# Patient Record
Sex: FEMALE | Race: WHITE | NOT HISPANIC OR LATINO | Employment: UNEMPLOYED | URBAN - NONMETROPOLITAN AREA
[De-identification: names, ages, dates, MRNs, and addresses within clinical notes are randomized per-mention and may not be internally consistent; named-entity substitution may affect disease eponyms.]

---

## 2017-02-13 ENCOUNTER — TRANSCRIBE ORDERS (OUTPATIENT)
Dept: ADMINISTRATIVE | Facility: HOSPITAL | Age: 62
End: 2017-02-13

## 2017-02-13 DIAGNOSIS — Z12.31 VISIT FOR SCREENING MAMMOGRAM: Primary | ICD-10-CM

## 2017-02-13 DIAGNOSIS — M81.0 OSTEOPOROSIS, UNSPECIFIED: ICD-10-CM

## 2020-07-24 ENCOUNTER — APPOINTMENT (OUTPATIENT)
Dept: CT IMAGING | Facility: HOSPITAL | Age: 65
End: 2020-07-24

## 2020-07-24 ENCOUNTER — HOSPITAL ENCOUNTER (EMERGENCY)
Facility: HOSPITAL | Age: 65
Discharge: HOME | End: 2020-07-24
Attending: EMERGENCY MEDICINE | Admitting: EMERGENCY MEDICINE

## 2020-07-24 VITALS
DIASTOLIC BLOOD PRESSURE: 71 MMHG | SYSTOLIC BLOOD PRESSURE: 127 MMHG | OXYGEN SATURATION: 99 % | RESPIRATION RATE: 16 BRPM | BODY MASS INDEX: 30.22 KG/M2 | WEIGHT: 177 LBS | HEIGHT: 64 IN | TEMPERATURE: 98.9 F | HEART RATE: 85 BPM

## 2020-07-24 DIAGNOSIS — R07.89 NON-CARDIAC CHEST PAIN: Primary | ICD-10-CM

## 2020-07-24 LAB
ALBUMIN SERPL-MCNC: 4.52 G/DL (ref 3.5–5.2)
ALBUMIN/GLOB SERPL: 1.3 G/DL
ALP SERPL-CCNC: 100 U/L (ref 39–117)
ALT SERPL W P-5'-P-CCNC: 22 U/L (ref 1–33)
ANION GAP SERPL CALCULATED.3IONS-SCNC: 12.3 MMOL/L (ref 5–15)
AST SERPL-CCNC: 14 U/L (ref 1–32)
BASOPHILS # BLD AUTO: 0.04 10*3/MM3 (ref 0–0.2)
BASOPHILS NFR BLD AUTO: 0.4 % (ref 0–1.5)
BILIRUB SERPL-MCNC: 0.5 MG/DL (ref 0–1.2)
BUN SERPL-MCNC: 8 MG/DL (ref 8–23)
BUN/CREAT SERPL: 10.3 (ref 7–25)
CALCIUM SPEC-SCNC: 9.9 MG/DL (ref 8.6–10.5)
CHLORIDE SERPL-SCNC: 101 MMOL/L (ref 98–107)
CO2 SERPL-SCNC: 27.7 MMOL/L (ref 22–29)
CREAT SERPL-MCNC: 0.78 MG/DL (ref 0.57–1)
DEPRECATED RDW RBC AUTO: 42.5 FL (ref 37–54)
EOSINOPHIL # BLD AUTO: 0.1 10*3/MM3 (ref 0–0.4)
EOSINOPHIL NFR BLD AUTO: 0.9 % (ref 0.3–6.2)
ERYTHROCYTE [DISTWIDTH] IN BLOOD BY AUTOMATED COUNT: 13.1 % (ref 12.3–15.4)
GFR SERPL CREATININE-BSD FRML MDRD: 75 ML/MIN/1.73
GLOBULIN UR ELPH-MCNC: 3.4 GM/DL
GLUCOSE SERPL-MCNC: 120 MG/DL (ref 65–99)
HCT VFR BLD AUTO: 46 % (ref 34–46.6)
HGB BLD-MCNC: 15.1 G/DL (ref 12–15.9)
HOLD SPECIMEN: NORMAL
HOLD SPECIMEN: NORMAL
IMM GRANULOCYTES # BLD AUTO: 0.03 10*3/MM3 (ref 0–0.05)
IMM GRANULOCYTES NFR BLD AUTO: 0.3 % (ref 0–0.5)
LYMPHOCYTES # BLD AUTO: 2.51 10*3/MM3 (ref 0.7–3.1)
LYMPHOCYTES NFR BLD AUTO: 22.3 % (ref 19.6–45.3)
MCH RBC QN AUTO: 29.8 PG (ref 26.6–33)
MCHC RBC AUTO-ENTMCNC: 32.8 G/DL (ref 31.5–35.7)
MCV RBC AUTO: 90.7 FL (ref 79–97)
MONOCYTES # BLD AUTO: 0.81 10*3/MM3 (ref 0.1–0.9)
MONOCYTES NFR BLD AUTO: 7.2 % (ref 5–12)
NEUTROPHILS NFR BLD AUTO: 68.9 % (ref 42.7–76)
NEUTROPHILS NFR BLD AUTO: 7.76 10*3/MM3 (ref 1.7–7)
NRBC BLD AUTO-RTO: 0 /100 WBC (ref 0–0.2)
PLATELET # BLD AUTO: 311 10*3/MM3 (ref 140–450)
PMV BLD AUTO: 10 FL (ref 6–12)
POTASSIUM SERPL-SCNC: 3.7 MMOL/L (ref 3.5–5.2)
PROT SERPL-MCNC: 7.9 G/DL (ref 6–8.5)
RBC # BLD AUTO: 5.07 10*6/MM3 (ref 3.77–5.28)
SODIUM SERPL-SCNC: 141 MMOL/L (ref 136–145)
TROPONIN T SERPL-MCNC: <0.01 NG/ML (ref 0–0.03)
WBC # BLD AUTO: 11.25 10*3/MM3 (ref 3.4–10.8)
WHOLE BLOOD HOLD SPECIMEN: NORMAL
WHOLE BLOOD HOLD SPECIMEN: NORMAL

## 2020-07-24 PROCEDURE — 25010000002 GLUCAGON (HUMAN RECOMBINANT) 1 MG RECONSTITUTED SOLUTION: Performed by: EMERGENCY MEDICINE

## 2020-07-24 PROCEDURE — 96374 THER/PROPH/DIAG INJ IV PUSH: CPT

## 2020-07-24 PROCEDURE — 84484 ASSAY OF TROPONIN QUANT: CPT | Performed by: PHYSICIAN ASSISTANT

## 2020-07-24 PROCEDURE — 71250 CT THORAX DX C-: CPT | Performed by: RADIOLOGY

## 2020-07-24 PROCEDURE — 93005 ELECTROCARDIOGRAM TRACING: CPT | Performed by: FAMILY MEDICINE

## 2020-07-24 PROCEDURE — 71250 CT THORAX DX C-: CPT

## 2020-07-24 PROCEDURE — 85025 COMPLETE CBC W/AUTO DIFF WBC: CPT | Performed by: PHYSICIAN ASSISTANT

## 2020-07-24 PROCEDURE — 93010 ELECTROCARDIOGRAM REPORT: CPT | Performed by: INTERNAL MEDICINE

## 2020-07-24 PROCEDURE — 93005 ELECTROCARDIOGRAM TRACING: CPT | Performed by: EMERGENCY MEDICINE

## 2020-07-24 PROCEDURE — 99284 EMERGENCY DEPT VISIT MOD MDM: CPT

## 2020-07-24 PROCEDURE — 80053 COMPREHEN METABOLIC PANEL: CPT | Performed by: PHYSICIAN ASSISTANT

## 2020-07-24 RX ORDER — SODIUM CHLORIDE 0.9 % (FLUSH) 0.9 %
10 SYRINGE (ML) INJECTION AS NEEDED
Status: DISCONTINUED | OUTPATIENT
Start: 2020-07-24 | End: 2020-07-24 | Stop reason: HOSPADM

## 2020-07-24 RX ORDER — ASPIRIN 81 MG/1
324 TABLET, CHEWABLE ORAL ONCE
Status: COMPLETED | OUTPATIENT
Start: 2020-07-24 | End: 2020-07-24

## 2020-07-24 RX ORDER — WATER 1000 ML/1000ML
INJECTION, SOLUTION INTRAVENOUS
Status: COMPLETED
Start: 2020-07-24 | End: 2020-07-24

## 2020-07-24 RX ORDER — MAGNESIUM HYDROXIDE/ALUMINUM HYDROXICE/SIMETHICONE 120; 1200; 1200 MG/30ML; MG/30ML; MG/30ML
10 SUSPENSION ORAL ONCE
Status: DISCONTINUED | OUTPATIENT
Start: 2020-07-24 | End: 2020-07-24 | Stop reason: HOSPADM

## 2020-07-24 RX ORDER — MORPHINE SULFATE 2 MG/ML
2 INJECTION, SOLUTION INTRAMUSCULAR; INTRAVENOUS ONCE
Status: DISCONTINUED | OUTPATIENT
Start: 2020-07-24 | End: 2020-07-24 | Stop reason: HOSPADM

## 2020-07-24 RX ADMIN — GLUCAGON HYDROCHLORIDE 1 MG: 1 INJECTION, POWDER, FOR SOLUTION INTRAMUSCULAR; INTRAVENOUS; SUBCUTANEOUS at 17:21

## 2020-07-24 RX ADMIN — ASPIRIN 324 MG: 81 TABLET, CHEWABLE ORAL at 16:27

## 2020-07-24 RX ADMIN — WATER 10 ML: 1 INJECTION INTRAMUSCULAR; INTRAVENOUS; SUBCUTANEOUS at 17:21

## 2020-08-01 NOTE — ED PROVIDER NOTES
Subjective   History of Present Illness    Review of Systems    Past Medical History:   Diagnosis Date   • Arm fracture    • Hypertension        No Known Allergies    Past Surgical History:   Procedure Laterality Date   • CARPAL TUNNEL RELEASE Bilateral    • CHOLECYSTECTOMY     • HYSTERECTOMY         Family History   Problem Relation Age of Onset   • Hypertension Mother    • Heart disease Father    • Hypertension Father    • Hypertension Sister    • Heart disease Brother    • Hypertension Brother    • Diabetes Brother        Social History     Socioeconomic History   • Marital status:      Spouse name: Not on file   • Number of children: Not on file   • Years of education: Not on file   • Highest education level: Not on file   Tobacco Use   • Smoking status: Former Smoker     Types: Electronic Cigarette   Substance and Sexual Activity   • Alcohol use: No   • Drug use: No           Objective   Physical Exam    Procedures           ED Course                                           MDM    Final diagnoses:   Non-cardiac chest pain

## 2020-08-05 NOTE — ED PROVIDER NOTES
Subjective   Patient presents to ER with chest pain      Chest Pain   Pain location:  Unable to specify  Pain quality: sharp    Pain radiates to:  Does not radiate  Pain severity:  Mild  Timing:  Constant  Chronicity:  New  Context: breathing    Relieved by:  Nothing  Worsened by:  Nothing      Review of Systems   Constitutional: Negative.    HENT: Negative.    Eyes: Negative.    Respiratory: Negative.    Cardiovascular: Positive for chest pain.   Gastrointestinal: Negative.    Endocrine: Negative.    Genitourinary: Negative.    Musculoskeletal: Negative.    Skin: Negative.    Allergic/Immunologic: Negative.    Neurological: Negative.    Hematological: Negative.    All other systems reviewed and are negative.      Past Medical History:   Diagnosis Date   • Arm fracture    • Hypertension        No Known Allergies    Past Surgical History:   Procedure Laterality Date   • CARPAL TUNNEL RELEASE Bilateral    • CHOLECYSTECTOMY     • HYSTERECTOMY         Family History   Problem Relation Age of Onset   • Hypertension Mother    • Heart disease Father    • Hypertension Father    • Hypertension Sister    • Heart disease Brother    • Hypertension Brother    • Diabetes Brother        Social History     Socioeconomic History   • Marital status:      Spouse name: Not on file   • Number of children: Not on file   • Years of education: Not on file   • Highest education level: Not on file   Tobacco Use   • Smoking status: Former Smoker     Types: Electronic Cigarette   Substance and Sexual Activity   • Alcohol use: No   • Drug use: No           Objective   Physical Exam   Constitutional: She appears well-developed.   HENT:   Head: Normocephalic and atraumatic.   Eyes: Pupils are equal, round, and reactive to light.   Neck: Normal range of motion.   Cardiovascular: Regular rhythm and normal pulses.   Pulmonary/Chest: Effort normal. She has no decreased breath sounds.   Abdominal: Soft.   Musculoskeletal: Normal range of  motion.   Neurological: She is alert.   Skin: Skin is warm.   Nursing note and vitals reviewed.      Procedures           ED Course                                           MDM    Final diagnoses:   Non-cardiac chest pain            Manfred Moreno MD  08/05/20 1457       Manfred Moreno MD  08/05/20 1458       Manfred Moreno MD  08/24/20 1544       Manfred Moreno MD  08/24/20 4713

## 2020-09-02 ENCOUNTER — TRANSCRIBE ORDERS (OUTPATIENT)
Dept: ADMINISTRATIVE | Facility: HOSPITAL | Age: 65
End: 2020-09-02

## 2020-09-02 DIAGNOSIS — Z01.818 OTHER SPECIFIED PRE-OPERATIVE EXAMINATION: Primary | ICD-10-CM

## 2020-09-07 ENCOUNTER — LAB (OUTPATIENT)
Dept: LAB | Facility: HOSPITAL | Age: 65
End: 2020-09-07

## 2020-09-07 DIAGNOSIS — Z01.818 OTHER SPECIFIED PRE-OPERATIVE EXAMINATION: ICD-10-CM

## 2020-09-07 LAB — SARS-COV-2 RNA NOSE QL NAA+PROBE: NOT DETECTED

## 2020-09-07 PROCEDURE — U0004 COV-19 TEST NON-CDC HGH THRU: HCPCS

## 2020-09-07 PROCEDURE — C9803 HOPD COVID-19 SPEC COLLECT: HCPCS

## 2020-09-10 ENCOUNTER — TRANSCRIBE ORDERS (OUTPATIENT)
Dept: LAB | Facility: HOSPITAL | Age: 65
End: 2020-09-10

## 2020-09-10 DIAGNOSIS — Z01.818 PRE-OPERATIVE CLEARANCE: Primary | ICD-10-CM

## 2020-09-11 ENCOUNTER — LAB (OUTPATIENT)
Dept: LAB | Facility: HOSPITAL | Age: 65
End: 2020-09-11

## 2020-09-11 DIAGNOSIS — Z01.818 PRE-OPERATIVE CLEARANCE: ICD-10-CM

## 2020-09-11 PROCEDURE — C9803 HOPD COVID-19 SPEC COLLECT: HCPCS

## 2020-09-11 PROCEDURE — U0004 COV-19 TEST NON-CDC HGH THRU: HCPCS

## 2020-09-12 LAB — SARS-COV-2 RNA NOSE QL NAA+PROBE: NOT DETECTED

## 2020-09-17 ENCOUNTER — TRANSCRIBE ORDERS (OUTPATIENT)
Dept: ADMINISTRATIVE | Facility: HOSPITAL | Age: 65
End: 2020-09-17

## 2020-09-17 DIAGNOSIS — Z01.818 OTHER SPECIFIED PRE-OPERATIVE EXAMINATION: Primary | ICD-10-CM

## 2020-09-21 ENCOUNTER — LAB (OUTPATIENT)
Dept: LAB | Facility: HOSPITAL | Age: 65
End: 2020-09-21

## 2020-09-21 LAB — SARS-COV-2 RNA NOSE QL NAA+PROBE: NOT DETECTED

## 2020-09-21 PROCEDURE — U0004 COV-19 TEST NON-CDC HGH THRU: HCPCS | Performed by: OPHTHALMOLOGY

## 2020-11-25 ENCOUNTER — HOSPITAL ENCOUNTER (OUTPATIENT)
Dept: BONE DENSITY | Facility: HOSPITAL | Age: 65
Discharge: HOME | End: 2020-11-25

## 2020-11-25 ENCOUNTER — HOSPITAL ENCOUNTER (OUTPATIENT)
Dept: MAMMOGRAPHY | Facility: HOSPITAL | Age: 65
Discharge: HOME | End: 2020-11-25

## 2020-11-25 DIAGNOSIS — Z12.31 VISIT FOR SCREENING MAMMOGRAM: ICD-10-CM

## 2020-11-25 DIAGNOSIS — M81.0 OSTEOPOROSIS, POST-MENOPAUSAL: ICD-10-CM

## 2020-11-25 PROCEDURE — 77063 BREAST TOMOSYNTHESIS BI: CPT | Performed by: RADIOLOGY

## 2020-11-25 PROCEDURE — 77080 DXA BONE DENSITY AXIAL: CPT | Performed by: RADIOLOGY

## 2020-11-25 PROCEDURE — 77067 SCR MAMMO BI INCL CAD: CPT | Performed by: RADIOLOGY

## 2020-11-25 PROCEDURE — 77063 BREAST TOMOSYNTHESIS BI: CPT

## 2020-11-25 PROCEDURE — 77067 SCR MAMMO BI INCL CAD: CPT

## 2020-11-25 PROCEDURE — 77080 DXA BONE DENSITY AXIAL: CPT

## 2020-12-02 ENCOUNTER — TRANSCRIBE ORDERS (OUTPATIENT)
Dept: ADMINISTRATIVE | Facility: HOSPITAL | Age: 65
End: 2020-12-02

## 2020-12-02 DIAGNOSIS — Z01.818 PRE-OPERATIVE CLEARANCE: Primary | ICD-10-CM

## 2020-12-04 ENCOUNTER — LAB (OUTPATIENT)
Dept: LAB | Facility: HOSPITAL | Age: 65
End: 2020-12-04

## 2020-12-04 DIAGNOSIS — Z01.818 PRE-OPERATIVE CLEARANCE: ICD-10-CM

## 2020-12-04 LAB — SARS-COV-2 RNA RESP QL NAA+PROBE: NOT DETECTED

## 2020-12-04 PROCEDURE — U0004 COV-19 TEST NON-CDC HGH THRU: HCPCS | Performed by: INTERNAL MEDICINE

## 2020-12-04 PROCEDURE — C9803 HOPD COVID-19 SPEC COLLECT: HCPCS

## 2021-02-05 ENCOUNTER — APPOINTMENT (OUTPATIENT)
Dept: BONE DENSITY | Facility: HOSPITAL | Age: 66
End: 2021-02-05

## 2021-02-05 ENCOUNTER — APPOINTMENT (OUTPATIENT)
Dept: MAMMOGRAPHY | Facility: HOSPITAL | Age: 66
End: 2021-02-05

## 2021-02-19 ENCOUNTER — IMMUNIZATION (OUTPATIENT)
Dept: VACCINE CLINIC | Facility: HOSPITAL | Age: 66
End: 2021-02-19

## 2021-02-19 PROCEDURE — 91300 HC SARSCOV02 VAC 30MCG/0.3ML IM: CPT | Performed by: INTERNAL MEDICINE

## 2021-02-19 PROCEDURE — 0001A: CPT | Performed by: INTERNAL MEDICINE

## 2021-03-10 ENCOUNTER — OFFICE VISIT (OUTPATIENT)
Dept: CARDIOLOGY | Facility: CLINIC | Age: 66
End: 2021-03-10

## 2021-03-10 VITALS
SYSTOLIC BLOOD PRESSURE: 144 MMHG | DIASTOLIC BLOOD PRESSURE: 85 MMHG | HEIGHT: 64 IN | TEMPERATURE: 98 F | RESPIRATION RATE: 16 BRPM | HEART RATE: 97 BPM | WEIGHT: 177.6 LBS | BODY MASS INDEX: 30.32 KG/M2

## 2021-03-10 DIAGNOSIS — R07.2 PRECORDIAL PAIN: Primary | ICD-10-CM

## 2021-03-10 DIAGNOSIS — I10 ESSENTIAL HYPERTENSION: ICD-10-CM

## 2021-03-10 DIAGNOSIS — Z82.49 FAMILY HISTORY OF PREMATURE CORONARY ARTERY DISEASE: ICD-10-CM

## 2021-03-10 PROCEDURE — 93000 ELECTROCARDIOGRAM COMPLETE: CPT | Performed by: INTERNAL MEDICINE

## 2021-03-10 PROCEDURE — 99204 OFFICE O/P NEW MOD 45 MIN: CPT | Performed by: INTERNAL MEDICINE

## 2021-03-10 RX ORDER — PANTOPRAZOLE SODIUM 40 MG/1
40 TABLET, DELAYED RELEASE ORAL DAILY
COMMUNITY

## 2021-03-10 RX ORDER — NITROGLYCERIN 0.4 MG/1
TABLET SUBLINGUAL
Qty: 25 TABLET | Refills: 2 | Status: SHIPPED | OUTPATIENT
Start: 2021-03-10

## 2021-03-10 RX ORDER — ATORVASTATIN CALCIUM 20 MG/1
20 TABLET, FILM COATED ORAL DAILY
Status: ON HOLD | COMMUNITY
End: 2022-10-19 | Stop reason: SDUPTHER

## 2021-03-10 RX ORDER — ALENDRONATE SODIUM 70 MG/1
70 TABLET ORAL
COMMUNITY

## 2021-03-10 NOTE — PROGRESS NOTES
Lawrence Faulkner, DO Ivis Vela  1955  03/10/2021    Patient Active Problem List   Diagnosis   • Hypertension   • Asthma   • Osteoporosis   • Left arm pain   • Closed nondisplaced fracture of head of radius with routine healing       Dear Lawrence:    Subjective     Ivis Vela is a 65 y.o. female with the problems as listed above, presents    Chief complaint: Recurrent chest pains.    History of Present Illness: Ivis is a pleasant 65-year-old [place] female with no history of known significant coronary artery disease, has history of hypertension, dyslipidemia, previous history of smoking up to 2 packs a day for about 30 years but quit 6 years ago, presents with complaints of having recurrent chest pains on and off for the last couple of months.  She describes these episodes as being felt as pressure and tightness in the substernal region with radiation into the left side of her chest and into the left side of the neck into the back and to the left arm associated with some shortness of breath and sweating.  These would occur at random and mostly at rest not as much with exertion.  These are rated as 8 of 10 on the pain scale.  The usually resolve spontaneously.  She has some dyspnea with moderate exertion at times with no PND, orthopnea. She has some palpitations when she lays on the on the left side.  She feels like it is running away with nausea but no dizziness or syncope.  She is nondiabetic.  She says she has a positive family history of premature coronary artery disease with her dad having had coronary heart disease in his 40s and  in his early 60s.  Her cardiac catheterization in 2014 revealed mild nonobstructive disease.    Ivis Vela  Cardiac Catheterization/Vascular Study  Order# [id number]  Reading physician: Interface, See Report Ordering physician: Interface, See Report Study date: 2014       Patient Information    Patient Name   Ivis Vela MRN   [id number] Legal Sex   Female  (Age)    1955 (65 y.o.)   Conclusion    [facility]  [address], [place] [address]  [phone]     NAME:                    ABDULLAHI MAHAN  ROOM NUMBER:             [id number]  MEDICAL RECORD NUMBER:   [id number]  VISIT NUMBER:            [id number]  PHYSICIAN:                    Cristofer Arceo MD, [facility]  DATE OF PROCEDURE:       03/04/2014     YOB: 1955     III.  RESULTS:  a) HEMODYNAMICS:    Preangiographic pressure:  Aortic pressure 112/65 (mean 86) mmHg.  Left  ventricular pressure 96/4 mmHg.   Postangiographic pressure:  Left ventricular pressure 96/4 mmHg.  Aortic  pressure 112/65 (mean 86) mmHg.     b) ANGIOGRAMS:  On fluoroscopy, there was no significant epicardial calcification noted.       LEFT MAIN CORONARY ARTERY:  It is short and bifurcates into a medium size left  anterior descending and left circumflex coronary artery.     LEFT ANTERIOR DESCENDING CORONARY ARTERY:  Gives rise to a small diagonal  branch over the proximal segment followed by a medium size diagonal branch from  the proximal segment.  There is overall nonobstructing plaquing noted, mild  nonobstructing plaquing noted proximal and mid-LAD.   At the distal segment of  the LAD, there is about 40-50% narrowing.       LEFT CIRCUMFLEX CORONARY ARTERY:  Gives rise to a medium size obtuse marginal  branch over the mid-segment and a medium small posterolateral branch distally.   There is about 50% diffuse narrowing noted in the distal segment of the  smaller posterolateral branch.  Otherwise, the rest of the circumflex has  minimal plaquing disease.      RIGHT CORONARY ARTERY:  Is a medium caliber vessel and is dominant for  posterior circulation.  It has also minimal plaquing disease including a small  size posterior descending artery and posterolateral branch.     LEFT VENTRICULAR ANGIOGRAM:  Was done in a 30 degree MYERS projection.  It  reveals normal left ventricular chamber size, wall motion and systolic  function  with an estimated left ventricular ejection fraction of around 60%.     IV.  CONCLUSION/COMMENTS:   The patient is a 58-year-old  female with  recent chest pains suspicious for unstable angina.  She has multiple risk  factors for coronary artery disease.  Her cardiac catheterization reveals:     1.  Normal left main coronary artery.  2.  Mild plaquing disease in the left anterior descending coronary artery.  3.  Left circumflex coronary artery has overall mild plaquing disease except in  the distal segment of a small posterolateral branch which has about 50% diffuse  narrowing.  4.  Right coronary artery is a relatively small size vessel, although it is  dominant for posterior circulation.  There is also minimal plaquing disease.  5.  Normal left ventricular chamber size, wall motion, and systolic function  with an estimated left ventricular ejection fraction of about 60%.     RECOMMENDATIONS:  In view of nonsignificant degree of atherosclerotic disease,  would continue to emphasize on risk factor modification as needed now and  perhaps look at a noncardiac cause for her symptoms should they persist.         D:   CELIA 03/05/2014 17:41:03  T:   paola 03/05/2014 19:13:45  JOB ID:[id number]  [id number]  Revision Count:     0  cc:  Lawrence Faulkner D.O.                              Signature:__________________________                                     Cristofer Arceo MD, [facility]       Cardiac risk factors:hypertension, smoking, Positive family Hx. of premature athersclerotivc disease., Obesity and Age and postmenopausal status.    No Known Allergies:      Current Outpatient Medications:   •  alendronate (Fosamax) 70 MG tablet, Take 70 mg by mouth Every 7 (Seven) Days., Disp: , Rfl:   •  amitriptyline (ELAVIL) 25 MG tablet, , Disp: , Rfl:   •  aspirin 325 MG tablet, Take 81 mg by mouth Daily., Disp: , Rfl:   •  atorvastatin (LIPITOR) 20 MG tablet, Take 20 mg by mouth Daily., Disp: , Rfl:   •   losartan-hydrochlorothiazide (HYZAAR) 50-12.5 MG per tablet, , Disp: , Rfl:   •  pantoprazole (PROTONIX) 40 MG EC tablet, Take 40 mg by mouth Daily., Disp: , Rfl:   •  etodolac (LODINE) 400 MG tablet, , Disp: , Rfl:   •  HYDROcodone-acetaminophen (NORCO) 5-325 MG per tablet, Take 1 tablet by mouth Every 6 (Six) Hours As Needed for moderate pain (4-6)., Disp: 10 tablet, Rfl: 0  •  metoprolol tartrate (LOPRESSOR) 25 MG tablet, Take 1 tablet by mouth 2 (Two) Times a Day., Disp: 60 tablet, Rfl: 3  •  nitroglycerin (NITROSTAT) 0.4 MG SL tablet, 1 under the tongue as needed for angina, may repeat q5mins for up three doses, Disp: 25 tablet, Rfl: 2    Past Medical History:   Diagnosis Date   • Arm fracture    • Hyperlipidemia    • Hypertension    • Osteoporosis      Past Surgical History:   Procedure Laterality Date   • CARPAL TUNNEL RELEASE Bilateral    • CHOLECYSTECTOMY     • ESOPHAGOSCOPY W/ DILATION     • HYSTERECTOMY       Family History   Problem Relation Age of Onset   • Hypertension Mother    • Heart disease Father    • Hypertension Father    • Hypertension Sister    • Heart disease Brother    • Hypertension Brother    • Diabetes Brother    • Breast cancer Neg Hx      Social History     Tobacco Use   • Smoking status: Former Smoker     Types: Electronic Cigarette, Cigarettes     Quit date:      Years since quittin.1   • Smokeless tobacco: Never Used   Substance Use Topics   • Alcohol use: No   • Drug use: No     Review of Systems   Constitutional: Negative for chills, diaphoresis and fever.   Eyes: Positive for visual disturbance.   Cardiovascular: Positive for chest pain. Negative for leg swelling, orthopnea, palpitations and paroxysmal nocturnal dyspnea.   Respiratory: Negative for cough, hemoptysis and shortness of breath.    Endocrine: Negative for cold intolerance and heat intolerance.   Hematologic/Lymphatic: Does not bruise/bleed easily.   Skin: Negative for rash.   Musculoskeletal: Positive for back  "pain. Negative for myalgias.   Gastrointestinal: Negative for abdominal pain, constipation, diarrhea, nausea and vomiting.   Genitourinary: Negative for dysuria and hematuria.   Neurological: Positive for dizziness, headaches and light-headedness. Negative for focal weakness and numbness.   All other systems reviewed and are negative.    Objective   Blood pressure 144/85, pulse 97, temperature 98 °F (36.7 °C), resp. rate 16, height 162.6 cm (64\"), weight 80.6 kg (177 lb 9.6 oz).  Body mass index is 30.48 kg/m².    Vitals reviewed.   Constitutional:       Appearance: Well-developed.   Eyes:      Pupils: Pupils are equal, round, and reactive to light.   Neck:      Thyroid: No thyromegaly.      Vascular: No JVD.      Trachea: No tracheal deviation.      Lymphadenopathy: No cervical adenopathy.   Pulmonary:      Effort: Pulmonary effort is normal.      Breath sounds: Normal breath sounds.   Cardiovascular:      PMI at left midclavicular line. Normal rate. Regular rhythm. Normal S1. Normal S2.      Murmurs: There is no murmur.      No gallop. No click. No rub.   Pulses:     Intact distal pulses.   Edema:     Peripheral edema absent.   Abdominal:      General: Bowel sounds are normal.      Palpations: Abdomen is soft. There is no abdominal mass.      Tenderness: There is no abdominal tenderness.   Musculoskeletal: Normal range of motion.      Cervical back: Neck supple. Skin:     General: Skin is warm and dry.      Findings: No rash.   Neurological:      Mental Status: Alert and oriented to person, place, and time.         Lab Results   Component Value Date     07/24/2020    K 3.7 07/24/2020     07/24/2020    CO2 27.7 07/24/2020    BUN 8 07/24/2020    CREATININE 0.78 07/24/2020    GLUCOSE 120 (H) 07/24/2020    CALCIUM 9.9 07/24/2020    AST 14 07/24/2020    ALT 22 07/24/2020    ALKPHOS 100 07/24/2020     No results found for: CKTOTAL  Lab Results   Component Value Date    WBC 11.25 (H) 07/24/2020    HGB 15.1 " 07/24/2020    HCT 46.0 07/24/2020     07/24/2020       ECG 12 Lead    Date/Time: 3/10/2021 2:47 PM  Performed by: Cristofer Arceo MD  Authorized by: Cristofer Arceo MD   Comparison: compared with previous ECG from 7/24/2020  Comparison to previous ECG: Previous EKG revealed ST-T wave changes of inferolateral myocardial ischemia which are not evident on this EKG.  Rhythm: sinus rhythm  BPM: 97  Conduction: conduction normal  Other findings: non-specific ST-T wave changes                Assessment/Plan :   Diagnosis Plan   1. Precordial pain with some typical and some atypical features for CCS class II angina. Stress Test With Myocardial Perfusion (1 Day)   2. Essential hypertension     3. Family history of premature coronary artery disease       Recommendations:  Orders Placed This Encounter   Procedures   • Stress Test With Myocardial Perfusion (1 Day)   • ECG 12 Lead        1. Continue with aspirin.  2. Add metoprolol 25 mg p.o. twice daily.  3. Use sublingual nitroglycerin as needed for chest pain.  4. Evaluate further with a Lexiscan sestamibi study (due to poor functional capacity)    Return in about 3 weeks (around 3/31/2021) for or sooner if needed.    As always, Lawrence  I appreciate very much the opportunity to participate in the cardiovascular care of your patients. Please do not hesitate to call me with any questions with regards to Ivis Vela's evaluation and management.       With Best Regards,        Cristofer Arceo MD, [facility]    Please note that portions of this note were completed with a voice recognition program.

## 2021-03-12 ENCOUNTER — IMMUNIZATION (OUTPATIENT)
Dept: VACCINE CLINIC | Facility: HOSPITAL | Age: 66
End: 2021-03-12

## 2021-03-12 PROCEDURE — 0002A: CPT | Performed by: INTERNAL MEDICINE

## 2021-03-12 PROCEDURE — 91300 HC SARSCOV02 VAC 30MCG/0.3ML IM: CPT | Performed by: INTERNAL MEDICINE

## 2021-03-30 ENCOUNTER — HOSPITAL ENCOUNTER (OUTPATIENT)
Dept: NUCLEAR MEDICINE | Facility: HOSPITAL | Age: 66
Discharge: HOME | End: 2021-03-30

## 2021-03-30 ENCOUNTER — HOSPITAL ENCOUNTER (OUTPATIENT)
Dept: CARDIOLOGY | Facility: HOSPITAL | Age: 66
Discharge: HOME | End: 2021-03-30

## 2021-03-30 DIAGNOSIS — R07.2 PRECORDIAL PAIN: ICD-10-CM

## 2021-03-30 LAB
BH CV NUCLEAR PRIOR STUDY: 3
BH CV REST NUCLEAR ISOTOPE DOSE: 9.5 MCI
BH CV STRESS BP STAGE 1: NORMAL
BH CV STRESS BP STAGE 2: NORMAL
BH CV STRESS COMMENTS STAGE 1: NORMAL
BH CV STRESS COMMENTS STAGE 2: NORMAL
BH CV STRESS DOSE REGADENOSON STAGE 1: 0.4
BH CV STRESS DURATION MIN STAGE 1: 0
BH CV STRESS DURATION MIN STAGE 2: 4
BH CV STRESS DURATION SEC STAGE 1: 10
BH CV STRESS DURATION SEC STAGE 2: 0
BH CV STRESS HR STAGE 1: 79
BH CV STRESS HR STAGE 2: 81
BH CV STRESS NUCLEAR ISOTOPE DOSE: 29.8 MCI
BH CV STRESS PROTOCOL 1: NORMAL
BH CV STRESS RECOVERY BP: NORMAL MMHG
BH CV STRESS RECOVERY HR: 56 BPM
BH CV STRESS STAGE 1: 1
BH CV STRESS STAGE 2: 2
MAXIMAL PREDICTED HEART RATE: 156 BPM
PERCENT MAX PREDICTED HR: 51.92 %
STRESS BASELINE BP: NORMAL MMHG
STRESS BASELINE HR: 53 BPM
STRESS PERCENT HR: 61 %
STRESS POST PEAK BP: NORMAL MMHG
STRESS POST PEAK HR: 81 BPM
STRESS TARGET HR: 133 BPM

## 2021-03-30 PROCEDURE — 78452 HT MUSCLE IMAGE SPECT MULT: CPT

## 2021-03-30 PROCEDURE — 93017 CV STRESS TEST TRACING ONLY: CPT

## 2021-03-30 PROCEDURE — 0 TECHNETIUM SESTAMIBI: Performed by: INTERNAL MEDICINE

## 2021-03-30 PROCEDURE — A9500 TC99M SESTAMIBI: HCPCS | Performed by: INTERNAL MEDICINE

## 2021-03-30 PROCEDURE — 93018 CV STRESS TEST I&R ONLY: CPT | Performed by: INTERNAL MEDICINE

## 2021-03-30 PROCEDURE — 25010000002 REGADENOSON 0.4 MG/5ML SOLUTION: Performed by: INTERNAL MEDICINE

## 2021-03-30 PROCEDURE — 78452 HT MUSCLE IMAGE SPECT MULT: CPT | Performed by: INTERNAL MEDICINE

## 2021-03-30 RX ADMIN — TECHNETIUM TC 99M SESTAMIBI 1 DOSE: 1 INJECTION INTRAVENOUS at 08:50

## 2021-03-30 RX ADMIN — TECHNETIUM TC 99M SESTAMIBI 1 DOSE: 1 INJECTION INTRAVENOUS at 10:17

## 2021-03-30 RX ADMIN — REGADENOSON 0.4 MG: 0.08 INJECTION, SOLUTION INTRAVENOUS at 10:17

## 2021-04-19 ENCOUNTER — OFFICE VISIT (OUTPATIENT)
Dept: CARDIOLOGY | Facility: CLINIC | Age: 66
End: 2021-04-19

## 2021-04-19 VITALS
BODY MASS INDEX: 31.51 KG/M2 | SYSTOLIC BLOOD PRESSURE: 152 MMHG | HEART RATE: 72 BPM | WEIGHT: 184.6 LBS | HEIGHT: 64 IN | DIASTOLIC BLOOD PRESSURE: 76 MMHG | OXYGEN SATURATION: 98 % | TEMPERATURE: 98.7 F

## 2021-04-19 DIAGNOSIS — Z82.49 FAMILY HISTORY OF PREMATURE CORONARY ARTERY DISEASE: ICD-10-CM

## 2021-04-19 DIAGNOSIS — R07.2 PRECORDIAL PAIN: Primary | ICD-10-CM

## 2021-04-19 DIAGNOSIS — I10 ESSENTIAL HYPERTENSION: ICD-10-CM

## 2021-04-19 PROCEDURE — 99214 OFFICE O/P EST MOD 30 MIN: CPT | Performed by: NURSE PRACTITIONER

## 2021-04-19 RX ORDER — ISOSORBIDE MONONITRATE 30 MG/1
30 TABLET, EXTENDED RELEASE ORAL DAILY
Qty: 30 TABLET | Refills: 5 | Status: SHIPPED | OUTPATIENT
Start: 2021-04-19 | End: 2022-07-20 | Stop reason: SDUPTHER

## 2021-04-19 NOTE — PROGRESS NOTES
Lawrence Faulkner DO  Ivis Vela  1955 04/19/2021    Patient Active Problem List   Diagnosis   • Hypertension   • Asthma   • Osteoporosis   • Left arm pain   • Closed nondisplaced fracture of head of radius with routine healing       Dear Lawrence Faulkner DO:    Subjective     Chief Complaint   Patient presents with   • Med Management     med list.    • Results     stress   • Chest Pain   • Shortness of Breath   • Edema   • Dizziness   • Palpitations           History of Present Illness:    Ivis Vela is a 65 y.o. female with a past medical history of hypertension, dyslipidemia, history of tobacco abuse having quit 6 years ago, and family history of CAD.  She initially presented with complaints of chest pain which like a pressure and tightness in the substernal region radiating into the left side of her chest, left neck, and left arm.  She did have some associated shortness of breath and diaphoresis.  Her last cardiac catheterization was in 2014 and revealed mild, nonobstructive disease.  She was evaluated further with a Lexiscan stress test which was negative for evidence of ischemia.  She continues to have the chest pains about twice per week.  She cannot identify any aggravating or alleviating factors.  She has not tried nitroglycerin.  She also has noted she has excessive belching but this does not seem to be related to the chest pains.  She reports recent EGD was reportedly unremarkable.  She does note she was having some chest pains in January 2021 and went to [facility] ER.  She was told at that time that the bottom part of her heart was not pumping and started on a heparin drip.  Ultimately, she was transferred to [facility] in [place], [place] where she apparently underwent some testing and was told everything was unremarkable.  These records are not available for review.          No Known Allergies:      Current Outpatient Medications:   •  alendronate (Fosamax) 70 MG tablet, Take 70  mg by mouth Every 7 (Seven) Days., Disp: , Rfl:   •  amitriptyline (ELAVIL) 25 MG tablet, , Disp: , Rfl:   •  aspirin 325 MG tablet, Take 81 mg by mouth Daily., Disp: , Rfl:   •  atorvastatin (LIPITOR) 20 MG tablet, Take 20 mg by mouth Daily., Disp: , Rfl:   •  etodolac (LODINE) 400 MG tablet, , Disp: , Rfl:   •  HYDROcodone-acetaminophen (NORCO) 5-325 MG per tablet, Take 1 tablet by mouth Every 6 (Six) Hours As Needed for moderate pain (4-6)., Disp: 10 tablet, Rfl: 0  •  losartan-hydrochlorothiazide (HYZAAR) 50-12.5 MG per tablet, , Disp: , Rfl:   •  metoprolol tartrate (LOPRESSOR) 25 MG tablet, Take 1 tablet by mouth 2 (Two) Times a Day., Disp: 60 tablet, Rfl: 3  •  nitroglycerin (NITROSTAT) 0.4 MG SL tablet, 1 under the tongue as needed for angina, may repeat q5mins for up three doses, Disp: 25 tablet, Rfl: 2  •  pantoprazole (PROTONIX) 40 MG EC tablet, Take 40 mg by mouth Daily., Disp: , Rfl:   •  isosorbide mononitrate (IMDUR) 30 MG 24 hr tablet, Take 1 tablet by mouth Daily., Disp: 30 tablet, Rfl: 5      The following portions of the patient's history were reviewed and updated as appropriate: allergies, current medications, past family history, past medical history, past social history, past surgical history and problem list.    Social History     Tobacco Use   • Smoking status: Former Smoker     Types: Electronic Cigarette, Cigarettes     Quit date:      Years since quittin.3   • Smokeless tobacco: Never Used   Substance Use Topics   • Alcohol use: No   • Drug use: No       Review of Systems   Constitutional: Negative for decreased appetite and malaise/fatigue.   Cardiovascular: Positive for chest pain. Negative for dyspnea on exertion, irregular heartbeat, leg swelling, near-syncope, orthopnea, palpitations, paroxysmal nocturnal dyspnea and syncope.   Respiratory: Negative for cough, shortness of breath and wheezing.    Neurological: Negative for dizziness, light-headedness and weakness.  "      Objective   Vitals:    04/19/21 1331   BP: 152/76   BP Location: Left arm   Patient Position: Sitting   Cuff Size: Adult   Pulse: 72   Temp: 98.7 °F (37.1 °C)   TempSrc: Infrared   SpO2: 98%   Weight: 83.7 kg (184 lb 9.6 oz)   Height: 162.6 cm (64\")     Body mass index is 31.69 kg/m².        Vitals reviewed.   Constitutional:       Appearance: Healthy appearance. Well-developed and not in distress.   HENT:      Head: Normocephalic and atraumatic.   Neck:      Vascular: No JVD.   Pulmonary:      Effort: Pulmonary effort is normal.      Breath sounds: Normal breath sounds. No wheezing. No rales.   Cardiovascular:      Normal rate. Regular rhythm.      Murmurs: There is no murmur.      . No S3 and S4 gallop.   Edema:     Peripheral edema absent.   Abdominal:      General: Bowel sounds are normal.      Palpations: Abdomen is soft.   Skin:     General: Skin is warm and dry.   Neurological:      Mental Status: Alert, oriented to person, place, and time and oriented to person, place and time.   Psychiatric:         Mood and Affect: Mood normal.         Behavior: Behavior normal.         Lab Results   Component Value Date     07/24/2020    K 3.7 07/24/2020     07/24/2020    CO2 27.7 07/24/2020    BUN 8 07/24/2020    CREATININE 0.78 07/24/2020    GLUCOSE 120 (H) 07/24/2020    CALCIUM 9.9 07/24/2020    AST 14 07/24/2020    ALT 22 07/24/2020    ALKPHOS 100 07/24/2020     No results found for: CKTOTAL  Lab Results   Component Value Date    WBC 11.25 (H) 07/24/2020    HGB 15.1 07/24/2020    HCT 46.0 07/24/2020     07/24/2020     No results found for: INR  No results found for: MG  No results found for: TSH, PSA, CHLPL, TRIG, HDL, LDL   No results found for: BNP        Procedures      Assessment/Plan    Diagnosis Plan   1. Precordial pain  isosorbide mononitrate (IMDUR) 30 MG 24 hr tablet   2. Essential hypertension  isosorbide mononitrate (IMDUR) 30 MG 24 hr tablet   3. Family history of premature " coronary artery disease  isosorbide mononitrate (IMDUR) 30 MG 24 hr tablet                Recommendations:    1. I have reviewed the results of the stress test with the patient today.  Since she continues to have chest pain, will initiate isosorbide 30 mg daily and monitor for improvement.  2. We will request records from [facility] in [facility] in [place], [place].  3. Follow-up in 2 months or sooner if needed.        Return in about 2 months (around 6/19/2021) for Recheck.    As always, I appreciate very much the opportunity to participate in the cardiovascular care of your patients.      With Best Regards,    Baylee Chaudhary, [facility]

## 2021-04-22 ENCOUNTER — TELEPHONE (OUTPATIENT)
Dept: CARDIOLOGY | Facility: CLINIC | Age: 66
End: 2021-04-22

## 2021-08-17 ENCOUNTER — TRANSCRIBE ORDERS (OUTPATIENT)
Dept: ADMINISTRATIVE | Facility: HOSPITAL | Age: 66
End: 2021-08-17

## 2021-08-17 DIAGNOSIS — M50.30 DEGENERATION OF CERVICAL INTERVERTEBRAL DISC: Primary | ICD-10-CM

## 2021-08-17 DIAGNOSIS — G95.20 SPINAL CORD COMPRESSION (HCC): ICD-10-CM

## 2021-08-19 ENCOUNTER — HOSPITAL ENCOUNTER (OUTPATIENT)
Dept: MRI IMAGING | Facility: HOSPITAL | Age: 66
Discharge: HOME | End: 2021-08-19
Admitting: PHYSICIAN ASSISTANT

## 2021-08-19 DIAGNOSIS — M50.30 DEGENERATION OF CERVICAL INTERVERTEBRAL DISC: ICD-10-CM

## 2021-08-19 DIAGNOSIS — G95.20 SPINAL CORD COMPRESSION (HCC): ICD-10-CM

## 2021-08-19 LAB — CREAT BLDA-MCNC: 0.9 MG/DL (ref 0.6–1.3)

## 2021-08-19 PROCEDURE — 0 GADOBENATE DIMEGLUMINE 529 MG/ML SOLUTION: Performed by: PHYSICIAN ASSISTANT

## 2021-08-19 PROCEDURE — A9577 INJ MULTIHANCE: HCPCS | Performed by: PHYSICIAN ASSISTANT

## 2021-08-19 PROCEDURE — 0 GADOBENATE DIMEGLUMINE 529 MG/ML SOLUTION

## 2021-08-19 PROCEDURE — 72156 MRI NECK SPINE W/O & W/DYE: CPT

## 2021-08-19 PROCEDURE — 72156 MRI NECK SPINE W/O & W/DYE: CPT | Performed by: RADIOLOGY

## 2021-08-19 PROCEDURE — 82565 ASSAY OF CREATININE: CPT

## 2021-08-19 PROCEDURE — A9577 INJ MULTIHANCE: HCPCS

## 2021-08-19 RX ADMIN — GADOBENATE DIMEGLUMINE 16 ML: 529 INJECTION, SOLUTION INTRAVENOUS at 12:53

## 2022-01-05 ENCOUNTER — HOSPITAL ENCOUNTER (EMERGENCY)
Facility: HOSPITAL | Age: 67
End: 2022-01-05

## 2022-01-05 ENCOUNTER — HOSPITAL ENCOUNTER (OUTPATIENT)
Dept: GENERAL RADIOLOGY | Facility: HOSPITAL | Age: 67
Discharge: HOME | End: 2022-01-05
Admitting: PHYSICIAN ASSISTANT

## 2022-01-05 ENCOUNTER — TRANSCRIBE ORDERS (OUTPATIENT)
Dept: ADMINISTRATIVE | Facility: HOSPITAL | Age: 67
End: 2022-01-05

## 2022-01-05 DIAGNOSIS — M25.511 RIGHT SHOULDER PAIN, UNSPECIFIED CHRONICITY: Primary | ICD-10-CM

## 2022-01-05 DIAGNOSIS — M25.512 LEFT SHOULDER PAIN, UNSPECIFIED CHRONICITY: ICD-10-CM

## 2022-01-05 DIAGNOSIS — M25.511 RIGHT SHOULDER PAIN, UNSPECIFIED CHRONICITY: ICD-10-CM

## 2022-01-05 PROCEDURE — 73030 X-RAY EXAM OF SHOULDER: CPT

## 2022-01-05 PROCEDURE — 73030 X-RAY EXAM OF SHOULDER: CPT | Performed by: RADIOLOGY

## 2022-03-07 ENCOUNTER — TRANSCRIBE ORDERS (OUTPATIENT)
Dept: ADMINISTRATIVE | Facility: HOSPITAL | Age: 67
End: 2022-03-07

## 2022-03-07 DIAGNOSIS — F17.290 CIGAR SMOKER: Primary | ICD-10-CM

## 2022-03-11 ENCOUNTER — HOSPITAL ENCOUNTER (OUTPATIENT)
Dept: CT IMAGING | Facility: HOSPITAL | Age: 67
Discharge: HOME | End: 2022-03-11
Admitting: NURSE PRACTITIONER

## 2022-03-11 DIAGNOSIS — F17.290 CIGAR SMOKER: ICD-10-CM

## 2022-03-11 PROCEDURE — 71271 CT THORAX LUNG CANCER SCR C-: CPT

## 2022-03-11 PROCEDURE — 71271 CT THORAX LUNG CANCER SCR C-: CPT | Performed by: RADIOLOGY

## 2022-03-28 ENCOUNTER — HOSPITAL ENCOUNTER (OUTPATIENT)
Dept: MAMMOGRAPHY | Facility: HOSPITAL | Age: 67
Discharge: HOME | End: 2022-03-28
Admitting: NURSE PRACTITIONER

## 2022-03-28 DIAGNOSIS — Z12.31 VISIT FOR SCREENING MAMMOGRAM: ICD-10-CM

## 2022-03-28 PROCEDURE — 77063 BREAST TOMOSYNTHESIS BI: CPT | Performed by: RADIOLOGY

## 2022-03-28 PROCEDURE — 77067 SCR MAMMO BI INCL CAD: CPT

## 2022-03-28 PROCEDURE — 77067 SCR MAMMO BI INCL CAD: CPT | Performed by: RADIOLOGY

## 2022-03-28 PROCEDURE — 77063 BREAST TOMOSYNTHESIS BI: CPT

## 2022-07-20 ENCOUNTER — OFFICE VISIT (OUTPATIENT)
Dept: CARDIOLOGY | Facility: CLINIC | Age: 67
End: 2022-07-20

## 2022-07-20 VITALS
SYSTOLIC BLOOD PRESSURE: 127 MMHG | DIASTOLIC BLOOD PRESSURE: 76 MMHG | HEART RATE: 71 BPM | BODY MASS INDEX: 30.56 KG/M2 | WEIGHT: 179 LBS | HEIGHT: 64 IN | OXYGEN SATURATION: 99 %

## 2022-07-20 DIAGNOSIS — Z82.49 FAMILY HISTORY OF PREMATURE CORONARY ARTERY DISEASE: ICD-10-CM

## 2022-07-20 DIAGNOSIS — I10 ESSENTIAL HYPERTENSION: ICD-10-CM

## 2022-07-20 DIAGNOSIS — I25.10 ASCVD (ARTERIOSCLEROTIC CARDIOVASCULAR DISEASE): Primary | ICD-10-CM

## 2022-07-20 DIAGNOSIS — R07.2 PRECORDIAL PAIN: ICD-10-CM

## 2022-07-20 PROCEDURE — 93000 ELECTROCARDIOGRAM COMPLETE: CPT | Performed by: NURSE PRACTITIONER

## 2022-07-20 PROCEDURE — 99214 OFFICE O/P EST MOD 30 MIN: CPT | Performed by: NURSE PRACTITIONER

## 2022-07-20 RX ORDER — ISOSORBIDE MONONITRATE 60 MG/1
60 TABLET, EXTENDED RELEASE ORAL DAILY
Qty: 30 TABLET | Refills: 5 | Status: SHIPPED | OUTPATIENT
Start: 2022-07-20 | End: 2023-01-23 | Stop reason: SDUPTHER

## 2022-07-20 NOTE — PROGRESS NOTES
Lawrence Faulkner DO  Ivis Vela  1955 07/20/2022    Patient Active Problem List   Diagnosis   • Hypertension   • Asthma   • Osteoporosis   • Left arm pain   • Closed nondisplaced fracture of head of radius with routine healing       Dear Lawrence Faulkner DO:    Subjective     Chief Complaint   Patient presents with   • Med Management     Verbal.    • Chest Pain     Discomfort and pressure    • Dizziness     Low BP.    • Palpitations           History of Present Illness:    Ivis Vela is a 66 y.o. female with a past medical history of hypertension and non-obstructive ASCVD. She presents today for cardiology follow up due to recent chest pains. She has been having a lot of pressure in the epigastric region that radiates up into the bilateral chest wall and neck. It also radiates into the back. Denies any associated symptoms. Only a lasts a few minutes then goes away spontaneously. She is not a diabetic. She smokes e-cigarettes but smoked cigarettes for 40 years. She occasionally feels palpitations when she lays down at night.      No Known Allergies:      Current Outpatient Medications:   •  alendronate (FOSAMAX) 70 MG tablet, Take 70 mg by mouth Every 7 (Seven) Days., Disp: , Rfl:   •  amitriptyline (ELAVIL) 25 MG tablet, , Disp: , Rfl:   •  aspirin 325 MG tablet, Take 81 mg by mouth Daily., Disp: , Rfl:   •  atorvastatin (LIPITOR) 20 MG tablet, Take 20 mg by mouth Daily., Disp: , Rfl:   •  etodolac (LODINE) 400 MG tablet, , Disp: , Rfl:   •  HYDROcodone-acetaminophen (NORCO) 5-325 MG per tablet, Take 1 tablet by mouth Every 6 (Six) Hours As Needed for moderate pain (4-6)., Disp: 10 tablet, Rfl: 0  •  isosorbide mononitrate (IMDUR) 60 MG 24 hr tablet, Take 1 tablet by mouth Daily., Disp: 30 tablet, Rfl: 5  •  losartan-hydrochlorothiazide (HYZAAR) 50-12.5 MG per tablet, , Disp: , Rfl:   •  metoprolol tartrate (LOPRESSOR) 25 MG tablet, TAKE ONE TABLET BY MOUTH TWO TIMES A DAY, Disp: 60 tablet, Rfl: 3  •   "nitroglycerin (NITROSTAT) 0.4 MG SL tablet, 1 under the tongue as needed for angina, may repeat q5mins for up three doses, Disp: 25 tablet, Rfl: 2  •  pantoprazole (PROTONIX) 40 MG EC tablet, Take 40 mg by mouth Daily., Disp: , Rfl:       The following portions of the patient's history were reviewed and updated as appropriate: allergies, current medications, past family history, past medical history, past social history, past surgical history and problem list.    Social History     Tobacco Use   • Smoking status: Former Smoker     Types: Electronic Cigarette, Cigarettes     Quit date:      Years since quittin.5   • Smokeless tobacco: Never Used   Substance Use Topics   • Alcohol use: No   • Drug use: No       ROS    Objective   Vitals:    22 1041   BP: 127/76   BP Location: Right arm   Patient Position: Sitting   Cuff Size: Adult   Pulse: 71   SpO2: 99%   Weight: 81.2 kg (179 lb)   Height: 162.6 cm (64\")     Body mass index is 30.73 kg/m².        Vitals reviewed.   Constitutional:       Appearance: Healthy appearance. Well-developed and not in distress.   HENT:      Head: Normocephalic and atraumatic.   Neck:      Vascular: No JVD.   Pulmonary:      Effort: Pulmonary effort is normal.      Breath sounds: Normal breath sounds. No wheezing. No rales.   Cardiovascular:      Normal rate. Regular rhythm.      Murmurs: There is no murmur.      . No S3 and S4 gallop.   Edema:     Peripheral edema absent.   Abdominal:      General: Bowel sounds are normal.      Palpations: Abdomen is soft.   Skin:     General: Skin is warm and dry.   Neurological:      Mental Status: Alert, oriented to person, place, and time and oriented to person, place and time.   Psychiatric:         Mood and Affect: Mood normal.         Behavior: Behavior normal.         Lab Results   Component Value Date     2020    K 3.7 2020     2020    CO2 27.7 2020    BUN 8 2020    CREATININE 0.90 2021 "    GLUCOSE 120 (H) 07/24/2020    CALCIUM 9.9 07/24/2020    AST 14 07/24/2020    ALT 22 07/24/2020    ALKPHOS 100 07/24/2020     No results found for: CKTOTAL  Lab Results   Component Value Date    WBC 11.25 (H) 07/24/2020    HGB 15.1 07/24/2020    HCT 46.0 07/24/2020     07/24/2020       ECG 12 Lead    Date/Time: 7/20/2022 10:33 AM  Performed by: Baylee Chaudhary APRN  Authorized by: Baylee Chaudhary APRN   Comparison: compared with previous ECG   Rhythm: sinus rhythm  Ectopy: infrequent PVCs  BPM: 72  Other findings: non-specific ST-T wave changes              Assessment & Plan    Diagnosis Plan   1. ASCVD (arteriosclerotic cardiovascular disease)     2. Essential hypertension  ECG 12 Lead    Stress Test With Myocardial Perfusion One Day    isosorbide mononitrate (IMDUR) 60 MG 24 hr tablet   3. Family history of premature coronary artery disease  ECG 12 Lead    Stress Test With Myocardial Perfusion One Day    isosorbide mononitrate (IMDUR) 60 MG 24 hr tablet   4. Precordial pain  ECG 12 Lead    Stress Test With Myocardial Perfusion One Day    isosorbide mononitrate (IMDUR) 60 MG 24 hr tablet                Recommendations:    1. ASCVD-recent chest pains with some typical features concerning for angina.  Will evaluate further with a Lexiscan stress test.  Continue low-dose aspirin, atorvastatin, losartan, and metoprolol.  We will request most recent lipid panel from PCP.  Will adjust statin as indicated.  2. Essential hypertension-well-controlled.  3. Precordial pain-we will increase isosorbide to 60 mg daily.  4. Follow-up in 4 weeks or sooner if needed.        Return in about 4 weeks (around 8/17/2022) for Recheck.    As always, I appreciate very much the opportunity to participate in the cardiovascular care of your patients.      With Best Regards,    GAYLE Haskins

## 2022-07-22 ENCOUNTER — TELEPHONE (OUTPATIENT)
Dept: CARDIOLOGY | Facility: CLINIC | Age: 67
End: 2022-07-22

## 2022-07-22 NOTE — TELEPHONE ENCOUNTER
----- Message from GAYLE Haskins sent at 7/20/2022 11:35 AM EDT -----  Please get most recent lipid panel from PCP.       Called PCP. No Anwer

## 2022-08-26 ENCOUNTER — HOSPITAL ENCOUNTER (OUTPATIENT)
Dept: NUCLEAR MEDICINE | Facility: HOSPITAL | Age: 67
Discharge: HOME | End: 2022-08-26

## 2022-08-26 ENCOUNTER — HOSPITAL ENCOUNTER (OUTPATIENT)
Dept: CARDIOLOGY | Facility: HOSPITAL | Age: 67
Discharge: HOME | End: 2022-08-26

## 2022-08-26 DIAGNOSIS — R07.2 PRECORDIAL PAIN: ICD-10-CM

## 2022-08-26 DIAGNOSIS — Z82.49 FAMILY HISTORY OF PREMATURE CORONARY ARTERY DISEASE: ICD-10-CM

## 2022-08-26 DIAGNOSIS — I10 ESSENTIAL HYPERTENSION: ICD-10-CM

## 2022-08-26 LAB
BH CV NUCLEAR PRIOR STUDY: 3
BH CV REST NUCLEAR ISOTOPE DOSE: 9.9 MCI
BH CV STRESS BP STAGE 1: NORMAL
BH CV STRESS COMMENTS STAGE 1: NORMAL
BH CV STRESS DOSE REGADENOSON STAGE 1: 0.4
BH CV STRESS DURATION MIN STAGE 1: 0
BH CV STRESS DURATION SEC STAGE 1: 10
BH CV STRESS HR STAGE 1: 81
BH CV STRESS NUCLEAR ISOTOPE DOSE: 30.2 MCI
BH CV STRESS PROTOCOL 1: NORMAL
BH CV STRESS RECOVERY BP: NORMAL MMHG
BH CV STRESS RECOVERY HR: 80 BPM
BH CV STRESS STAGE 1: 1
MAXIMAL PREDICTED HEART RATE: 154 BPM
PERCENT MAX PREDICTED HR: 52.6 %
STRESS BASELINE BP: NORMAL MMHG
STRESS BASELINE HR: 64 BPM
STRESS PERCENT HR: 62 %
STRESS POST PEAK BP: NORMAL MMHG
STRESS POST PEAK HR: 81 BPM
STRESS TARGET HR: 131 BPM

## 2022-08-26 PROCEDURE — 25010000002 REGADENOSON 0.4 MG/5ML SOLUTION: Performed by: NURSE PRACTITIONER

## 2022-08-26 PROCEDURE — 0 TECHNETIUM SESTAMIBI: Performed by: NURSE PRACTITIONER

## 2022-08-26 PROCEDURE — 78452 HT MUSCLE IMAGE SPECT MULT: CPT

## 2022-08-26 PROCEDURE — 78452 HT MUSCLE IMAGE SPECT MULT: CPT | Performed by: INTERNAL MEDICINE

## 2022-08-26 PROCEDURE — 93017 CV STRESS TEST TRACING ONLY: CPT

## 2022-08-26 PROCEDURE — A9500 TC99M SESTAMIBI: HCPCS | Performed by: NURSE PRACTITIONER

## 2022-08-26 PROCEDURE — 93018 CV STRESS TEST I&R ONLY: CPT | Performed by: INTERNAL MEDICINE

## 2022-08-26 RX ADMIN — TECHNETIUM TC 99M SESTAMIBI 1 DOSE: 1 INJECTION INTRAVENOUS at 09:18

## 2022-08-26 RX ADMIN — TECHNETIUM TC 99M SESTAMIBI 1 DOSE: 1 INJECTION INTRAVENOUS at 08:15

## 2022-08-26 RX ADMIN — REGADENOSON 0.4 MG: 0.08 INJECTION, SOLUTION INTRAVENOUS at 09:18

## 2022-09-06 ENCOUNTER — OFFICE VISIT (OUTPATIENT)
Dept: CARDIOLOGY | Facility: CLINIC | Age: 67
End: 2022-09-06

## 2022-09-06 VITALS
HEART RATE: 73 BPM | WEIGHT: 178 LBS | BODY MASS INDEX: 29.66 KG/M2 | HEIGHT: 65 IN | RESPIRATION RATE: 16 BRPM | SYSTOLIC BLOOD PRESSURE: 151 MMHG | DIASTOLIC BLOOD PRESSURE: 85 MMHG

## 2022-09-06 DIAGNOSIS — I10 ESSENTIAL HYPERTENSION: ICD-10-CM

## 2022-09-06 DIAGNOSIS — R42 DIZZINESS: ICD-10-CM

## 2022-09-06 DIAGNOSIS — R07.2 PRECORDIAL PAIN: ICD-10-CM

## 2022-09-06 DIAGNOSIS — R00.2 PALPITATIONS: ICD-10-CM

## 2022-09-06 DIAGNOSIS — I25.10 ASCVD (ARTERIOSCLEROTIC CARDIOVASCULAR DISEASE): Primary | ICD-10-CM

## 2022-09-06 PROCEDURE — 93270 REMOTE 30 DAY ECG REV/REPORT: CPT | Performed by: INTERNAL MEDICINE

## 2022-09-06 PROCEDURE — 99214 OFFICE O/P EST MOD 30 MIN: CPT | Performed by: NURSE PRACTITIONER

## 2022-09-06 RX ORDER — METOPROLOL TARTRATE 50 MG/1
50 TABLET, FILM COATED ORAL 2 TIMES DAILY
Qty: 180 TABLET | Refills: 1 | Status: SHIPPED | OUTPATIENT
Start: 2022-09-06 | End: 2022-09-13 | Stop reason: SDUPTHER

## 2022-09-06 NOTE — PROGRESS NOTES
Lawrence Faulkner DO  Ivis Vela  1955 09/06/2022    Patient Active Problem List   Diagnosis   • Hypertension   • Asthma   • Osteoporosis   • Left arm pain   • Closed nondisplaced fracture of head of radius with routine healing       Dear Lawrence Faulkner DO:    Subjective     Chief Complaint   Patient presents with   • Follow-up     Stress findings   • Chest Pain     Sx's unchanged   • Palpitations     Flutters,races   • Med Management     List provided           History of Present Illness:    Ivis Vela is a 67 y.o. female with a past medical history of hypertension and non-obstructive ASCVD with recent chest pains. She presents today for cardiology follow up. She recently underwent lexiscan stress test which was negative for evidence of ischemia. Recently isosorbide was also increased. She continues to have some intermittent chest discomfort which radiates into her left shoulder and back. She cannot identify any aggravating or alleviating factors. She does have a left shoulder injury for which she is following with orthopedics. She has also been having palpitations which feels like her heart pounding and racing. This occurs often while resting. She has associated dizziness and lightheadedness but denies syncope. Her blood pressure has also been elevated.          No Known Allergies:      Current Outpatient Medications:   •  alendronate (FOSAMAX) 70 MG tablet, Take 70 mg by mouth Every 7 (Seven) Days., Disp: , Rfl:   •  amitriptyline (ELAVIL) 25 MG tablet, , Disp: , Rfl:   •  aspirin 325 MG tablet, Take 81 mg by mouth Daily., Disp: , Rfl:   •  atorvastatin (LIPITOR) 20 MG tablet, Take 20 mg by mouth Daily., Disp: , Rfl:   •  isosorbide mononitrate (IMDUR) 60 MG 24 hr tablet, Take 1 tablet by mouth Daily., Disp: 30 tablet, Rfl: 5  •  losartan-hydrochlorothiazide (HYZAAR) 50-12.5 MG per tablet, , Disp: , Rfl:   •  nitroglycerin (NITROSTAT) 0.4 MG SL tablet, 1 under the tongue as needed for angina, may repeat  "q5mins for up three doses, Disp: 25 tablet, Rfl: 2  •  pantoprazole (PROTONIX) 40 MG EC tablet, Take 40 mg by mouth Daily., Disp: , Rfl:   •  etodolac (LODINE) 400 MG tablet, , Disp: , Rfl:   •  HYDROcodone-acetaminophen (NORCO) 5-325 MG per tablet, Take 1 tablet by mouth Every 6 (Six) Hours As Needed for moderate pain (4-6)., Disp: 10 tablet, Rfl: 0  •  metoprolol tartrate (LOPRESSOR) 50 MG tablet, Take 1 tablet by mouth 2 (Two) Times a Day., Disp: 180 tablet, Rfl: 1      The following portions of the patient's history were reviewed and updated as appropriate: allergies, current medications, past family history, past medical history, past social history, past surgical history and problem list.    Social History     Tobacco Use   • Smoking status: Former Smoker     Types: Electronic Cigarette, Cigarettes     Quit date:      Years since quittin.6   • Smokeless tobacco: Never Used   Substance Use Topics   • Alcohol use: No   • Drug use: No       Review of Systems   Constitutional: Negative for decreased appetite and malaise/fatigue.   Cardiovascular: Positive for chest pain and palpitations. Negative for dyspnea on exertion.   Respiratory: Negative for cough and shortness of breath.        Objective   Vitals:    22 0913   BP: 151/85   Pulse: 73   Resp: 16   Weight: 80.7 kg (178 lb)   Height: 165.1 cm (65\")     Body mass index is 29.62 kg/m².        Vitals reviewed.   Constitutional:       Appearance: Healthy appearance. Well-developed and not in distress.   HENT:      Head: Normocephalic and atraumatic.   Neck:      Vascular: No JVD.   Pulmonary:      Effort: Pulmonary effort is normal.      Breath sounds: Normal breath sounds. No wheezing. No rales.   Cardiovascular:      Normal rate. Regular rhythm.      Murmurs: There is no murmur.      . No S3 and S4 gallop.   Edema:     Peripheral edema absent.   Abdominal:      General: Bowel sounds are normal.      Palpations: Abdomen is soft.   Skin:     General: " Skin is warm and dry.   Neurological:      Mental Status: Alert, oriented to person, place, and time and oriented to person, place and time.   Psychiatric:         Mood and Affect: Mood normal.         Behavior: Behavior normal.         Lab Results   Component Value Date     07/24/2020    K 3.7 07/24/2020     07/24/2020    CO2 27.7 07/24/2020    BUN 8 07/24/2020    CREATININE 0.90 08/19/2021    GLUCOSE 120 (H) 07/24/2020    CALCIUM 9.9 07/24/2020    AST 14 07/24/2020    ALT 22 07/24/2020    ALKPHOS 100 07/24/2020     No results found for: CKTOTAL  Lab Results   Component Value Date    WBC 11.25 (H) 07/24/2020    HGB 15.1 07/24/2020    HCT 46.0 07/24/2020     07/24/2020     No results found for: INR  No results found for: MG  No results found for: TSH, PSA, CHLPL, TRIG, HDL, LDL   No results found for: BNP        Procedures      Assessment & Plan    Diagnosis Plan   1. ASCVD (arteriosclerotic cardiovascular disease)     2. Essential hypertension     3. Precordial pain  metoprolol tartrate (LOPRESSOR) 50 MG tablet   4. Palpitations  Cardiac Event Monitor    metoprolol tartrate (LOPRESSOR) 50 MG tablet   5. Dizziness  Cardiac Event Monitor    metoprolol tartrate (LOPRESSOR) 50 MG tablet                Recommendations:    1. ASCVD - recent stress test negative for evidence of ischemia. Will increase metoprolol tartrate to 50 mg BID. Continue low dose aspirin, atorvastatin, isosorbide, and losartan/HCTZ.  2. Essential hypertension - elevated today. Will monitor for improvement with increase in metoprolol dosage.  3. Palpitations/dizziness- we will evaluate further with an event monitor.  4. Follow-up in 6 weeks or sooner if needed.        Return in about 6 weeks (around 10/18/2022) for Recheck.    As always, I appreciate very much the opportunity to participate in the cardiovascular care of your patients.      With Best Regards,    GAYLE Haskins

## 2022-09-13 ENCOUNTER — TELEPHONE (OUTPATIENT)
Dept: CARDIOLOGY | Facility: CLINIC | Age: 67
End: 2022-09-13

## 2022-09-13 DIAGNOSIS — I48.0 PAROXYSMAL ATRIAL FIBRILLATION: Primary | ICD-10-CM

## 2022-09-13 DIAGNOSIS — R42 DIZZINESS: ICD-10-CM

## 2022-09-13 DIAGNOSIS — R00.2 PALPITATIONS: ICD-10-CM

## 2022-09-13 DIAGNOSIS — R07.2 PRECORDIAL PAIN: ICD-10-CM

## 2022-09-13 RX ORDER — METOPROLOL TARTRATE 50 MG/1
25 TABLET, FILM COATED ORAL 2 TIMES DAILY
Qty: 180 TABLET | Refills: 1 | Status: ON HOLD
Start: 2022-09-13 | End: 2022-10-19 | Stop reason: SDUPTHER

## 2022-09-13 NOTE — PROGRESS NOTES
I called the patient and informed her of the event monitor reading consistent with atrial fibrillation. Will start Eliquis 5 mg twice daily since her CHADSVASc score is at least 3 for Age, gender, and hypertension. I asked her to monitor for signs of bleeding. (she denies any history of bleeding in the past). CBC in one week. I also advised her to decrease metoprolol tartrate to 25 mg twice daily due to bradycardia. Will continue to monitor since she has several weeks left wearing the event monitor. She voiced understanding.

## 2022-09-13 NOTE — TELEPHONE ENCOUNTER
The patient has had an abnormal event monitor reading, I have tried to contact her x 2, but no answer. Please try to contact her again. I can discuss the findings with her via telephone. Thank you!

## 2022-09-13 NOTE — TELEPHONE ENCOUNTER
I returned her call. I ordered a CBC to be done in one week. She would like to have this done at her PCP office. Will you please fax lab order to them? Thanks.

## 2022-09-16 DIAGNOSIS — R00.2 PALPITATIONS: ICD-10-CM

## 2022-09-16 DIAGNOSIS — R42 DIZZINESS: ICD-10-CM

## 2022-09-21 ENCOUNTER — LAB (OUTPATIENT)
Dept: LAB | Facility: HOSPITAL | Age: 67
End: 2022-09-21

## 2022-09-21 DIAGNOSIS — I48.0 PAROXYSMAL ATRIAL FIBRILLATION: ICD-10-CM

## 2022-09-21 LAB
BASOPHILS # BLD AUTO: 0.04 10*3/MM3 (ref 0–0.2)
BASOPHILS NFR BLD AUTO: 0.4 % (ref 0–1.5)
DEPRECATED RDW RBC AUTO: 41.3 FL (ref 37–54)
EOSINOPHIL # BLD AUTO: 0.1 10*3/MM3 (ref 0–0.4)
EOSINOPHIL NFR BLD AUTO: 1.1 % (ref 0.3–6.2)
ERYTHROCYTE [DISTWIDTH] IN BLOOD BY AUTOMATED COUNT: 12.8 % (ref 12.3–15.4)
HCT VFR BLD AUTO: 42.4 % (ref 34–46.6)
HGB BLD-MCNC: 14.1 G/DL (ref 12–15.9)
IMM GRANULOCYTES # BLD AUTO: 0.05 10*3/MM3 (ref 0–0.05)
IMM GRANULOCYTES NFR BLD AUTO: 0.5 % (ref 0–0.5)
LYMPHOCYTES # BLD AUTO: 2.75 10*3/MM3 (ref 0.7–3.1)
LYMPHOCYTES NFR BLD AUTO: 29.2 % (ref 19.6–45.3)
MCH RBC QN AUTO: 29.7 PG (ref 26.6–33)
MCHC RBC AUTO-ENTMCNC: 33.3 G/DL (ref 31.5–35.7)
MCV RBC AUTO: 89.5 FL (ref 79–97)
MONOCYTES # BLD AUTO: 0.75 10*3/MM3 (ref 0.1–0.9)
MONOCYTES NFR BLD AUTO: 8 % (ref 5–12)
NEUTROPHILS NFR BLD AUTO: 5.72 10*3/MM3 (ref 1.7–7)
NEUTROPHILS NFR BLD AUTO: 60.8 % (ref 42.7–76)
NRBC BLD AUTO-RTO: 0 /100 WBC (ref 0–0.2)
PLATELET # BLD AUTO: 339 10*3/MM3 (ref 140–450)
PMV BLD AUTO: 11.3 FL (ref 6–12)
RBC # BLD AUTO: 4.74 10*6/MM3 (ref 3.77–5.28)
WBC NRBC COR # BLD: 9.41 10*3/MM3 (ref 3.4–10.8)

## 2022-09-21 PROCEDURE — 85025 COMPLETE CBC W/AUTO DIFF WBC: CPT

## 2022-09-21 PROCEDURE — 36415 COLL VENOUS BLD VENIPUNCTURE: CPT

## 2022-10-10 ENCOUNTER — TREATMENT (OUTPATIENT)
Dept: CARDIOLOGY | Facility: CLINIC | Age: 67
End: 2022-10-10

## 2022-10-10 DIAGNOSIS — R00.2 PALPITATIONS: Primary | ICD-10-CM

## 2022-10-10 DIAGNOSIS — R42 DIZZINESS AND GIDDINESS: ICD-10-CM

## 2022-10-13 PROCEDURE — 93272 ECG/REVIEW INTERPRET ONLY: CPT | Performed by: INTERNAL MEDICINE

## 2022-10-14 PROBLEM — R42 DIZZINESS AND GIDDINESS: Status: ACTIVE | Noted: 2022-10-14

## 2022-10-14 PROBLEM — R00.2 PALPITATIONS: Status: ACTIVE | Noted: 2022-10-14

## 2022-10-18 ENCOUNTER — APPOINTMENT (OUTPATIENT)
Dept: GENERAL RADIOLOGY | Facility: HOSPITAL | Age: 67
End: 2022-10-18

## 2022-10-18 ENCOUNTER — HOSPITAL ENCOUNTER (INPATIENT)
Facility: HOSPITAL | Age: 67
LOS: 1 days | Discharge: HOME | End: 2022-10-19
Attending: STUDENT IN AN ORGANIZED HEALTH CARE EDUCATION/TRAINING PROGRAM | Admitting: STUDENT IN AN ORGANIZED HEALTH CARE EDUCATION/TRAINING PROGRAM

## 2022-10-18 ENCOUNTER — OFFICE VISIT (OUTPATIENT)
Dept: CARDIOLOGY | Facility: CLINIC | Age: 67
End: 2022-10-18

## 2022-10-18 VITALS
BODY MASS INDEX: 30.12 KG/M2 | SYSTOLIC BLOOD PRESSURE: 149 MMHG | HEIGHT: 65 IN | HEART RATE: 70 BPM | WEIGHT: 180.8 LBS | DIASTOLIC BLOOD PRESSURE: 83 MMHG

## 2022-10-18 DIAGNOSIS — I25.10 ASCVD (ARTERIOSCLEROTIC CARDIOVASCULAR DISEASE): Primary | ICD-10-CM

## 2022-10-18 DIAGNOSIS — R42 DIZZINESS: ICD-10-CM

## 2022-10-18 DIAGNOSIS — I20.0 UNSTABLE ANGINA: Primary | ICD-10-CM

## 2022-10-18 DIAGNOSIS — R00.2 PALPITATIONS: ICD-10-CM

## 2022-10-18 DIAGNOSIS — I48.0 PAROXYSMAL ATRIAL FIBRILLATION: ICD-10-CM

## 2022-10-18 DIAGNOSIS — R07.2 PRECORDIAL PAIN: ICD-10-CM

## 2022-10-18 DIAGNOSIS — I47.20 VENTRICULAR TACHYCARDIA: ICD-10-CM

## 2022-10-18 DIAGNOSIS — R07.9 CHEST PAIN, UNSPECIFIED TYPE: ICD-10-CM

## 2022-10-18 LAB
ALBUMIN SERPL-MCNC: 4.19 G/DL (ref 3.5–5.2)
ALBUMIN/GLOB SERPL: 1.7 G/DL
ALP SERPL-CCNC: 92 U/L (ref 39–117)
ALT SERPL W P-5'-P-CCNC: 14 U/L (ref 1–33)
ANION GAP SERPL CALCULATED.3IONS-SCNC: 8.7 MMOL/L (ref 5–15)
APTT PPP: 35.9 SECONDS (ref 26.5–34.5)
AST SERPL-CCNC: 12 U/L (ref 1–32)
BASOPHILS # BLD AUTO: 0.06 10*3/MM3 (ref 0–0.2)
BASOPHILS NFR BLD AUTO: 0.8 % (ref 0–1.5)
BILIRUB SERPL-MCNC: 0.3 MG/DL (ref 0–1.2)
BUN SERPL-MCNC: 8 MG/DL (ref 8–23)
BUN/CREAT SERPL: 9.3 (ref 7–25)
CALCIUM SPEC-SCNC: 9.5 MG/DL (ref 8.6–10.5)
CHLORIDE SERPL-SCNC: 104 MMOL/L (ref 98–107)
CHOLEST SERPL-MCNC: 158 MG/DL (ref 0–200)
CO2 SERPL-SCNC: 29.3 MMOL/L (ref 22–29)
CREAT SERPL-MCNC: 0.86 MG/DL (ref 0.57–1)
D DIMER PPP FEU-MCNC: <0.27 MCGFEU/ML (ref 0–0.5)
DEPRECATED RDW RBC AUTO: 45.1 FL (ref 37–54)
EGFRCR SERPLBLD CKD-EPI 2021: 74.6 ML/MIN/1.73
EOSINOPHIL # BLD AUTO: 0.08 10*3/MM3 (ref 0–0.4)
EOSINOPHIL NFR BLD AUTO: 1.1 % (ref 0.3–6.2)
ERYTHROCYTE [DISTWIDTH] IN BLOOD BY AUTOMATED COUNT: 13.5 % (ref 12.3–15.4)
FLUAV RNA RESP QL NAA+PROBE: NOT DETECTED
FLUBV RNA RESP QL NAA+PROBE: NOT DETECTED
GLOBULIN UR ELPH-MCNC: 2.4 GM/DL
GLUCOSE SERPL-MCNC: 110 MG/DL (ref 65–99)
HBA1C MFR BLD: 5.9 % (ref 4.8–5.6)
HCT VFR BLD AUTO: 42.5 % (ref 34–46.6)
HDLC SERPL-MCNC: 55 MG/DL (ref 40–60)
HGB BLD-MCNC: 13.9 G/DL (ref 12–15.9)
IMM GRANULOCYTES # BLD AUTO: 0.02 10*3/MM3 (ref 0–0.05)
IMM GRANULOCYTES NFR BLD AUTO: 0.3 % (ref 0–0.5)
INR PPP: 1.03 (ref 0.9–1.1)
LDLC SERPL CALC-MCNC: 85 MG/DL (ref 0–100)
LDLC/HDLC SERPL: 1.52 {RATIO}
LYMPHOCYTES # BLD AUTO: 2.32 10*3/MM3 (ref 0.7–3.1)
LYMPHOCYTES NFR BLD AUTO: 32.3 % (ref 19.6–45.3)
MAGNESIUM SERPL-MCNC: 2.5 MG/DL (ref 1.6–2.4)
MCH RBC QN AUTO: 29.8 PG (ref 26.6–33)
MCHC RBC AUTO-ENTMCNC: 32.7 G/DL (ref 31.5–35.7)
MCV RBC AUTO: 91 FL (ref 79–97)
MONOCYTES # BLD AUTO: 0.55 10*3/MM3 (ref 0.1–0.9)
MONOCYTES NFR BLD AUTO: 7.6 % (ref 5–12)
NEUTROPHILS NFR BLD AUTO: 4.16 10*3/MM3 (ref 1.7–7)
NEUTROPHILS NFR BLD AUTO: 57.9 % (ref 42.7–76)
NRBC BLD AUTO-RTO: 0 /100 WBC (ref 0–0.2)
NT-PROBNP SERPL-MCNC: 109.1 PG/ML (ref 0–900)
PLATELET # BLD AUTO: 302 10*3/MM3 (ref 140–450)
PMV BLD AUTO: 10.7 FL (ref 6–12)
POTASSIUM SERPL-SCNC: 4.3 MMOL/L (ref 3.5–5.2)
PROT SERPL-MCNC: 6.6 G/DL (ref 6–8.5)
PROTHROMBIN TIME: 13.7 SECONDS (ref 12.1–14.7)
QT INTERVAL: 410 MS
QTC INTERVAL: 366 MS
RBC # BLD AUTO: 4.67 10*6/MM3 (ref 3.77–5.28)
SARS-COV-2 RNA RESP QL NAA+PROBE: NOT DETECTED
SODIUM SERPL-SCNC: 142 MMOL/L (ref 136–145)
TRIGL SERPL-MCNC: 96 MG/DL (ref 0–150)
TROPONIN T SERPL-MCNC: <0.01 NG/ML (ref 0–0.03)
TROPONIN T SERPL-MCNC: <0.01 NG/ML (ref 0–0.03)
TSH SERPL DL<=0.05 MIU/L-ACNC: 2.23 UIU/ML (ref 0.27–4.2)
VLDLC SERPL-MCNC: 18 MG/DL (ref 5–40)
WBC NRBC COR # BLD: 7.19 10*3/MM3 (ref 3.4–10.8)

## 2022-10-18 PROCEDURE — 25010000002 HEPARIN (PORCINE) PER 1000 UNITS: Performed by: STUDENT IN AN ORGANIZED HEALTH CARE EDUCATION/TRAINING PROGRAM

## 2022-10-18 PROCEDURE — 25010000002 HEPARIN (PORCINE) 25000-0.45 UT/250ML-% SOLUTION: Performed by: STUDENT IN AN ORGANIZED HEALTH CARE EDUCATION/TRAINING PROGRAM

## 2022-10-18 PROCEDURE — 93005 ELECTROCARDIOGRAM TRACING: CPT | Performed by: STUDENT IN AN ORGANIZED HEALTH CARE EDUCATION/TRAINING PROGRAM

## 2022-10-18 PROCEDURE — 99284 EMERGENCY DEPT VISIT MOD MDM: CPT

## 2022-10-18 PROCEDURE — 99223 1ST HOSP IP/OBS HIGH 75: CPT | Performed by: STUDENT IN AN ORGANIZED HEALTH CARE EDUCATION/TRAINING PROGRAM

## 2022-10-18 PROCEDURE — 71045 X-RAY EXAM CHEST 1 VIEW: CPT

## 2022-10-18 PROCEDURE — 93000 ELECTROCARDIOGRAM COMPLETE: CPT | Performed by: NURSE PRACTITIONER

## 2022-10-18 PROCEDURE — 85610 PROTHROMBIN TIME: CPT | Performed by: STUDENT IN AN ORGANIZED HEALTH CARE EDUCATION/TRAINING PROGRAM

## 2022-10-18 PROCEDURE — 36415 COLL VENOUS BLD VENIPUNCTURE: CPT

## 2022-10-18 PROCEDURE — 83880 ASSAY OF NATRIURETIC PEPTIDE: CPT | Performed by: PHYSICIAN ASSISTANT

## 2022-10-18 PROCEDURE — 99222 1ST HOSP IP/OBS MODERATE 55: CPT | Performed by: INTERNAL MEDICINE

## 2022-10-18 PROCEDURE — 80061 LIPID PANEL: CPT | Performed by: STUDENT IN AN ORGANIZED HEALTH CARE EDUCATION/TRAINING PROGRAM

## 2022-10-18 PROCEDURE — 93010 ELECTROCARDIOGRAM REPORT: CPT | Performed by: INTERNAL MEDICINE

## 2022-10-18 PROCEDURE — 85379 FIBRIN DEGRADATION QUANT: CPT | Performed by: PHYSICIAN ASSISTANT

## 2022-10-18 PROCEDURE — 71045 X-RAY EXAM CHEST 1 VIEW: CPT | Performed by: RADIOLOGY

## 2022-10-18 PROCEDURE — 80053 COMPREHEN METABOLIC PANEL: CPT | Performed by: PHYSICIAN ASSISTANT

## 2022-10-18 PROCEDURE — 84484 ASSAY OF TROPONIN QUANT: CPT | Performed by: PHYSICIAN ASSISTANT

## 2022-10-18 PROCEDURE — 85730 THROMBOPLASTIN TIME PARTIAL: CPT | Performed by: STUDENT IN AN ORGANIZED HEALTH CARE EDUCATION/TRAINING PROGRAM

## 2022-10-18 PROCEDURE — 83036 HEMOGLOBIN GLYCOSYLATED A1C: CPT | Performed by: STUDENT IN AN ORGANIZED HEALTH CARE EDUCATION/TRAINING PROGRAM

## 2022-10-18 PROCEDURE — 87636 SARSCOV2 & INF A&B AMP PRB: CPT | Performed by: PHYSICIAN ASSISTANT

## 2022-10-18 PROCEDURE — 99214 OFFICE O/P EST MOD 30 MIN: CPT | Performed by: NURSE PRACTITIONER

## 2022-10-18 PROCEDURE — 85025 COMPLETE CBC W/AUTO DIFF WBC: CPT | Performed by: PHYSICIAN ASSISTANT

## 2022-10-18 PROCEDURE — 84443 ASSAY THYROID STIM HORMONE: CPT | Performed by: STUDENT IN AN ORGANIZED HEALTH CARE EDUCATION/TRAINING PROGRAM

## 2022-10-18 PROCEDURE — 83735 ASSAY OF MAGNESIUM: CPT | Performed by: STUDENT IN AN ORGANIZED HEALTH CARE EDUCATION/TRAINING PROGRAM

## 2022-10-18 RX ORDER — LOSARTAN POTASSIUM AND HYDROCHLOROTHIAZIDE 12.5; 1 MG/1; MG/1
0.5 TABLET ORAL DAILY
COMMUNITY

## 2022-10-18 RX ORDER — LOSARTAN POTASSIUM 50 MG/1
50 TABLET ORAL
Status: DISCONTINUED | OUTPATIENT
Start: 2022-10-19 | End: 2022-10-19

## 2022-10-18 RX ORDER — POLYETHYLENE GLYCOL 3350 17 G/17G
17 POWDER, FOR SOLUTION ORAL DAILY PRN
Status: DISCONTINUED | OUTPATIENT
Start: 2022-10-18 | End: 2022-10-19 | Stop reason: HOSPADM

## 2022-10-18 RX ORDER — ASPIRIN 81 MG/1
81 TABLET ORAL DAILY
COMMUNITY

## 2022-10-18 RX ORDER — ONDANSETRON 2 MG/ML
4 INJECTION INTRAMUSCULAR; INTRAVENOUS EVERY 6 HOURS PRN
Status: DISCONTINUED | OUTPATIENT
Start: 2022-10-18 | End: 2022-10-19 | Stop reason: HOSPADM

## 2022-10-18 RX ORDER — NITROGLYCERIN 0.4 MG/1
0.4 TABLET SUBLINGUAL
Status: DISCONTINUED | OUTPATIENT
Start: 2022-10-18 | End: 2022-10-19 | Stop reason: HOSPADM

## 2022-10-18 RX ORDER — ASPIRIN 325 MG
325 TABLET, DELAYED RELEASE (ENTERIC COATED) ORAL ONCE
Status: COMPLETED | OUTPATIENT
Start: 2022-10-18 | End: 2022-10-18

## 2022-10-18 RX ORDER — ACETAMINOPHEN 325 MG/1
650 TABLET ORAL EVERY 4 HOURS PRN
Status: DISCONTINUED | OUTPATIENT
Start: 2022-10-18 | End: 2022-10-19 | Stop reason: HOSPADM

## 2022-10-18 RX ORDER — HEPARIN SODIUM 5000 [USP'U]/ML
30 INJECTION, SOLUTION INTRAVENOUS; SUBCUTANEOUS AS NEEDED
Status: DISCONTINUED | OUTPATIENT
Start: 2022-10-18 | End: 2022-10-19

## 2022-10-18 RX ORDER — SODIUM CHLORIDE 0.9 % (FLUSH) 0.9 %
10 SYRINGE (ML) INJECTION EVERY 12 HOURS SCHEDULED
Status: DISCONTINUED | OUTPATIENT
Start: 2022-10-18 | End: 2022-10-19 | Stop reason: HOSPADM

## 2022-10-18 RX ORDER — ISOSORBIDE MONONITRATE 60 MG/1
60 TABLET, EXTENDED RELEASE ORAL DAILY
Status: DISCONTINUED | OUTPATIENT
Start: 2022-10-18 | End: 2022-10-18

## 2022-10-18 RX ORDER — ISOSORBIDE MONONITRATE 60 MG/1
60 TABLET, EXTENDED RELEASE ORAL DAILY
Status: DISCONTINUED | OUTPATIENT
Start: 2022-10-19 | End: 2022-10-19 | Stop reason: HOSPADM

## 2022-10-18 RX ORDER — AMITRIPTYLINE HYDROCHLORIDE 25 MG/1
25 TABLET, FILM COATED ORAL NIGHTLY
Status: DISCONTINUED | OUTPATIENT
Start: 2022-10-18 | End: 2022-10-19 | Stop reason: HOSPADM

## 2022-10-18 RX ORDER — ASPIRIN 81 MG/1
81 TABLET ORAL DAILY
Status: CANCELLED | OUTPATIENT
Start: 2022-10-18

## 2022-10-18 RX ORDER — HEPARIN SODIUM 10000 [USP'U]/100ML
12 INJECTION, SOLUTION INTRAVENOUS
Status: DISCONTINUED | OUTPATIENT
Start: 2022-10-18 | End: 2022-10-19

## 2022-10-18 RX ORDER — ASPIRIN 81 MG/1
81 TABLET ORAL DAILY
Status: DISCONTINUED | OUTPATIENT
Start: 2022-10-19 | End: 2022-10-19 | Stop reason: HOSPADM

## 2022-10-18 RX ORDER — ATORVASTATIN CALCIUM 40 MG/1
40 TABLET, FILM COATED ORAL NIGHTLY
Status: DISCONTINUED | OUTPATIENT
Start: 2022-10-18 | End: 2022-10-19

## 2022-10-18 RX ORDER — BISACODYL 10 MG
10 SUPPOSITORY, RECTAL RECTAL DAILY PRN
Status: DISCONTINUED | OUTPATIENT
Start: 2022-10-18 | End: 2022-10-19 | Stop reason: HOSPADM

## 2022-10-18 RX ORDER — PANTOPRAZOLE SODIUM 40 MG/1
40 TABLET, DELAYED RELEASE ORAL DAILY
Status: DISCONTINUED | OUTPATIENT
Start: 2022-10-18 | End: 2022-10-19 | Stop reason: HOSPADM

## 2022-10-18 RX ORDER — HEPARIN SODIUM 5000 [USP'U]/ML
60 INJECTION, SOLUTION INTRAVENOUS; SUBCUTANEOUS ONCE
Status: COMPLETED | OUTPATIENT
Start: 2022-10-18 | End: 2022-10-18

## 2022-10-18 RX ORDER — SODIUM CHLORIDE 0.9 % (FLUSH) 0.9 %
10 SYRINGE (ML) INJECTION AS NEEDED
Status: DISCONTINUED | OUTPATIENT
Start: 2022-10-18 | End: 2022-10-19 | Stop reason: HOSPADM

## 2022-10-18 RX ORDER — HEPARIN SODIUM 5000 [USP'U]/ML
60 INJECTION, SOLUTION INTRAVENOUS; SUBCUTANEOUS AS NEEDED
Status: DISCONTINUED | OUTPATIENT
Start: 2022-10-18 | End: 2022-10-19

## 2022-10-18 RX ORDER — BISACODYL 5 MG/1
5 TABLET, DELAYED RELEASE ORAL DAILY PRN
Status: DISCONTINUED | OUTPATIENT
Start: 2022-10-18 | End: 2022-10-19 | Stop reason: HOSPADM

## 2022-10-18 RX ORDER — AMOXICILLIN 250 MG
2 CAPSULE ORAL 2 TIMES DAILY
Status: DISCONTINUED | OUTPATIENT
Start: 2022-10-18 | End: 2022-10-19 | Stop reason: HOSPADM

## 2022-10-18 RX ADMIN — DOCUSATE SODIUM 50 MG AND SENNOSIDES 8.6 MG 2 TABLET: 8.6; 5 TABLET, FILM COATED ORAL at 20:32

## 2022-10-18 RX ADMIN — AMITRIPTYLINE HYDROCHLORIDE 25 MG: 25 TABLET, FILM COATED ORAL at 20:32

## 2022-10-18 RX ADMIN — ASPIRIN 325 MG: 325 TABLET, COATED ORAL at 11:24

## 2022-10-18 RX ADMIN — PANTOPRAZOLE SODIUM 40 MG: 40 TABLET, DELAYED RELEASE ORAL at 20:32

## 2022-10-18 RX ADMIN — HEPARIN SODIUM 4900 UNITS: 5000 INJECTION INTRAVENOUS; SUBCUTANEOUS at 18:32

## 2022-10-18 RX ADMIN — Medication 10 ML: at 20:33

## 2022-10-18 RX ADMIN — ATORVASTATIN CALCIUM 40 MG: 40 TABLET, FILM COATED ORAL at 20:32

## 2022-10-18 RX ADMIN — HEPARIN SODIUM 12 UNITS/KG/HR: 10000 INJECTION, SOLUTION INTRAVENOUS at 18:32

## 2022-10-18 RX ADMIN — METOPROLOL TARTRATE 12.5 MG: 25 TABLET, FILM COATED ORAL at 20:32

## 2022-10-18 NOTE — H&P
"    [facility] HISTORY AND PHYSICAL    Patient Identification:  Name:  Ivis Vela  Age:  67 y.o.  Sex:  female  :  1955  MRN:  [id number]   Admit Date: 10/18/2022   Visit Number:  [id number]  Room number:  406/06  Primary Care Physician:  Lawrence Faulkner DO     Subjective     Chief complaint:    Chief Complaint   Patient presents with   • Chest Pain       History of presenting illness:   Patient is a 67-year-old female with history significant for hypertension, hyperlipidemia and tobacco abuse who presented to the ER from outpatient cardiology for chest pressure and presyncopal episodes.  Patient states this morning she started experiencing tightness/heaviness in her left chest with radiation to her shoulder and jaw.  She did not experience nausea or emesis and did not feel particularly short of breath.  She says she does not notice pain or pressure getting worse on exertion but it does occur while she is at rest.  She also notes that the symptoms have occurred more commonly.  More notably she reports a several month history of palpitations resulting in dizziness/lightheadedness.  She says when these events occur, she has to reach out to catch her self to keep from falling and feels like she \"almost passes out \".  She denies completely passing out.  At the time of exam, she denies any chest pain/pressure, palpitations or shortness of breath.    She has been following with outpatient cardiology.  She had a negative stress test in August and most recent catheterization was in .  She recently had a 30-day Holter monitor placed which noted an episode of A. fib in which she was started on Eliquis (last dose taken was 10/15), PVCs, nonsustained V. Tach-longest run was 25 beats at a rate of 200 bpm.    In the ER, troponins were negative x2 and EKG was without ischemic changes.  She received a full dose aspirin and medicine was called for " admission.    ---------------------------------------------------------------------------------------------------------------------   Review of Systems   Constitutional: Negative for chills, diaphoresis, fatigue and fever.   HENT: Negative for ear pain, rhinorrhea and sore throat.    Respiratory: Positive for chest tightness and shortness of breath. Negative for cough, choking, wheezing and stridor.    Cardiovascular: Positive for chest pain and palpitations. Negative for leg swelling.   Gastrointestinal: Negative for abdominal pain, diarrhea, nausea and vomiting.   Endocrine: Negative for polydipsia, polyphagia and polyuria.   Genitourinary: Negative for dysuria, hematuria and urgency.   Musculoskeletal: Negative for back pain and myalgias.   Neurological: Positive for dizziness, syncope and light-headedness.     ---------------------------------------------------------------------------------------------------------------------   Past Medical History:   Diagnosis Date   • Arm fracture    • Hyperlipidemia    • Hypertension    • Osteoporosis      Past Surgical History:   Procedure Laterality Date   • CARPAL TUNNEL RELEASE Bilateral    • CATARACT EXTRACTION     • CHOLECYSTECTOMY     • ESOPHAGOSCOPY W/ DILATION     • HYSTERECTOMY       Family History   Problem Relation Age of Onset   • Hypertension Mother    • Heart disease Father    • Hypertension Father    • Hypertension Sister    • Heart disease Brother    • Hypertension Brother    • Diabetes Brother    • Breast cancer Paternal Cousin    • Breast cancer Maternal Cousin      Social History     Socioeconomic History   • Marital status:    Tobacco Use   • Smoking status: Former     Types: Electronic Cigarette, Cigarettes     Quit date:      Years since quittin.8   • Smokeless tobacco: Never   Substance and Sexual Activity   • Alcohol use: No   • Drug use: No      ---------------------------------------------------------------------------------------------------------------------   Allergies:  Patient has no known allergies.  ---------------------------------------------------------------------------------------------------------------------   Medications below are reported home medications pulling from within the system; at this time, these medications have not been reconciled unless otherwise specified and are in the verification process for further verifcation as current home medications.    Prior to Admission Medications     Prescriptions Last Dose Informant Patient Reported? Taking?    alendronate (FOSAMAX) 70 MG tablet   Yes No    Take 70 mg by mouth Every 7 (Seven) Days.    amitriptyline (ELAVIL) 25 MG tablet   Yes No    aspirin 325 MG tablet   Yes No    Take 81 mg by mouth Daily.    atorvastatin (LIPITOR) 20 MG tablet   Yes No    Take 20 mg by mouth Daily.    isosorbide mononitrate (IMDUR) 60 MG 24 hr tablet   No No    Take 1 tablet by mouth Daily.    losartan-hydrochlorothiazide (HYZAAR) 50-12.5 MG per tablet   Yes No    metoprolol tartrate (LOPRESSOR) 50 MG tablet   No No    Take 0.5 tablets by mouth 2 (Two) Times a Day.    nitroglycerin (NITROSTAT) 0.4 MG SL tablet   No No    1 under the tongue as needed for angina, may repeat q5mins for up three doses    pantoprazole (PROTONIX) 40 MG EC tablet   Yes No    Take 40 mg by mouth Daily.        Objective     Vital Signs:  Temp:  [97.9 °F (36.6 °C)] 97.9 °F (36.6 °C)  Heart Rate:  [48-70] 56  Resp:  [16] 16  BP: (124-154)/(78-96) 152/87    Mean Arterial Pressure (Non-Invasive) for the past 24 hrs (Last 3 readings):   Noninvasive MAP (mmHg)   10/18/22 1331 105   10/18/22 1231 112   10/18/22 1201 110     SpO2:  [97 %-100 %] 100 %  on   ;   Device (Oxygen Therapy): room air  Body mass index is 29.95 kg/m².    Wt Readings from Last 3 Encounters:   10/18/22 81.6 kg (180 lb)   10/18/22 82 kg (180 lb 12.8 oz)   09/06/22  80.7 kg (178 lb)      ---------------------------------------------------------------------------------------------------------------------   Physical Exam:  Constitutional: Older female, nontoxic, speaks in full sentences, well-developed and well-nourished.  No respiratory distress.      HENT:  Head: Normocephalic and atraumatic.  Mouth:  Moist mucous membranes.    Eyes:  Conjunctivae and EOM are normal.  No scleral icterus.  Neck:  Neck supple.  No JVD present.    Cardiovascular:  Normal rate, regular rhythm and normal heart sounds with no murmur.  Pulmonary/Chest:  No respiratory distress, no wheezes, no crackles, with normal breath sounds and good air movement.  Abdominal:  Soft.  Bowel sounds are normal.  No distension and no tenderness.   Musculoskeletal:  No tenderness, and no deformity.  No red or swollen joints anywhere.    Neurological:  Alert and oriented to person, place, and time.  No cranial nerve deficit.  No tongue deviation.  No facial droop.  No slurred speech.   Skin:  Skin is warm and dry.  No rash noted.  No pallor.   Peripheral vascular:  No edema and pulses on all 4 extremities.    ---------------------------------------------------------------------------------------------------------------------  EKG: Sinus bradycardia, rate 48, QTc 366, no acute ST or T wave changes    ECG 12 Lead   Preliminary Result   Test Reason : chest pain   Blood Pressure :   */*   mmHG   Vent. Rate :  48 BPM     Atrial Rate :  48 BPM      P-R Int : 144 ms          QRS Dur :  96 ms       QT Int : 410 ms       P-R-T Axes :  66  71  61 degrees      QTc Int : 366 ms      Sinus bradycardia   Nonspecific ST abnormality   Abnormal ECG   When compared with ECG of 26-AUG-2022 09:21, (Unconfirmed)   Vent. rate has decreased BY  39 BPM   T wave inversion no longer evident in Inferior leads   T wave inversion no longer evident in Lateral leads   QT has shortened      Referred By: FORD           Confirmed By:           Telemetry:  Reviewed    I have personally looked at both the EKG and the telemetry strips.    Last echocardiogram:  Results for orders placed in visit on 11/11/13    SCANNED - ECHOCARDIOGRAM    --------------------------------------------------------------------------------------------------------------------  Labs:  Results from last 7 days   Lab Units 10/18/22  1105   WBC 10*3/mm3 7.19   HEMOGLOBIN g/dL 13.9   HEMATOCRIT % 42.5   MCV fL 91.0   MCHC g/dL 32.7   PLATELETS 10*3/mm3 302         Results from last 7 days   Lab Units 10/18/22  1105   SODIUM mmol/L 142   POTASSIUM mmol/L 4.3   CHLORIDE mmol/L 104   CO2 mmol/L 29.3*   BUN mg/dL 8   CREATININE mg/dL 0.86   CALCIUM mg/dL 9.5   GLUCOSE mg/dL 110*   ALBUMIN g/dL 4.19   BILIRUBIN mg/dL 0.3   ALK PHOS U/L 92   AST (SGOT) U/L 12   ALT (SGPT) U/L 14   Estimated Creatinine Clearance: 67.9 mL/min (by C-G formula based on SCr of 0.86 mg/dL).    No results found for: AMMONIA  Results from last 7 days   Lab Units 10/18/22  1300 10/18/22  1105   TROPONIN T ng/mL <0.010 <0.010   PROBNP pg/mL  --  109.1         No results found for: HGBA1C, POCGLU  No results found for: TSH, FREET4  No results found for: PREGTESTUR, PREGSERUM, HCG, HCGQUANT  Pain Management Panel    There is no flowsheet data to display.       Brief Urine Lab Results     None        No results found for: BLOODCX  No results found for: URINECX  No results found for: WOUNDCX  No results found for: STOOLCX    I have personally looked at the labs and they are summarized above.  ----------------------------------------------------------------------------------------------------------------------  Detailed radiology reports for the last 24 hours:    Imaging Results (Last 24 Hours)     Procedure Component Value Units Date/Time    XR Chest 1 View [[id number]] Collected: 10/18/22 1359     Updated: 10/18/22 1407    Narrative:      EXAM:    XR Chest, 1 View     EXAM DATE:    10/18/2022 12:42 PM     CLINICAL HISTORY:    chest  pain     TECHNIQUE:    Frontal view of the chest.     COMPARISON:    No relevant prior studies available.     FINDINGS:    Lungs:  Unremarkable.  No consolidation.    Pleural space:  Unremarkable.  No pneumothorax.    Heart:  Unremarkable.  No cardiomegaly.    Mediastinum:  Unremarkable.    Bones/joints:  Unremarkable.       Impression:        Unremarkable exam. No acute cardiopulmonary findings identified.     This report was finalized on 10/18/2022 1:59 PM by Dr. Troy Kay MD.           Final impressions for the last 30 days of radiology reports:    XR Chest 1 View    Result Date: 10/18/2022    Unremarkable exam. No acute cardiopulmonary findings identified.  This report was finalized on 10/18/2022 1:59 PM by Dr. Troy Kay MD.      I have personally looked at the radiology images and read the final radiology report.    Assessment & Plan      Patient is a 67-year-old female with history significant for hypertension, hyperlipidemia and tobacco abuse who presented to the ER from outpatient cardiology for chest pressure and presyncopal episodes.     #Unstable angina  #Presyncope, concern for arrhythmogenic etiology  #Nonsustained ventricular tachycardia  #Paroxysmal atrial fibrillation, new  #Essential hypertension  #Tobacco abuse  #Osteoporosis    --Patient sent over from outpatient cardiology office for further ischemic work-up for chest pain and palpitations with recent 25 beat run of V. tach at 200 bpm noted on Holter.  I did discuss with Dr. Arceo, although she did have a negative stress in August, she did have 40 to 50% stenosis on left heart cath in 2014 and her symptoms have been refractory to medical management to date.  Given cardiac arrhythmias with presyncopal episodes and now chest tightness, cards recommended evaluation by interventional cardiology for left heart cath.  --Recent A. fib noted on Holter monitor, placed on Eliquis-last dose 10/15 p.m.  --EKG sinus bradycardia,  nonischemic  --troponins neg x2  --A1c/TSH/lipid panel ordered  --most recent [facility] in 2014 noted 50% stenosis of Lcx and 40-50% LAD  --august 2022-ischemic stress test low risk   --echocardiogram ordered to evaluate wall motion, systolic and diastolic function  --Check magnesium  --continue daily aspirin, increase to high intensity statin, decrease metoprolol tartrate to 12.5 mg twice daily  --given high concern of coronary disease and ventricular tachycardia, will therapeutically anticoagulate with heparin gtt.  --Interventional cardiology consulted for potential left heart catheterization, appreciate recommendations  --Admit to telemetry, n.p.o. at midnight with tentative plans for left heart catheterization in a.m.    Checklist:  Antibiotics: none  Steroids: none  DVT ppx: Heparin GTT  GI ppx: ppi  Diet: consistent carb  Code: CPR, full  Risk/dispo: Patient is high risk d/t unstable angina requiring therapuetic heparin gtt and interventional consultation. Anticipate 48-72hr stay pending course. Dispo likely home when stable.    Denzel Bernstein DO  [facility]  10/18/22  16:16 EDT

## 2022-10-18 NOTE — PROGRESS NOTES
Lawrence Faulkner DO  Ivis Vela  1955  10/18/2022    Patient Active Problem List   Diagnosis   • Hypertension   • Asthma   • Osteoporosis   • Left arm pain   • Closed nondisplaced fracture of head of radius with routine healing   • Palpitations   • Dizziness and giddiness       Dear Lawrence Faulkner DO:    Subjective     Chief Complaint   Patient presents with   • Results     Discuss heart monitor results           History of Present Illness:    Ivis Vela is a 67 y.o. female with a past medical history of hypertension and non-obstructive ASCVD with recent chest pains. She presents today for cardiology follow up. She has been having palpitations. She was evaluated further with a 30 day event monitor. This revealed predominately sinus rhythm with a heart rate ranging from 41 to 110 bpm. There were frequent PAC's and PVC's with short runs of a-fib, ventricular bigeminy, and a 5 beat and a 25 beat run of ventricular tachycardia at a rate of 200 bpm. There were also episodes of sinus bradycardia in the 40's around 3:40 a.m. and 6 a.m. She denies any near syncope or syncope. She did have a lexiscan stress test revealing no evidence of ischemia in August of 2022. She was prescribed Eliquis and has been tolerating well with no bleeding issues. Although, she reports she did not take Eliquis yesterday or today. She continues to have frequent chest pressure in the substernal region. This usually occurs after exerting herself. She has a history of hypertension and tobacco abuse. Her last left heart catheterization was in 2014 and did reveal 50% stenosis in the left circumflex and 40-50% in the LAD.          No Known Allergies:      Current Outpatient Medications:   •  alendronate (FOSAMAX) 70 MG tablet, Take 70 mg by mouth Every 7 (Seven) Days., Disp: , Rfl:   •  amitriptyline (ELAVIL) 25 MG tablet, , Disp: , Rfl:   •  aspirin 325 MG tablet, Take 81 mg by mouth Daily., Disp: , Rfl:   •  atorvastatin (LIPITOR) 20 MG  "tablet, Take 20 mg by mouth Daily., Disp: , Rfl:   •  isosorbide mononitrate (IMDUR) 60 MG 24 hr tablet, Take 1 tablet by mouth Daily., Disp: 30 tablet, Rfl: 5  •  losartan-hydrochlorothiazide (HYZAAR) 50-12.5 MG per tablet, , Disp: , Rfl:   •  metoprolol tartrate (LOPRESSOR) 50 MG tablet, Take 0.5 tablets by mouth 2 (Two) Times a Day., Disp: 180 tablet, Rfl: 1  •  nitroglycerin (NITROSTAT) 0.4 MG SL tablet, 1 under the tongue as needed for angina, may repeat q5mins for up three doses, Disp: 25 tablet, Rfl: 2  •  pantoprazole (PROTONIX) 40 MG EC tablet, Take 40 mg by mouth Daily., Disp: , Rfl:       The following portions of the patient's history were reviewed and updated as appropriate: allergies, current medications, past family history, past medical history, past social history, past surgical history and problem list.    Social History     Tobacco Use   • Smoking status: Former     Types: Electronic Cigarette, Cigarettes     Quit date:      Years since quittin.8   • Smokeless tobacco: Never   Substance Use Topics   • Alcohol use: No   • Drug use: No       Review of Systems   Constitutional: Negative for decreased appetite and malaise/fatigue.   Cardiovascular: Positive for chest pain and palpitations. Negative for dyspnea on exertion.   Respiratory: Negative for cough and shortness of breath.        Objective   Vitals:    10/18/22 0824   BP: 149/83   Pulse: 70   Weight: 82 kg (180 lb 12.8 oz)   Height: 165.1 cm (65\")     Body mass index is 30.09 kg/m².        Vitals reviewed.   Constitutional:       Appearance: Healthy appearance. Well-developed and not in distress.   HENT:      Head: Normocephalic and atraumatic.   Neck:      Vascular: No JVD.   Pulmonary:      Effort: Pulmonary effort is normal.      Breath sounds: Normal breath sounds. No wheezing. No rales.   Cardiovascular:      Normal rate. Regular rhythm.      Murmurs: There is no murmur.      . No S3 and S4 gallop.   Edema:     Peripheral edema " absent.   Abdominal:      General: Bowel sounds are normal.      Palpations: Abdomen is soft.   Skin:     General: Skin is warm and dry.   Neurological:      Mental Status: Alert, oriented to person, place, and time and oriented to person, place and time.   Psychiatric:         Mood and Affect: Mood normal.         Behavior: Behavior normal.         Lab Results   Component Value Date     07/24/2020    K 3.7 07/24/2020     07/24/2020    CO2 27.7 07/24/2020    BUN 8 07/24/2020    CREATININE 0.90 08/19/2021    GLUCOSE 120 (H) 07/24/2020    CALCIUM 9.9 07/24/2020    AST 14 07/24/2020    ALT 22 07/24/2020    ALKPHOS 100 07/24/2020       Lab Results   Component Value Date    WBC 9.41 09/21/2022    HGB 14.1 09/21/2022    HCT 42.4 09/21/2022     09/21/2022             ECG 12 Lead    Date/Time: 10/18/2022 10:47 AM  Performed by: Baylee Chaudhary APRN  Authorized by: Baylee Chaudhary APRN   Comparison: compared with previous ECG   Rhythm: sinus rhythm and sinus arrhythmia  BPM: 62  ST Depression: V3, V4, V5, V6, II and aVF                Assessment & Plan    Diagnosis Plan   1. ASCVD (arteriosclerotic cardiovascular disease)  ECG 12 Lead    ECG 12 Lead      2. Precordial pain  ECG 12 Lead    ECG 12 Lead      3. Paroxysmal atrial fibrillation (HCC)  ECG 12 Lead    ECG 12 Lead      4. Dizziness  ECG 12 Lead    ECG 12 Lead      5. Ventricular tachycardia  ECG 12 Lead    ECG 12 Lead                   Recommendations:    1. The patient has had persistent chest pressure concerning for angina despite medical management and normal stress test. She also had recent 25 beat run of ventricular tachycardia. She reports she has had chest pain this morning. Will refer to [facility] ED for further evaluation, possible left heart catheterization in the a.m. since the patient has already been holding Eliquis. Report called to RN at [facility] ED.  2. Follow up pending.      Plan of care discussed with Dr. Arceo          No  follow-ups on file.    As always, I appreciate very much the opportunity to participate in the cardiovascular care of your patients.      With Best Regards,    Baylee Chaudhary APRN

## 2022-10-18 NOTE — CONSULTS
[facility]          Provider, No Known  [facility] [place] [address]     Thank you for asking me to see Ivis Vela chest pain    History of Present Illness  This is a 67 y.o. female with past medical history hypertension hyperlipidemia tobacco abuse with known CAD on previous catheterization in 2014 being admitted for invasive evaluation by her primary cardiologist patient reports progressive worsening episodes of discomfort radiating to the shoulder left side and jaw at times with activity and at times at rest both of them seem to be limiting activities of daily living and worsening over the last few months those are somewhat different than her paroxysmal fibrillation which is palpitations lightheadedness and dizziness she did undergo noninvasive testing:  Which was normal however is failing antianginal medications and continues to be plagued with concern for false negativity has been admitted by primary cardiologist for invasive evaluation      Review of Systems - ROS  Constitution: Negative for weakness, weight gain and weight loss.   HENT: Negative for congestion.    Eyes: Negative for blurred vision.   Cardiovascular: As mentioned above  Respiratory: Negative for cough and hemoptysis.    Endocrine: Negative for polydipsia and polyuria.   Hematologic/Lymphatic: Negative for bleeding problem. Does not bruise/bleed easily.   Skin: Negative for flushing.   Musculoskeletal: Negative for neck pain and stiffness.   Gastrointestinal: Negative for abdominal pain, diarrhea, jaundice, melena, nausea and vomiting.   Genitourinary: Negative for dysuria and hematuria.   Neurological: Negative for dizziness, focal weakness and numbness.   Psychiatric/Behavioral: Negative for altered mental status and depression.          All other systems were reviewed and were negative.    family history includes Breast cancer in her maternal cousin and paternal cousin; Diabetes in her brother; Heart  disease in her brother and father; Hypertension in her brother, father, mother, and sister.     reports that she quit smoking about 8 years ago. Her smoking use included electronic cigarette and cigarettes. She has never used smokeless tobacco. She reports that she does not drink alcohol and does not use drugs.    No Known Allergies      Current Facility-Administered Medications:   •  acetaminophen (TYLENOL) tablet 650 mg, 650 mg, Oral, Q4H PRN, Denzel Bernstein,   •  [START ON 10/19/2022] aspirin EC tablet 81 mg, 81 mg, Oral, Daily, Denzel Bernstein, DO  •  atorvastatin (LIPITOR) tablet 40 mg, 40 mg, Oral, Nightly, Denzel Bernstein, DO  •  sennosides-docusate (PERICOLACE) 8.6-50 MG per tablet 2 tablet, 2 tablet, Oral, BID **AND** polyethylene glycol (MIRALAX) packet 17 g, 17 g, Oral, Daily PRN **AND** bisacodyl (DULCOLAX) EC tablet 5 mg, 5 mg, Oral, Daily PRN **AND** bisacodyl (DULCOLAX) suppository 10 mg, 10 mg, Rectal, Daily PRN, Denzel Bernstein, DO  •  heparin (porcine) 5000 UNIT/ML injection 2,400 Units, 30 Units/kg, Intravenous, PRN, Denzel Bernstein, DO  •  heparin (porcine) 5000 UNIT/ML injection 4,900 Units, 60 Units/kg, Intravenous, Once, Denzel Bernstein, DO  •  heparin (porcine) 5000 UNIT/ML injection 4,900 Units, 60 Units/kg, Intravenous, PRN, Denzel Bernstein,   •  heparin 17848 units/250 mL (100 units/mL) in 0.45 % NaCl infusion, 12 Units/kg/hr, Intravenous, Titrated, Denzel Bernstein DO  •  [START ON 10/19/2022] isosorbide mononitrate (IMDUR) 24 hr tablet 60 mg, 60 mg, Oral, Daily, Denzel Bernstein,   •  [START ON 10/19/2022] losartan (COZAAR) tablet 50 mg, 50 mg, Oral, Q24H, Denzel Bernstein, DO  •  metoprolol tartrate (LOPRESSOR) tablet 12.5 mg, 12.5 mg, Oral, Q12H, Denzel Bernstein, DO  •  nitroglycerin (NITROSTAT) SL tablet 0.4 mg, 0.4 mg, Sublingual, Q5 Min PRN, Denzel Bernstein, DO  •   "ondansetron (ZOFRAN) injection 4 mg, 4 mg, Intravenous, Q6H PRN, Denzel Bernstein,   •  pantoprazole (PROTONIX) EC tablet 40 mg, 40 mg, Oral, Daily, Denzel Bernstein DO  •  sodium chloride 0.9 % flush 10 mL, 10 mL, Intravenous, Q12H, Denzel Bernstein DO  •  sodium chloride 0.9 % flush 10 mL, 10 mL, Intravenous, PRN, Denzel Bernstein DO    Physical Exam:  Vitals:    10/18/22 1601 10/18/22 1635 10/18/22 1640 10/18/22 1701   BP: 137/77   160/88   BP Location:       Patient Position:       Pulse: 59 65 54 65   Resp:       Temp:   98.6 °F (37 °C)    TempSrc:   Oral    SpO2: 99% 100% 100% 99%   Weight:  81.6 kg (179 lb 14.3 oz)     Height:  167.6 cm (66\")       Current Pain Level: None  Pulse Ox: Normal  on room air  General: Alert, appears stated age and cooperative     Body Habitus: Well-nourished    HEENT: Head: Normocephalic, no lesions, without obvious abnormality. No arcus senilis, xanthelasma or xanthomas.    Neuro: Alert, oriented x3  Pulses: 2+ and symmetric  JVP: Volume/Pulsation: Normal.  Normal waveforms.   Appropriate inspiratory decrease.  No Kussmaul's. No Perla's.   Carotid Exam: No bruit normal pulsation bilaterally   Carotid Volume: Normal.     Respirations: No increased work of breathing   Chest:  Normal    Pulmonary: Normal   Precordium: Normal impulses. P2 is not palpable.  RV Heave: Absent  LV Heave: Absent  [place]:  Normal size and placement  Palpable S4: Absent.  Heart rate: Normal    Heart Rhythm: Regular     Heart Sounds: S1: Normal  S2: Normal  S3: Absent   S4: Absent  Opening Snap: Absent    Pericardial Rub:  Absent: .    Abdomen:   Appearance: Normal .  Palpation: Soft, nontender to palpation, bowel sounds positive in all four quadrants; no guarding or rebound tenderness  Extremity: No edema.   LE Skin: No rashes  LE Hair:  Normal  LE Pulses: Well perfused with normal pulses in the distal extremities  Pallor on elevation: Absent. Rubor on dependency: " None      DATA REVIEWED:     EKG. I personally reviewed and interpreted the EKG.  No acute changes    --------------------------------------------------------------------------------------------------  LABS:     The CVD Risk score (Leida et al., 2008) failed to calculate for the following reasons:    The 2008 CVD risk score is only valid for ages 30 to 74    The patient has a prior MI, stroke, CHF, or peripheral vascular disease diagnosis         Lab Results   Component Value Date    GLUCOSE 110 (H) 10/18/2022    BUN 8 10/18/2022    CREATININE 0.86 10/18/2022    EGFRIFNONA 75 07/24/2020    BCR 9.3 10/18/2022    K 4.3 10/18/2022    CO2 29.3 (H) 10/18/2022    CALCIUM 9.5 10/18/2022    ALBUMIN 4.19 10/18/2022    AST 12 10/18/2022    ALT 14 10/18/2022     Lab Results   Component Value Date    WBC 7.19 10/18/2022    HGB 13.9 10/18/2022    HCT 42.5 10/18/2022    MCV 91.0 10/18/2022     10/18/2022     No results found for: CHOL, CHLPL, TRIG, HDL, LDL, LDLDIRECT  No results found for: TSH, G9WIVLN, W9VIDFQ, THYROIDAB  Lab Results   Component Value Date    TROPONINT <0.010 10/18/2022     No results found for: HGBA1C  No results found for: DDIMER  Lab Results   Component Value Date    ALT 14 10/18/2022     No results found for: HGBA1C  Lab Results   Component Value Date    CREATININE 0.86 10/18/2022     No results found for: IRON, TIBC, FERRITIN  No results found for: INR, PROTIME    Assessment & Plan   Progressive angina failing medical management we will proceed with invasive evaluation risk-benefit alternatives discussed with patient in length  Paroxysmal atrial fibrillation  Hypertension  Hyperlipidemia  Tobacco abuse    Thank you for allowing me to participate in this patient's care    This document has been electronically signed by Piotr Edwards DO on October 18, 2022 17:08 EDT

## 2022-10-18 NOTE — PLAN OF CARE
Problem: Heart Failure Comorbidity  Goal: Maintenance of Heart Failure Symptom Control  Outcome: Ongoing, Progressing  Intervention: Maintain Heart Failure-Management  Recent Flowsheet Documentation  Taken 10/18/2022 1700 by Tatiana Pinedo, RN  Medication Review/Management: medications reviewed   Goal Outcome Evaluation:  Plan of Care Reviewed With: patient        Progress: no change  Outcome Evaluation: RA. A/Ox4. Pt denies any pain or discomfort at this time. Sitting on the side of the bed visiting with family. Heparin gtt initiated. Heart cath planned for tomorrow. Call light within reach.

## 2022-10-19 ENCOUNTER — APPOINTMENT (OUTPATIENT)
Dept: CARDIOLOGY | Facility: HOSPITAL | Age: 67
End: 2022-10-19

## 2022-10-19 VITALS
SYSTOLIC BLOOD PRESSURE: 137 MMHG | BODY MASS INDEX: 28.88 KG/M2 | OXYGEN SATURATION: 97 % | RESPIRATION RATE: 18 BRPM | HEIGHT: 66 IN | TEMPERATURE: 97.8 F | WEIGHT: 179.68 LBS | DIASTOLIC BLOOD PRESSURE: 84 MMHG | HEART RATE: 68 BPM

## 2022-10-19 LAB
ANION GAP SERPL CALCULATED.3IONS-SCNC: 10.8 MMOL/L (ref 5–15)
ANION GAP SERPL CALCULATED.3IONS-SCNC: 10.9 MMOL/L (ref 5–15)
APTT PPP: 36 SECONDS (ref 26.5–34.5)
APTT PPP: 59.1 SECONDS (ref 26.5–34.5)
APTT PPP: 93.3 SECONDS (ref 26.5–34.5)
BASOPHILS # BLD AUTO: 0.04 10*3/MM3 (ref 0–0.2)
BASOPHILS NFR BLD AUTO: 0.6 % (ref 0–1.5)
BH CV ECHO MEAS - ACS: 2.2 CM
BH CV ECHO MEAS - AO MAX PG: 9.6 MMHG
BH CV ECHO MEAS - AO MEAN PG: 4 MMHG
BH CV ECHO MEAS - AO ROOT DIAM: 3.3 CM
BH CV ECHO MEAS - AO V2 MAX: 155 CM/SEC
BH CV ECHO MEAS - AO V2 VTI: 27.2 CM
BH CV ECHO MEAS - EDV(CUBED): 69.7 ML
BH CV ECHO MEAS - EDV(MOD-SP4): 62 ML
BH CV ECHO MEAS - EF(MOD-SP4): 65.6 %
BH CV ECHO MEAS - ESV(CUBED): 19.9 ML
BH CV ECHO MEAS - ESV(MOD-SP4): 21.3 ML
BH CV ECHO MEAS - FS: 34.1 %
BH CV ECHO MEAS - IVS/LVPW: 1.11 CM
BH CV ECHO MEAS - IVSD: 1.28 CM
BH CV ECHO MEAS - LA DIMENSION: 2.6 CM
BH CV ECHO MEAS - LAT PEAK E' VEL: 6.5 CM/SEC
BH CV ECHO MEAS - LV DIASTOLIC VOL/BSA (35-75): 32.5 CM2
BH CV ECHO MEAS - LV MASS(C)D: 175.4 GRAMS
BH CV ECHO MEAS - LV SYSTOLIC VOL/BSA (12-30): 11.2 CM2
BH CV ECHO MEAS - LVIDD: 4.1 CM
BH CV ECHO MEAS - LVIDS: 2.7 CM
BH CV ECHO MEAS - LVOT AREA: 3.8 CM2
BH CV ECHO MEAS - LVOT DIAM: 2.2 CM
BH CV ECHO MEAS - LVPWD: 1.15 CM
BH CV ECHO MEAS - MED PEAK E' VEL: 6 CM/SEC
BH CV ECHO MEAS - MV A MAX VEL: 82.3 CM/SEC
BH CV ECHO MEAS - MV E MAX VEL: 67.7 CM/SEC
BH CV ECHO MEAS - MV E/A: 0.82
BH CV ECHO MEAS - PA ACC TIME: 0.11 SEC
BH CV ECHO MEAS - PA PR(ACCEL): 30 MMHG
BH CV ECHO MEAS - SI(MOD-SP4): 21.3 ML/M2
BH CV ECHO MEAS - SV(MOD-SP4): 40.7 ML
BH CV ECHO MEASUREMENTS AVERAGE E/E' RATIO: 10.83
BUN SERPL-MCNC: 8 MG/DL (ref 8–23)
BUN SERPL-MCNC: 9 MG/DL (ref 8–23)
BUN/CREAT SERPL: 10 (ref 7–25)
BUN/CREAT SERPL: 10.3 (ref 7–25)
CALCIUM SPEC-SCNC: 8.9 MG/DL (ref 8.6–10.5)
CALCIUM SPEC-SCNC: 9.3 MG/DL (ref 8.6–10.5)
CHLORIDE SERPL-SCNC: 104 MMOL/L (ref 98–107)
CHLORIDE SERPL-SCNC: 105 MMOL/L (ref 98–107)
CO2 SERPL-SCNC: 23.2 MMOL/L (ref 22–29)
CO2 SERPL-SCNC: 24.1 MMOL/L (ref 22–29)
CREAT SERPL-MCNC: 0.8 MG/DL (ref 0.57–1)
CREAT SERPL-MCNC: 0.87 MG/DL (ref 0.57–1)
DEPRECATED RDW RBC AUTO: 45.2 FL (ref 37–54)
EGFRCR SERPLBLD CKD-EPI 2021: 73.6 ML/MIN/1.73
EGFRCR SERPLBLD CKD-EPI 2021: 81.4 ML/MIN/1.73
EOSINOPHIL # BLD AUTO: 0.06 10*3/MM3 (ref 0–0.4)
EOSINOPHIL NFR BLD AUTO: 1 % (ref 0.3–6.2)
ERYTHROCYTE [DISTWIDTH] IN BLOOD BY AUTOMATED COUNT: 13.2 % (ref 12.3–15.4)
GLUCOSE SERPL-MCNC: 102 MG/DL (ref 65–99)
GLUCOSE SERPL-MCNC: 106 MG/DL (ref 65–99)
HCT VFR BLD AUTO: 41 % (ref 34–46.6)
HGB BLD-MCNC: 13.2 G/DL (ref 12–15.9)
IMM GRANULOCYTES # BLD AUTO: 0.01 10*3/MM3 (ref 0–0.05)
IMM GRANULOCYTES NFR BLD AUTO: 0.2 % (ref 0–0.5)
INR PPP: 1.05 (ref 0.9–1.1)
LEFT ATRIUM VOLUME INDEX: 8.4 ML/M2
LYMPHOCYTES # BLD AUTO: 2.04 10*3/MM3 (ref 0.7–3.1)
LYMPHOCYTES NFR BLD AUTO: 32.3 % (ref 19.6–45.3)
MAGNESIUM SERPL-MCNC: 2.3 MG/DL (ref 1.6–2.4)
MAXIMAL PREDICTED HEART RATE: 154 BPM
MCH RBC QN AUTO: 29.9 PG (ref 26.6–33)
MCHC RBC AUTO-ENTMCNC: 32.2 G/DL (ref 31.5–35.7)
MCV RBC AUTO: 92.8 FL (ref 79–97)
MONOCYTES # BLD AUTO: 0.44 10*3/MM3 (ref 0.1–0.9)
MONOCYTES NFR BLD AUTO: 7 % (ref 5–12)
NEUTROPHILS NFR BLD AUTO: 3.72 10*3/MM3 (ref 1.7–7)
NEUTROPHILS NFR BLD AUTO: 58.9 % (ref 42.7–76)
NRBC BLD AUTO-RTO: 0 /100 WBC (ref 0–0.2)
PLATELET # BLD AUTO: 263 10*3/MM3 (ref 140–450)
PMV BLD AUTO: 10.3 FL (ref 6–12)
POTASSIUM SERPL-SCNC: 3.5 MMOL/L (ref 3.5–5.2)
POTASSIUM SERPL-SCNC: 3.6 MMOL/L (ref 3.5–5.2)
PROTHROMBIN TIME: 13.9 SECONDS (ref 12.1–14.7)
RBC # BLD AUTO: 4.42 10*6/MM3 (ref 3.77–5.28)
SODIUM SERPL-SCNC: 138 MMOL/L (ref 136–145)
SODIUM SERPL-SCNC: 140 MMOL/L (ref 136–145)
STRESS TARGET HR: 131 BPM
WBC NRBC COR # BLD: 6.31 10*3/MM3 (ref 3.4–10.8)

## 2022-10-19 PROCEDURE — 85610 PROTHROMBIN TIME: CPT | Performed by: INTERNAL MEDICINE

## 2022-10-19 PROCEDURE — 93458 L HRT ARTERY/VENTRICLE ANGIO: CPT | Performed by: INTERNAL MEDICINE

## 2022-10-19 PROCEDURE — B2111ZZ FLUOROSCOPY OF MULTIPLE CORONARY ARTERIES USING LOW OSMOLAR CONTRAST: ICD-10-PCS | Performed by: INTERNAL MEDICINE

## 2022-10-19 PROCEDURE — 80048 BASIC METABOLIC PNL TOTAL CA: CPT | Performed by: INTERNAL MEDICINE

## 2022-10-19 PROCEDURE — C1769 GUIDE WIRE: HCPCS | Performed by: INTERNAL MEDICINE

## 2022-10-19 PROCEDURE — 85730 THROMBOPLASTIN TIME PARTIAL: CPT | Performed by: STUDENT IN AN ORGANIZED HEALTH CARE EDUCATION/TRAINING PROGRAM

## 2022-10-19 PROCEDURE — 4A023N7 MEASUREMENT OF CARDIAC SAMPLING AND PRESSURE, LEFT HEART, PERCUTANEOUS APPROACH: ICD-10-PCS | Performed by: INTERNAL MEDICINE

## 2022-10-19 PROCEDURE — 93306 TTE W/DOPPLER COMPLETE: CPT | Performed by: INTERNAL MEDICINE

## 2022-10-19 PROCEDURE — C1894 INTRO/SHEATH, NON-LASER: HCPCS | Performed by: INTERNAL MEDICINE

## 2022-10-19 PROCEDURE — 25010000002 FENTANYL CITRATE (PF) 50 MCG/ML SOLUTION: Performed by: INTERNAL MEDICINE

## 2022-10-19 PROCEDURE — 83735 ASSAY OF MAGNESIUM: CPT | Performed by: STUDENT IN AN ORGANIZED HEALTH CARE EDUCATION/TRAINING PROGRAM

## 2022-10-19 PROCEDURE — 99232 SBSQ HOSP IP/OBS MODERATE 35: CPT | Performed by: INTERNAL MEDICINE

## 2022-10-19 PROCEDURE — 99239 HOSP IP/OBS DSCHRG MGMT >30: CPT | Performed by: STUDENT IN AN ORGANIZED HEALTH CARE EDUCATION/TRAINING PROGRAM

## 2022-10-19 PROCEDURE — 93306 TTE W/DOPPLER COMPLETE: CPT

## 2022-10-19 PROCEDURE — 0 IOPAMIDOL PER 1 ML: Performed by: INTERNAL MEDICINE

## 2022-10-19 PROCEDURE — 80048 BASIC METABOLIC PNL TOTAL CA: CPT | Performed by: STUDENT IN AN ORGANIZED HEALTH CARE EDUCATION/TRAINING PROGRAM

## 2022-10-19 PROCEDURE — 25010000002 MIDAZOLAM PER 1 MG: Performed by: INTERNAL MEDICINE

## 2022-10-19 PROCEDURE — 85730 THROMBOPLASTIN TIME PARTIAL: CPT | Performed by: INTERNAL MEDICINE

## 2022-10-19 PROCEDURE — 85025 COMPLETE CBC W/AUTO DIFF WBC: CPT | Performed by: INTERNAL MEDICINE

## 2022-10-19 RX ORDER — SODIUM CHLORIDE 9 MG/ML
75 INJECTION, SOLUTION INTRAVENOUS CONTINUOUS
Status: DISCONTINUED | OUTPATIENT
Start: 2022-10-19 | End: 2022-10-19 | Stop reason: HOSPADM

## 2022-10-19 RX ORDER — SODIUM CHLORIDE 0.9 % (FLUSH) 0.9 %
3 SYRINGE (ML) INJECTION EVERY 12 HOURS SCHEDULED
Status: DISCONTINUED | OUTPATIENT
Start: 2022-10-19 | End: 2022-10-19 | Stop reason: HOSPADM

## 2022-10-19 RX ORDER — ENOXAPARIN SODIUM 100 MG/ML
40 INJECTION SUBCUTANEOUS NIGHTLY
Status: DISCONTINUED | OUTPATIENT
Start: 2022-10-19 | End: 2022-10-19 | Stop reason: HOSPADM

## 2022-10-19 RX ORDER — RANOLAZINE 500 MG/1
500 TABLET, EXTENDED RELEASE ORAL EVERY 12 HOURS SCHEDULED
Status: DISCONTINUED | OUTPATIENT
Start: 2022-10-19 | End: 2022-10-19 | Stop reason: HOSPADM

## 2022-10-19 RX ORDER — ATORVASTATIN CALCIUM 80 MG/1
80 TABLET, FILM COATED ORAL NIGHTLY
Qty: 30 TABLET | Refills: 0 | Status: SHIPPED | OUTPATIENT
Start: 2022-10-19 | End: 2022-11-18

## 2022-10-19 RX ORDER — ATORVASTATIN CALCIUM 40 MG/1
80 TABLET, FILM COATED ORAL NIGHTLY
Status: DISCONTINUED | OUTPATIENT
Start: 2022-10-19 | End: 2022-10-19 | Stop reason: HOSPADM

## 2022-10-19 RX ORDER — LIDOCAINE HYDROCHLORIDE 20 MG/ML
INJECTION, SOLUTION INFILTRATION; PERINEURAL
Status: DISCONTINUED | OUTPATIENT
Start: 2022-10-19 | End: 2022-10-19 | Stop reason: HOSPADM

## 2022-10-19 RX ORDER — SODIUM CHLORIDE 9 MG/ML
INJECTION, SOLUTION INTRAVENOUS
Status: COMPLETED | OUTPATIENT
Start: 2022-10-19 | End: 2022-10-19

## 2022-10-19 RX ORDER — MIDAZOLAM HYDROCHLORIDE 1 MG/ML
INJECTION INTRAMUSCULAR; INTRAVENOUS
Status: DISCONTINUED | OUTPATIENT
Start: 2022-10-19 | End: 2022-10-19 | Stop reason: HOSPADM

## 2022-10-19 RX ORDER — SODIUM CHLORIDE 0.9 % (FLUSH) 0.9 %
10 SYRINGE (ML) INJECTION AS NEEDED
Status: DISCONTINUED | OUTPATIENT
Start: 2022-10-19 | End: 2022-10-19 | Stop reason: HOSPADM

## 2022-10-19 RX ORDER — FENTANYL CITRATE 50 UG/ML
INJECTION, SOLUTION INTRAMUSCULAR; INTRAVENOUS
Status: DISCONTINUED | OUTPATIENT
Start: 2022-10-19 | End: 2022-10-19 | Stop reason: HOSPADM

## 2022-10-19 RX ORDER — VERAPAMIL HYDROCHLORIDE 2.5 MG/ML
INJECTION, SOLUTION INTRAVENOUS
Status: DISCONTINUED | OUTPATIENT
Start: 2022-10-19 | End: 2022-10-19 | Stop reason: HOSPADM

## 2022-10-19 RX ORDER — SODIUM CHLORIDE 9 MG/ML
100 INJECTION, SOLUTION INTRAVENOUS CONTINUOUS
Status: DISCONTINUED | OUTPATIENT
Start: 2022-10-19 | End: 2022-10-19 | Stop reason: HOSPADM

## 2022-10-19 RX ORDER — ACETAMINOPHEN 325 MG/1
650 TABLET ORAL EVERY 4 HOURS PRN
Status: DISCONTINUED | OUTPATIENT
Start: 2022-10-19 | End: 2022-10-19 | Stop reason: HOSPADM

## 2022-10-19 RX ORDER — LOSARTAN POTASSIUM 50 MG/1
100 TABLET ORAL
Status: DISCONTINUED | OUTPATIENT
Start: 2022-10-19 | End: 2022-10-19 | Stop reason: HOSPADM

## 2022-10-19 RX ORDER — RANOLAZINE 500 MG/1
500 TABLET, EXTENDED RELEASE ORAL EVERY 12 HOURS SCHEDULED
Qty: 60 TABLET | Refills: 0 | Status: SHIPPED | OUTPATIENT
Start: 2022-10-19 | End: 2022-10-31

## 2022-10-19 RX ADMIN — SODIUM CHLORIDE 100 ML/HR: 9 INJECTION, SOLUTION INTRAVENOUS at 13:36

## 2022-10-19 RX ADMIN — ISOSORBIDE MONONITRATE 60 MG: 60 TABLET, EXTENDED RELEASE ORAL at 08:36

## 2022-10-19 RX ADMIN — PANTOPRAZOLE SODIUM 40 MG: 40 TABLET, DELAYED RELEASE ORAL at 08:37

## 2022-10-19 RX ADMIN — LOSARTAN POTASSIUM 100 MG: 50 TABLET, FILM COATED ORAL at 08:36

## 2022-10-19 RX ADMIN — METOPROLOL TARTRATE 12.5 MG: 25 TABLET, FILM COATED ORAL at 08:36

## 2022-10-19 RX ADMIN — DOCUSATE SODIUM 50 MG AND SENNOSIDES 8.6 MG 2 TABLET: 8.6; 5 TABLET, FILM COATED ORAL at 08:37

## 2022-10-19 RX ADMIN — Medication 10 ML: at 08:36

## 2022-10-19 RX ADMIN — ASPIRIN 81 MG: 81 TABLET, COATED ORAL at 08:36

## 2022-10-19 NOTE — PLAN OF CARE
Goal Outcome Evaluation:           Progress: improving  Outcome Evaluation: Patient vital signs currently stable and she remains on room air and tolerating it well. She is alert and oriented x4. Continuous heparin gtt currently infusing. She has denied any complaints during this shift. Otherwise, no significant changes noted.

## 2022-10-19 NOTE — DISCHARGE SUMMARY
[facility] HOSPITALISTS DISCHARGE SUMMARY    Patient Identification:  Name:  Ivis Vela  Age:  67 y.o.  Sex:  female  :  1955  MRN:  [id number]  Visit Number:  [id number]    Date of Admission: 10/18/2022  Date of Discharge:  10/19/2022     PCP: Lawrence Faulkner DO    DISCHARGE DIAGNOSIS  #Unstable angina status post left heart catheterization  #Nonobstructive coronary artery disease  #Presyncope, concern for arrhythmogenic etiology  #Nonsustained ventricular tachycardia  #Paroxysmal atrial fibrillation, new  #Essential hypertension  #Tobacco abuse  #Osteoporosis    CONSULTS   Interventional cardiology    PROCEDURES PERFORMED  [facility] w/ nonobs CAD    HOSPITAL COURSE  Patient is a 67 y.o. female presented to [facility] from outpatient cardiology for chest pressure and presyncopal episodes. Please see the admitting history and physical for further details.  Patient has been following with outpatient cardiology and had persistent chest pain and palpitations despite medical optimization.  EKG was nonischemic, troponins 10x2 and magnesium within normal limits.  She was evaluated by interventional cardiology who agreed and proceeded with left heart catheterization.  Left heart cath noted nonobstructive coronary artery disease and recommended continued medical management.  Atorvastatin was increased to 80 mg at bedtime and after discussing with outpatient cardiology, she was started on Ranexa twice daily.    Throughout the hospitalization she remained asymptomatic and felt at baseline.  Initially she was bradycardic with heart rates in the 40s to high 50s.  Metoprolol tartrate was decreased from 25 twice daily to 12.5 twice daily (heart rate remained in the 60s to 70s).  From a rhythm standpoint, she was between normal sinus and intermittent atrial fibrillation, rate controlled.  She was continued on Eliquis for stroke prophylaxis.  If palpitations/dizziness return, I discussed with her she  may need outpatient electrophysiology evaluation.    On the day of discharge, her post cath radial site was well-healed without hematoma.  Hospital course was discussed with cardiology.  She was made an appointment to see her PCP in 1 week and follow-up with cardiology within 2 weeks.    VITAL SIGNS:  Temp:  [97.8 °F (36.6 °C)-98.6 °F (37 °C)] 97.8 °F (36.6 °C)  Heart Rate:  [52-80] 70  Resp:  [18] 18  BP: (111-168)/(65-95) 119/78  SpO2:  [96 %-100 %] 98 %  on  Flow (L/min):  [2] 2;   Device (Oxygen Therapy): room air    Body mass index is 29 kg/m².  Wt Readings from Last 3 Encounters:   10/19/22 81.5 kg (179 lb 10.8 oz)   10/18/22 82 kg (180 lb 12.8 oz)   09/06/22 80.7 kg (178 lb)       PHYSICAL EXAM:  Constitutional: Older female, nontoxic, speaks in full sentences, well-developed and well-nourished.  No respiratory distress.      HENT:  Head: Normocephalic and atraumatic.  Mouth:  Moist mucous membranes.    Eyes:  Conjunctivae and EOM are normal.  No scleral icterus.  Neck:  Neck supple.  No JVD present.    Cardiovascular:  Normal rate, regular rhythm and normal heart sounds with no murmur.  Pulmonary/Chest:  No respiratory distress, no wheezes, no crackles, with normal breath sounds and good air movement.  Abdominal: Soft.  Bowel sounds are normal.  No distension and no tenderness.   Musculoskeletal: No tenderness, and no deformity.  No red or swollen joints anywhere.    Neurological: Alert and oriented to person, place, and time.  No cranial nerve deficit.  No tongue deviation.  No facial droop. No slurred speech.   Skin: Skin is warm and dry.  No rash noted.  No pallor.   Peripheral vascular: No edema and pulses on all 4 extremities. Post cath right radial site w/out hematoma    DISCHARGE DISPOSITION   Stable    DISCHARGE MEDICATIONS:     Discharge Medications      New Medications      Instructions Start Date   ranolazine 500 MG 12 hr tablet  Commonly known as: RANEXA   500 mg, Oral, Every 12 Hours Scheduled          Changes to Medications      Instructions Start Date   atorvastatin 80 MG tablet  Commonly known as: LIPITOR  What changed:   · medication strength  · how much to take  · when to take this   80 mg, Oral, Nightly      metoprolol tartrate 25 MG tablet  Commonly known as: LOPRESSOR  What changed:   · medication strength  · how much to take   12.5 mg, Oral, 2 Times Daily         Continue These Medications      Instructions Start Date   alendronate 70 MG tablet  Commonly known as: FOSAMAX   70 mg, Oral, Every 7 Days      amitriptyline 25 MG tablet  Commonly known as: ELAVIL   25 mg, Oral, Nightly      apixaban 5 MG tablet tablet  Commonly known as: ELIQUIS   5 mg, Oral, 2 Times Daily      aspirin 81 MG EC tablet   81 mg, Oral, Daily      isosorbide mononitrate 60 MG 24 hr tablet  Commonly known as: IMDUR   60 mg, Oral, Daily      losartan-hydrochlorothiazide 100-12.5 MG per tablet  Commonly known as: HYZAAR   0.5 tablets, Oral, Daily      nitroglycerin 0.4 MG SL tablet  Commonly known as: NITROSTAT   1 under the tongue as needed for angina, may repeat q5mins for up three doses      pantoprazole 40 MG EC tablet  Commonly known as: PROTONIX   40 mg, Oral, Daily               Activity Instructions    Regular; Cardiac         Additional Instructions for the Follow-ups that You Need to Schedule     Discharge Follow-up with PCP   As directed       Currently Documented PCP:    Lawrence Faulkner DO    PCP Phone Number:    [phone]     Follow Up Details: 1wk hospiutal fu         Discharge Follow-up with Specialty: Dr Arceo; 2 Weeks   As directed      Specialty: Dr Arceo    Follow Up: 2 Weeks    Follow Up Details: s/p LHC w/out PCI            Follow-up Information     Lawrenec Faulkner DO .    Specialty: Family Medicine  Why: 1wk hospiutal fu  Contact information:  [address] [place] [address]  [phone]                          TEST  RESULTS PENDING AT DISCHARGE  Pending Labs     Order Current Status     Basic Metabolic Panel In process           CODE STATUS  Code Status and Medical Interventions:   Ordered at: 10/18/22 1453     Level Of Support Discussed With:    Patient     Code Status (Patient has no pulse and is not breathing):    CPR (Attempt to Resuscitate)     Medical Interventions (Patient has pulse or is breathing):    Full Support       The 10-year ASCVD risk score (Ed DE LA VEGA, et al., 2019) is: 6.4%    Values used to calculate the score:      Age: 67 years      Sex: Female      Is Non- : No      Diabetic: No      Tobacco smoker: No      Systolic Blood Pressure: 119 mmHg      Is BP treated: Yes      HDL Cholesterol: 55 mg/dL      Total Cholesterol: 158 mg/dL     Denzel Bernstein DO  [facility]  10/19/22  14:00 EDT    Please note that this discharge summary required more than 30 minutes to complete.

## 2022-10-19 NOTE — PROGRESS NOTES
[facility] HOSPITALIST PROGRESS NOTE     Patient Identification:  Name:  Ivis Vela  Age:  67 y.o.  Sex:  female  :  1955  MRN:  [id number]  Visit Number:  [id number]  ROOM: [place] [address]     Primary Care Provider:  Lawrence Faulkner DO    Length of stay in inpatient status:  1    Subjective     Chief Compliant:    Chief Complaint   Patient presents with   • Chest Pain       History of Presenting Illness:    Patient is in follow-up for chest pain rule out and presyncope.  She is awake, alert and orient x3.  She is anxious for left heart cath planned for this morning.  Denies any current chest pain, shortness of breath or dizziness/palpitations.  No adverse events noted overnight.    Objective     Current Hospital Meds:amitriptyline, 25 mg, Oral, Nightly  aspirin, 81 mg, Oral, Daily  atorvastatin, 40 mg, Oral, Nightly  isosorbide mononitrate, 60 mg, Oral, Daily  losartan, 100 mg, Oral, Q24H  metoprolol tartrate, 12.5 mg, Oral, Q12H  pantoprazole, 40 mg, Oral, Daily  senna-docusate sodium, 2 tablet, Oral, BID  sodium chloride, 10 mL, Intravenous, Q12H    heparin, 12 Units/kg/hr, Last Rate: 10 Units/kg/hr (10/19/22 0112)        Current Antimicrobial Therapy:  Anti-Infectives (From admission, onward)    None        Current Diuretic Therapy:  Diuretics (From admission, onward)    None        ----------------------------------------------------------------------------------------------------------------------  Vital Signs:  Temp:  [97.8 °F (36.6 °C)-98.6 °F (37 °C)] 98 °F (36.7 °C)  Heart Rate:  [48-80] 75  Resp:  [16-18] 18  BP: (124-168)/(65-96) 132/88  SpO2:  [96 %-100 %] 98 %  on   ;   Device (Oxygen Therapy): room air  Body mass index is 29 kg/m².    Wt Readings from Last 3 Encounters:   10/19/22 81.5 kg (179 lb 10.8 oz)   10/18/22 82 kg (180 lb 12.8 oz)   22 80.7 kg (178 lb)     Intake & Output (last 3 days)       10/16 0701  10/17 0700 10/17 0701  10/18 0700 10/18 0701  10/19 0700 10/19  0701  10/20 0700    P.O.   240 0    I.V. (mL/kg)   97.9 (1.2)     Total Intake(mL/kg)   337.9 (4.1) 0 (0)    Net   +337.9 0            Urine Unmeasured Occurrence   3 x         NPO Diet NPO Type: Sips with Meds  ----------------------------------------------------------------------------------------------------------------------  Physical exam:  Constitutional: Older female, nontoxic, speaks in full sentences, well-developed and well-nourished.  No respiratory distress.      HENT:  Head: Normocephalic and atraumatic.  Mouth:  Moist mucous membranes.    Eyes:  Conjunctivae and EOM are normal.  No scleral icterus.  Neck:  Neck supple.  No JVD present.    Cardiovascular:  Normal rate, regular rhythm and normal heart sounds with no murmur.  Pulmonary/Chest:  No respiratory distress, no wheezes, no crackles, with normal breath sounds and good air movement.  Abdominal:  Soft.  Bowel sounds are normal.  No distension and no tenderness.   Musculoskeletal:  No tenderness, and no deformity.  No red or swollen joints anywhere.    Neurological:  Alert and oriented to person, place, and time.  No cranial nerve deficit.  No tongue deviation.  No facial droop.  No slurred speech.   Skin:  Skin is warm and dry.  No rash noted.  No pallor.   Peripheral vascular:  No edema and pulses on all 4 extremities.  .  ----------------------------------------------------------------------------------------------------------------------  Results from last 7 days   Lab Units 10/18/22  1701 10/18/22  1105   WBC 10*3/mm3  --  7.19   HEMOGLOBIN g/dL  --  13.9   HEMATOCRIT %  --  42.5   MCV fL  --  91.0   MCHC g/dL  --  32.7   PLATELETS 10*3/mm3  --  302   INR  1.03  --          Results from last 7 days   Lab Units 10/19/22  0008 10/18/22  1300 10/18/22  1105   SODIUM mmol/L 140  --  142   POTASSIUM mmol/L 3.5  --  4.3   MAGNESIUM mg/dL 2.3 2.5*  --    CHLORIDE mmol/L 105  --  104   CO2 mmol/L 24.1  --  29.3*   BUN mg/dL 8  --  8   CREATININE mg/dL  0.80  --  0.86   CALCIUM mg/dL 9.3  --  9.5   GLUCOSE mg/dL 106*  --  110*   ALBUMIN g/dL  --   --  4.19   BILIRUBIN mg/dL  --   --  0.3   ALK PHOS U/L  --   --  92   AST (SGOT) U/L  --   --  12   ALT (SGPT) U/L  --   --  14   Estimated Creatinine Clearance: 74.5 mL/min (by C-G formula based on SCr of 0.8 mg/dL).  No results found for: AMMONIA  Results from last 7 days   Lab Units 10/18/22  1300 10/18/22  1105   TROPONIN T ng/mL <0.010 <0.010     Results from last 7 days   Lab Units 10/18/22  1105   PROBNP pg/mL 109.1     Results from last 7 days   Lab Units 10/18/22  1300   CHOLESTEROL mg/dL 158   TRIGLYCERIDES mg/dL 96   HDL CHOL mg/dL 55   LDL CHOL mg/dL 85     Hemoglobin A1C   Date/Time Value Ref Range Status   10/18/2022 1105 5.90 (H) 4.80 - 5.60 % Final     Lab Results   Component Value Date    TSH 2.230 10/18/2022     No results found for: PREGTESTUR, PREGSERUM, HCG, HCGQUANT  Pain Management Panel    There is no flowsheet data to display.       Brief Urine Lab Results     None        No results found for: BLOODCX  No results found for: URINECX  No results found for: WOUNDCX  No results found for: STOOLCX  No results found for: RESPCX  No results found for: AFBCX        I have personally looked at the labs and they are summarized above.  ----------------------------------------------------------------------------------------------------------------------  Detailed radiology reports for the last 24 hours:  Imaging Results (Last 24 Hours)     Procedure Component Value Units Date/Time    XR Chest 1 View [[id number]] Collected: 10/18/22 1359     Updated: 10/18/22 1401    Narrative:      EXAM:    XR Chest, 1 View     EXAM DATE:    10/18/2022 12:42 PM     CLINICAL HISTORY:    chest pain     TECHNIQUE:    Frontal view of the chest.     COMPARISON:    No relevant prior studies available.     FINDINGS:    Lungs:  Unremarkable.  No consolidation.    Pleural space:  Unremarkable.  No pneumothorax.    Heart:   Unremarkable.  No cardiomegaly.    Mediastinum:  Unremarkable.    Bones/joints:  Unremarkable.       Impression:        Unremarkable exam. No acute cardiopulmonary findings identified.     This report was finalized on 10/18/2022 1:59 PM by Dr. Troy Kay MD.           Assessment & Plan      Patient is a 67-year-old female with history significant for hypertension, hyperlipidemia and tobacco abuse who presented to the ER from outpatient cardiology for chest pressure and presyncopal episodes.      #Unstable angina  #Presyncope, concern for arrhythmogenic etiology  #Nonsustained ventricular tachycardia  #Paroxysmal atrial fibrillation, new  #Essential hypertension  #Tobacco abuse  #Osteoporosis     --Patient sent over from outpatient cardiology office for further ischemic work-up for chest pain and palpitations with recent 25 beat run of V. tach at 200 bpm noted on Holter.  I did discuss with Dr. Arceo, although she did have a negative stress in August, she did have 40 to 50% stenosis on left heart cath in 2014 and her symptoms have been refractory to medical management to date.  Given cardiac arrhythmias with presyncopal episodes and now chest tightness, cards recommended evaluation by interventional cardiology for left heart cath.  --Recent A. fib noted on Holter monitor, placed on Eliquis-last dose 10/15 p.m.  --EKG sinus bradycardia, nonischemic  --troponins neg x2, magnesium WNL  --A1c/TSH/lipid panel reviewed  --most recent [facility] in 2014 noted 50% stenosis of Lcx and 40-50% LAD  --august 2022-ischemic stress test low risk   --echocardiogram ordered to evaluate wall motion, systolic and diastolic function  --continue daily aspirin, increase to high intensity statin, can you decreased metoprolol tartrate twice daily  --continue therapeutic anticoagulation with heparin GTT  --Interventional cardiology following, plan for left heart catheterization this a.m.  --Continue to monitor on PCU, further recs pending  results of left heart cath     Checklist:  Antibiotics: none  Steroids: none  DVT ppx: Heparin GTT  GI ppx: ppi  Diet: N.p.o. for heart cath, can resume consistent carb diet post-cath  Code: CPR, full  Risk/dispo: Patient is high risk d/t unstable angina requiring therapuetic heparin gtt and left heart catheterization.  Anticipate 24 to 48 hours pending results of left heart cath today.  Dispo expected home when medically stable.    Denzel Bernstein DO  [facility]  10/19/22  09:51 EDT

## 2022-10-19 NOTE — PROGRESS NOTES
[facility] cardiology  INPATIENT PROGRESS NOTE    Name: Ivis Vela  Age/Sex: 67 y.o. female  :  1955        PCP: Lawrence Faulkner DO          Subjective   No events overnight no new complaints pending heart catheterization    Patient Active Problem List   Diagnosis   • Hypertension   • Asthma   • Osteoporosis   • Left arm pain   • Closed nondisplaced fracture of head of radius with routine healing   • Palpitations   • Dizziness and giddiness   • Unstable angina (HCC)       Past Medical History:   Diagnosis Date   • Arm fracture    • Hyperlipidemia    • Hypertension    • Osteoporosis        Current Facility-Administered Medications   Medication Dose Route Frequency Provider Last Rate Last Admin   • acetaminophen (TYLENOL) tablet 650 mg  650 mg Oral Q4H PRN Denzel Bernstein DO       • amitriptyline (ELAVIL) tablet 25 mg  25 mg Oral Nightly Denzel Bernstein DO   25 mg at 10/18/22 2032   • aspirin EC tablet 81 mg  81 mg Oral Daily Denzel Bernstein DO   81 mg at 10/19/22 0836   • atorvastatin (LIPITOR) tablet 40 mg  40 mg Oral Nightly Denzel Bernstein DO   40 mg at 10/18/22 2032   • sennosides-docusate (PERICOLACE) 8.6-50 MG per tablet 2 tablet  2 tablet Oral BID Denzel Bernstein DO   2 tablet at 10/19/22 0837    And   • polyethylene glycol (MIRALAX) packet 17 g  17 g Oral Daily PRN Deznel Bernstein DO        And   • bisacodyl (DULCOLAX) EC tablet 5 mg  5 mg Oral Daily PRN Denzel Bernstein DO        And   • bisacodyl (DULCOLAX) suppository 10 mg  10 mg Rectal Daily PRN Denzel Bernstein DO       • heparin (porcine) 5000 UNIT/ML injection 2,400 Units  30 Units/kg Intravenous PRN Denzel Bernstein DO       • heparin (porcine) 5000 UNIT/ML injection 4,900 Units  60 Units/kg Intravenous PRN Denzel Bernstein DO       • heparin 82370 units/250 mL (100 units/mL) in 0.45 % NaCl infusion  12 Units/kg/hr Intravenous  Titrated Denzel Bernstein DO 8.16 mL/hr at 10/19/22 0112 10 Units/kg/hr at 10/19/22 0112   • isosorbide mononitrate (IMDUR) 24 hr tablet 60 mg  60 mg Oral Daily Denzel Bernstein DO   60 mg at 10/19/22 0836   • losartan (COZAAR) tablet 100 mg  100 mg Oral Q24H Denzel Bernstein DO   100 mg at 10/19/22 0836   • metoprolol tartrate (LOPRESSOR) tablet 12.5 mg  12.5 mg Oral Q12H Denzel Bernstein DO   12.5 mg at 10/19/22 0836   • nitroglycerin (NITROSTAT) SL tablet 0.4 mg  0.4 mg Sublingual Q5 Min PRN Denzel Bernstein DO       • ondansetron (ZOFRAN) injection 4 mg  4 mg Intravenous Q6H PRN Denzel Bernstein DO       • pantoprazole (PROTONIX) EC tablet 40 mg  40 mg Oral Daily Denzel Bernstein DO   40 mg at 10/19/22 0837   • sodium chloride 0.9 % flush 10 mL  10 mL Intravenous Q12H Denzel Bernstein DO   10 mL at 10/19/22 0836   • sodium chloride 0.9 % flush 10 mL  10 mL Intravenous PRN Denzel Bernstein DO           Past Surgical History:   Procedure Laterality Date   • CARPAL TUNNEL RELEASE Bilateral    • CATARACT EXTRACTION     • CHOLECYSTECTOMY     • ESOPHAGOSCOPY W/ DILATION     • HYSTERECTOMY         Social History     Socioeconomic History   • Marital status:    Tobacco Use   • Smoking status: Former     Types: Electronic Cigarette, Cigarettes     Quit date:      Years since quittin.8   • Smokeless tobacco: Never   Substance and Sexual Activity   • Alcohol use: No   • Drug use: No       Vital Signs  Temp:  [97.8 °F (36.6 °C)-98.6 °F (37 °C)] 98 °F (36.7 °C)  Heart Rate:  [48-80] 73  Resp:  [16-18] 18  BP: (124-168)/(65-96) 168/88  Body mass index is 29 kg/m².    P/E    General: No acute distress  Neck: Supple.  No JVD, no thyroid enlargement.  Chest: Air entry equal, normal respiration.  No rhonchi or creps.  Cardiovascular system:  Regular rate and rhythm, no murmurs.  Abdomen: Soft, no tenderness, bowel sounds present, no  hepatosplenomegaly.  CNS: Alert, oriented to place and time.  No motor or sensory deficit.  Cranial nerves intact.  Musculoskeletal: No deformity of the back or spine.  Extremities:  No edema.  Pulses equal on both sides.      Lab Results (last 24 hours)     Procedure Component Value Units Date/Time    CBC & Differential [[id number]]  (Normal) Collected: 10/18/22 1105    Specimen: Blood Updated: 10/18/22 1111    Narrative:      The following orders were created for panel order CBC & Differential.  Procedure                               Abnormality         Status                     ---------                               -----------         ------                     CBC Auto Differential[[id number]]        Normal              Final result                 Please view results for these tests on the individual orders.    Comprehensive Metabolic Panel [[id number]]  (Abnormal) Collected: 10/18/22 1105    Specimen: Blood Updated: 10/18/22 1145     Glucose 110 mg/dL      BUN 8 mg/dL      Creatinine 0.86 mg/dL      Sodium 142 mmol/L      Potassium 4.3 mmol/L      Chloride 104 mmol/L      CO2 29.3 mmol/L      Calcium 9.5 mg/dL      Total Protein 6.6 g/dL      Albumin 4.19 g/dL      ALT (SGPT) 14 U/L      AST (SGOT) 12 U/L      Alkaline Phosphatase 92 U/L      Total Bilirubin 0.3 mg/dL      Globulin 2.4 gm/dL      A/G Ratio 1.7 g/dL      BUN/Creatinine Ratio 9.3     Anion Gap 8.7 mmol/L      eGFR 74.6 mL/min/1.73      Comment: National Kidney Foundation and American Society of Nephrology (ASN) Task Force recommended calculation based on the Chronic Kidney Disease Epidemiology Collaboration (CKD-EPI) equation refit without adjustment for race.       Narrative:      GFR Normal >60  Chronic Kidney Disease <60  Kidney Failure <15      Troponin [[id number]]  (Normal) Collected: 10/18/22 1105    Specimen: Blood Updated: 10/18/22 1145     Troponin T <0.010 ng/mL     Narrative:      Troponin T Reference Range:  <= 0.03 ng/mL-    Negative for AMI  >0.03 ng/mL-     Abnormal for myocardial necrosis.  Clinicians would have to utilize clinical acumen, EKG, Troponin and serial changes to determine if it is an Acute Myocardial Infarction or myocardial injury due to an underlying chronic condition.       Results may be falsely decreased if patient taking Biotin.      D-dimer, Quantitative [[id number]]  (Normal) Collected: 10/18/22 1105    Specimen: Blood Updated: 10/18/22 1125     D-Dimer, Quantitative <0.27 MCGFEU/mL     Narrative:      d-Dimer assay result is to be used in conjunction with a non-high clinical pretest probability (PTP) assessment tool to exclude PE and aid in diagnosis of VTE with cutoff of 0.5 MCGFEU/mL.    BNP [[id number]]  (Normal) Collected: 10/18/22 1105    Specimen: Blood Updated: 10/18/22 1142     proBNP 109.1 pg/mL     Narrative:      Among patients with dyspnea, NT-proBNP is highly sensitive for the detection of acute congestive heart failure. In addition NT-proBNP of <300 pg/ml effectively rules out acute congestive heart failure with 99% negative predictive value.      CBC Auto Differential [[id number]]  (Normal) Collected: 10/18/22 1105    Specimen: Blood Updated: 10/18/22 1111     WBC 7.19 10*3/mm3      RBC 4.67 10*6/mm3      Hemoglobin 13.9 g/dL      Hematocrit 42.5 %      MCV 91.0 fL      MCH 29.8 pg      MCHC 32.7 g/dL      RDW 13.5 %      RDW-SD 45.1 fl      MPV 10.7 fL      Platelets 302 10*3/mm3      Neutrophil % 57.9 %      Lymphocyte % 32.3 %      Monocyte % 7.6 %      Eosinophil % 1.1 %      Basophil % 0.8 %      Immature Grans % 0.3 %      Neutrophils, Absolute 4.16 10*3/mm3      Lymphocytes, Absolute 2.32 10*3/mm3      Monocytes, Absolute 0.55 10*3/mm3      Eosinophils, Absolute 0.08 10*3/mm3      Basophils, Absolute 0.06 10*3/mm3      Immature Grans, Absolute 0.02 10*3/mm3      nRBC 0.0 /100 WBC     Hemoglobin A1c [[id number]]  (Abnormal) Collected: 10/18/22 1105    Specimen: Blood Updated: 10/18/22 7178      Hemoglobin A1C 5.90 %     Narrative:      Hemoglobin A1C Ranges:    Increased Risk for Diabetes  5.7% to 6.4%  Diabetes                     >= 6.5%  Diabetic Goal                < 7.0%    Troponin [[id number]]  (Normal) Collected: 10/18/22 1300    Specimen: Blood Updated: 10/18/22 1333     Troponin T <0.010 ng/mL     Narrative:      Troponin T Reference Range:  <= 0.03 ng/mL-   Negative for AMI  >0.03 ng/mL-     Abnormal for myocardial necrosis.  Clinicians would have to utilize clinical acumen, EKG, Troponin and serial changes to determine if it is an Acute Myocardial Infarction or myocardial injury due to an underlying chronic condition.       Results may be falsely decreased if patient taking Biotin.      Lipid Panel [[id number]] Collected: 10/18/22 1300    Specimen: Blood Updated: 10/18/22 1709     Total Cholesterol 158 mg/dL      Triglycerides 96 mg/dL      HDL Cholesterol 55 mg/dL      LDL Cholesterol  85 mg/dL      VLDL Cholesterol 18 mg/dL      LDL/HDL Ratio 1.52    Narrative:      Cholesterol Reference Ranges  (U.S. Department of Health and Human Services ATP III Classifications)    Desirable          <200 mg/dL  Borderline High    200-239 mg/dL  High Risk          >240 mg/dL      Triglyceride Reference Ranges  (U.S. Department of Health and Human Services ATP III Classifications)    Normal           <150 mg/dL  Borderline High  150-199 mg/dL  High             200-499 mg/dL  Very High        >500 mg/dL    HDL Reference Ranges  (U.S. Department of Health and Human Services ATP III Classifications)    Low     <40 mg/dl (major risk factor for CHD)  High    >60 mg/dl ('negative' risk factor for CHD)        LDL Reference Ranges  (U.S. Department of Health and Human Services ATP III Classifications)    Optimal          <100 mg/dL  Near Optimal     100-129 mg/dL  Borderline High  130-159 mg/dL  High             160-189 mg/dL  Very High        >189 mg/dL    TSH [[id number]]  (Normal) Collected: 10/18/22 1300     Specimen: Blood Updated: 10/18/22 1715     TSH 2.230 uIU/mL     Magnesium [[id number]]  (Abnormal) Collected: 10/18/22 1300    Specimen: Blood Updated: 10/18/22 1709     Magnesium 2.5 mg/dL     COVID-19 and FLU A/B PCR - Swab, Nasopharynx [[id number]]  (Normal) Collected: 10/18/22 1448    Specimen: Swab from Nasopharynx Updated: 10/18/22 1529     COVID19 Not Detected     Influenza A PCR Not Detected     Influenza B PCR Not Detected    Narrative:      Fact sheet for providers: https://www.fda.gov/media/844564/download    Fact sheet for patients: https://www.fda.gov/media/925163/download    Test performed by PCR.    Protime-INR [[id number]]  (Normal) Collected: 10/18/22 1701    Specimen: Blood Updated: 10/18/22 1729     Protime 13.7 Seconds      INR 1.03    Narrative:      Suggested INR therapeutic range for stable oral anticoagulant therapy:    Low Intensity therapy:   1.5-2.0  Moderate Intensity therapy:   2.0-3.0  High Intensity therapy:   2.5-4.0    aPTT [[id number]]  (Abnormal) Collected: 10/18/22 1701    Specimen: Blood Updated: 10/18/22 1729     PTT 35.9 seconds     Narrative:      PTT Heparin Therapeutic Range:  59 - 95 seconds      aPTT [[id number]]  (Abnormal) Collected: 10/19/22 0008    Specimen: Blood Updated: 10/19/22 0108     PTT 93.3 seconds     Narrative:      PTT Heparin Therapeutic Range:  59 - 95 seconds      Basic Metabolic Panel [[id number]]  (Abnormal) Collected: 10/19/22 0008    Specimen: Blood Updated: 10/19/22 0100     Glucose 106 mg/dL      BUN 8 mg/dL      Creatinine 0.80 mg/dL      Sodium 140 mmol/L      Potassium 3.5 mmol/L      Comment: Slight hemolysis detected by analyzer. Results may be affected.        Chloride 105 mmol/L      CO2 24.1 mmol/L      Calcium 9.3 mg/dL      BUN/Creatinine Ratio 10.0     Anion Gap 10.9 mmol/L      eGFR 81.4 mL/min/1.73      Comment: National Kidney Foundation and American Society of Nephrology (ASN) Task Force recommended calculation based on the Chronic Kidney  Disease Epidemiology Collaboration (CKD-EPI) equation refit without adjustment for race.       Narrative:      GFR Normal >60  Chronic Kidney Disease <60  Kidney Failure <15      Magnesium [[id number]]  (Normal) Collected: 10/19/22 0008    Specimen: Blood Updated: 10/19/22 0100     Magnesium 2.3 mg/dL     aPTT [[id number]]  (Abnormal) Collected: 10/19/22 0715    Specimen: Blood Updated: 10/19/22 0752     PTT 59.1 seconds     Narrative:      PTT Heparin Therapeutic Range:  59 - 95 seconds              A/P  ACS UA admitted from primary cardiologist office for invasive evaluation due to progressive typical anginal symptoms failing medical management  Paroxysmal afibrillation  Hypertension  Hyperlipidemia  Tobacco abuse    This document has been electronically signed by Piotr Edwards DO on October 19, 2022 09:02 EDT         Part of this note may be an electronic transcription/translation of spoken language to printed text using the Dragon Dictation System.

## 2022-10-19 NOTE — NURSING NOTE
Cardiac Rehab referral received on patient. After reviewing patient information there is no qualifying diagnosis noted at this time .Qualifications for Cardiac Rehab include Coronary Stenting, AMI (NSTEMI, STEMI), Stable Angina, CABG, Heart Valve Repair/Replacement, Heart Transplant or Stable CHF (with these specific qualifications, Left Ventricular Ejection Fraction of 35% or less, NYHA class II to IV symptoms despite optimal heart failure therapy for at least 6 weeks and has not had a recent (less than or equal to 6 weeks) or planned (less than or equal to 6 months) major cardiovascular hospitalizations or procedures. Due to patient being in the hospital, does not meet criteria at this time) Please contact us at [phone] with questions.    If the diagnosis is CHF when the patient meets the CHF criteria for Cardiac Rehab with a new order we will gladly schedule them.

## 2022-10-27 NOTE — ED PROVIDER NOTES
"Subjective   History of Present Illness  Patient is a 67-year-old female with PMHx hypertension, hyperlipidemia and tobacco abuse who presented to the ER from outpatient cardiology for chest pressure and presyncopal episodes.  Patient states this morning she started experiencing tightness/heaviness in her left chest with radiation to her shoulder and jaw.  No nausea/vomiting  She reports a several month history of palpitations resulting in dizziness/lightheadedness.  She says when these events occur, she has to reach out to catch her self to keep from falling and feels like she \"almost passes out \".  Pt denies any current chest pain.   Chest pain score 2    History provided by:  Patient   used: No    Chest Pain  Pain location:  L chest  Pain quality: aching    Pain radiates to:  Does not radiate  Pain severity:  Mild  Onset quality:  Sudden  Duration:  1 day  Timing:  Constant  Progression:  Worsening  Chronicity:  New  Relieved by:  Nothing  Worsened by:  Nothing  Associated symptoms: no cough, no fever, no headache, no nausea, no shortness of breath and no vomiting    Risk factors: high cholesterol, hypertension and smoking        Review of Systems   Constitutional: Negative for chills and fever.   HENT: Negative for congestion, ear pain and sore throat.    Respiratory: Negative for cough, shortness of breath and wheezing.    Cardiovascular: Positive for chest pain.   Gastrointestinal: Negative for diarrhea, nausea and vomiting.   Genitourinary: Negative for dysuria and flank pain.   Skin: Negative for rash.   Neurological: Negative for headaches.   Psychiatric/Behavioral: The patient is not nervous/anxious.    All other systems reviewed and are negative.      Past Medical History:   Diagnosis Date   • Arm fracture    • Hyperlipidemia    • Hypertension    • Osteoporosis        No Known Allergies    Past Surgical History:   Procedure Laterality Date   • CARDIAC CATHETERIZATION N/A 10/19/2022    " Procedure: Left Heart Cath;  Surgeon: Piotr Edwards DO;  Location:  [facility] CATH [facility]VASIVE LOCATION;  Service: Cardiology;  Laterality: N/A;   • CARPAL TUNNEL RELEASE Bilateral    • CATARACT EXTRACTION     • CHOLECYSTECTOMY     • ESOPHAGOSCOPY W/ DILATION     • HYSTERECTOMY         Family History   Problem Relation Age of Onset   • Hypertension Mother    • Heart disease Father    • Hypertension Father    • Hypertension Sister    • Heart disease Brother    • Hypertension Brother    • Diabetes Brother    • Breast cancer Paternal Cousin    • Breast cancer Maternal Cousin        Social History     Socioeconomic History   • Marital status:    Tobacco Use   • Smoking status: Former     Types: Electronic Cigarette, Cigarettes     Quit date:      Years since quittin.8   • Smokeless tobacco: Never   Substance and Sexual Activity   • Alcohol use: No   • Drug use: No           Objective   Physical Exam  Vitals and nursing note reviewed.   Constitutional:       Appearance: She is well-developed.   HENT:      Head: Normocephalic.   Cardiovascular:      Rate and Rhythm: Normal rate and regular rhythm.   Pulmonary:      Effort: Pulmonary effort is normal.      Breath sounds: Normal breath sounds.   Abdominal:      General: Bowel sounds are normal.      Palpations: Abdomen is soft.      Tenderness: There is no abdominal tenderness.   Musculoskeletal:         General: Normal range of motion.      Cervical back: Neck supple.   Skin:     General: Skin is warm and dry.   Neurological:      Mental Status: She is alert and oriented to person, place, and time.   Psychiatric:         Behavior: Behavior normal.         Thought Content: Thought content normal.         Judgment: Judgment normal.         Procedures           ED Course  ED Course as of 10/27/22 1048   Tue Oct 18, 2022   1028 EKG noted sinus bradycardia.  40 bpm.  QTc 366.  No acute ST elevation [SF]   1440 Discussed with Dr Bernstein, will admit  [LC]      ED Course  User Index  [LC] Nadira Moreno PA  [SF] Vahe Herrmann DO                                           MDM    Final diagnoses:   Chest pain, unspecified type       ED Disposition  ED Disposition     ED Disposition   Decision to Admit    Condition   --    Comment   Level of Care: Progressive Care [20]   Diagnosis: Unstable angina (HCC) [165530]   Certification: I Certify That Inpatient Hospital Services Are Medically Necessary For Greater Than 2 Midnights               Lawrence Faulkner DO  [address]  [address]  [place] [id number] [phone]      1wk hospiutal fu         Medication List      New Prescriptions    ranolazine 500 MG 12 hr tablet  Commonly known as: RANEXA  Take 1 tablet by mouth Every 12 (Twelve) Hours for 30 days.        Changed    atorvastatin 80 MG tablet  Commonly known as: LIPITOR  Take 1 tablet by mouth Every Night for 30 days.  What changed:   · medication strength  · how much to take  · when to take this     metoprolol tartrate 25 MG tablet  Commonly known as: LOPRESSOR  Take 0.5 tablets by mouth 2 (Two) Times a Day for 30 days.  What changed:   · medication strength  · how much to take           Where to Get Your Medications      These medications were sent to [address] [address]    Hours: 8AM-6PM Mon-Fri Phone: [phone]   · atorvastatin 80 MG tablet  · Eliquis 5 MG tablet tablet  · ranolazine 500 MG 12 hr tablet     Information about where to get these medications is not yet available    Ask your nurse or doctor about these medications  · metoprolol tartrate 25 MG tablet          Nadira Moreno PA  10/27/22 1049

## 2022-10-31 ENCOUNTER — OFFICE VISIT (OUTPATIENT)
Dept: CARDIOLOGY | Facility: CLINIC | Age: 67
End: 2022-10-31

## 2022-10-31 VITALS
SYSTOLIC BLOOD PRESSURE: 138 MMHG | DIASTOLIC BLOOD PRESSURE: 81 MMHG | BODY MASS INDEX: 28.96 KG/M2 | WEIGHT: 180.2 LBS | HEIGHT: 66 IN | HEART RATE: 70 BPM

## 2022-10-31 DIAGNOSIS — I47.20 VENTRICULAR TACHYCARDIA: ICD-10-CM

## 2022-10-31 DIAGNOSIS — R00.1 BRADYCARDIA, SINUS: ICD-10-CM

## 2022-10-31 DIAGNOSIS — I25.10 ASCVD (ARTERIOSCLEROTIC CARDIOVASCULAR DISEASE): Primary | ICD-10-CM

## 2022-10-31 DIAGNOSIS — I48.0 PAROXYSMAL ATRIAL FIBRILLATION: ICD-10-CM

## 2022-10-31 DIAGNOSIS — I10 ESSENTIAL HYPERTENSION: ICD-10-CM

## 2022-10-31 PROCEDURE — 99214 OFFICE O/P EST MOD 30 MIN: CPT | Performed by: NURSE PRACTITIONER

## 2022-11-01 ENCOUNTER — APPOINTMENT (OUTPATIENT)
Dept: GENERAL RADIOLOGY | Facility: HOSPITAL | Age: 67
End: 2022-11-01

## 2022-11-01 ENCOUNTER — HOSPITAL ENCOUNTER (EMERGENCY)
Facility: HOSPITAL | Age: 67
Discharge: HOME | End: 2022-11-01
Attending: EMERGENCY MEDICINE | Admitting: EMERGENCY MEDICINE

## 2022-11-01 ENCOUNTER — TELEPHONE (OUTPATIENT)
Dept: CARDIOLOGY | Facility: CLINIC | Age: 67
End: 2022-11-01

## 2022-11-01 VITALS
TEMPERATURE: 98 F | SYSTOLIC BLOOD PRESSURE: 116 MMHG | RESPIRATION RATE: 18 BRPM | HEART RATE: 101 BPM | WEIGHT: 180 LBS | BODY MASS INDEX: 30.73 KG/M2 | OXYGEN SATURATION: 99 % | HEIGHT: 64 IN | DIASTOLIC BLOOD PRESSURE: 74 MMHG

## 2022-11-01 DIAGNOSIS — R07.89 ATYPICAL CHEST PAIN: Primary | ICD-10-CM

## 2022-11-01 LAB
ALBUMIN SERPL-MCNC: 4.1 G/DL (ref 3.5–5.2)
ALBUMIN/GLOB SERPL: 1.4 G/DL
ALP SERPL-CCNC: 92 U/L (ref 39–117)
ALT SERPL W P-5'-P-CCNC: 13 U/L (ref 1–33)
ANION GAP SERPL CALCULATED.3IONS-SCNC: 12.8 MMOL/L (ref 5–15)
AST SERPL-CCNC: 13 U/L (ref 1–32)
BASOPHILS # BLD AUTO: 0.02 10*3/MM3 (ref 0–0.2)
BASOPHILS NFR BLD AUTO: 0.2 % (ref 0–1.5)
BILIRUB SERPL-MCNC: 0.6 MG/DL (ref 0–1.2)
BUN SERPL-MCNC: 12 MG/DL (ref 8–23)
BUN/CREAT SERPL: 13.2 (ref 7–25)
CALCIUM SPEC-SCNC: 8.9 MG/DL (ref 8.6–10.5)
CHLORIDE SERPL-SCNC: 101 MMOL/L (ref 98–107)
CO2 SERPL-SCNC: 25.2 MMOL/L (ref 22–29)
CREAT SERPL-MCNC: 0.91 MG/DL (ref 0.57–1)
DEPRECATED RDW RBC AUTO: 45.1 FL (ref 37–54)
EGFRCR SERPLBLD CKD-EPI 2021: 69.7 ML/MIN/1.73
EOSINOPHIL # BLD AUTO: 0.03 10*3/MM3 (ref 0–0.4)
EOSINOPHIL NFR BLD AUTO: 0.3 % (ref 0.3–6.2)
ERYTHROCYTE [DISTWIDTH] IN BLOOD BY AUTOMATED COUNT: 13.4 % (ref 12.3–15.4)
GLOBULIN UR ELPH-MCNC: 2.9 GM/DL
GLUCOSE SERPL-MCNC: 120 MG/DL (ref 65–99)
HCT VFR BLD AUTO: 44.2 % (ref 34–46.6)
HGB BLD-MCNC: 14.6 G/DL (ref 12–15.9)
HOLD SPECIMEN: NORMAL
HOLD SPECIMEN: NORMAL
IMM GRANULOCYTES # BLD AUTO: 0.03 10*3/MM3 (ref 0–0.05)
IMM GRANULOCYTES NFR BLD AUTO: 0.3 % (ref 0–0.5)
INR PPP: 1.04 (ref 0.9–1.1)
LYMPHOCYTES # BLD AUTO: 0.51 10*3/MM3 (ref 0.7–3.1)
LYMPHOCYTES NFR BLD AUTO: 5.5 % (ref 19.6–45.3)
MCH RBC QN AUTO: 30.2 PG (ref 26.6–33)
MCHC RBC AUTO-ENTMCNC: 33 G/DL (ref 31.5–35.7)
MCV RBC AUTO: 91.3 FL (ref 79–97)
MONOCYTES # BLD AUTO: 0.39 10*3/MM3 (ref 0.1–0.9)
MONOCYTES NFR BLD AUTO: 4.2 % (ref 5–12)
NEUTROPHILS NFR BLD AUTO: 8.25 10*3/MM3 (ref 1.7–7)
NEUTROPHILS NFR BLD AUTO: 89.5 % (ref 42.7–76)
NRBC BLD AUTO-RTO: 0 /100 WBC (ref 0–0.2)
PLATELET # BLD AUTO: 276 10*3/MM3 (ref 140–450)
PMV BLD AUTO: 10.4 FL (ref 6–12)
POTASSIUM SERPL-SCNC: 3.7 MMOL/L (ref 3.5–5.2)
PROT SERPL-MCNC: 7 G/DL (ref 6–8.5)
PROTHROMBIN TIME: 13.9 SECONDS (ref 12.1–14.7)
QT INTERVAL: 332 MS
QTC INTERVAL: 434 MS
RBC # BLD AUTO: 4.84 10*6/MM3 (ref 3.77–5.28)
SODIUM SERPL-SCNC: 139 MMOL/L (ref 136–145)
TROPONIN T SERPL-MCNC: <0.01 NG/ML (ref 0–0.03)
TROPONIN T SERPL-MCNC: <0.01 NG/ML (ref 0–0.03)
WBC NRBC COR # BLD: 9.23 10*3/MM3 (ref 3.4–10.8)
WHOLE BLOOD HOLD COAG: NORMAL
WHOLE BLOOD HOLD SPECIMEN: NORMAL

## 2022-11-01 PROCEDURE — 99284 EMERGENCY DEPT VISIT MOD MDM: CPT

## 2022-11-01 PROCEDURE — 85610 PROTHROMBIN TIME: CPT | Performed by: STUDENT IN AN ORGANIZED HEALTH CARE EDUCATION/TRAINING PROGRAM

## 2022-11-01 PROCEDURE — 36415 COLL VENOUS BLD VENIPUNCTURE: CPT

## 2022-11-01 PROCEDURE — 85025 COMPLETE CBC W/AUTO DIFF WBC: CPT | Performed by: STUDENT IN AN ORGANIZED HEALTH CARE EDUCATION/TRAINING PROGRAM

## 2022-11-01 PROCEDURE — 84484 ASSAY OF TROPONIN QUANT: CPT | Performed by: STUDENT IN AN ORGANIZED HEALTH CARE EDUCATION/TRAINING PROGRAM

## 2022-11-01 PROCEDURE — 93010 ELECTROCARDIOGRAM REPORT: CPT | Performed by: INTERNAL MEDICINE

## 2022-11-01 PROCEDURE — 93005 ELECTROCARDIOGRAM TRACING: CPT | Performed by: EMERGENCY MEDICINE

## 2022-11-01 PROCEDURE — 71045 X-RAY EXAM CHEST 1 VIEW: CPT

## 2022-11-01 PROCEDURE — 80053 COMPREHEN METABOLIC PANEL: CPT | Performed by: STUDENT IN AN ORGANIZED HEALTH CARE EDUCATION/TRAINING PROGRAM

## 2022-11-01 PROCEDURE — 71045 X-RAY EXAM CHEST 1 VIEW: CPT | Performed by: RADIOLOGY

## 2022-11-01 RX ORDER — ASPIRIN 81 MG/1
324 TABLET, CHEWABLE ORAL ONCE
Status: COMPLETED | OUTPATIENT
Start: 2022-11-01 | End: 2022-11-01

## 2022-11-01 RX ORDER — SODIUM CHLORIDE 0.9 % (FLUSH) 0.9 %
10 SYRINGE (ML) INJECTION AS NEEDED
Status: DISCONTINUED | OUTPATIENT
Start: 2022-11-01 | End: 2022-11-01 | Stop reason: HOSPADM

## 2022-11-01 RX ORDER — HYDROCODONE BITARTRATE AND ACETAMINOPHEN 7.5; 325 MG/1; MG/1
1 TABLET ORAL ONCE
Status: DISCONTINUED | OUTPATIENT
Start: 2022-11-01 | End: 2022-11-01 | Stop reason: HOSPADM

## 2022-11-01 RX ORDER — ACETAMINOPHEN 500 MG
1000 TABLET ORAL ONCE
Status: COMPLETED | OUTPATIENT
Start: 2022-11-01 | End: 2022-11-01

## 2022-11-01 RX ADMIN — ACETAMINOPHEN 1000 MG: 500 TABLET, FILM COATED ORAL at 16:58

## 2022-11-01 RX ADMIN — ASPIRIN 324 MG: 81 TABLET, CHEWABLE ORAL at 15:27

## 2022-11-01 NOTE — ED NOTES
MEDICAL SCREENING:    Reason for Visit: 67-year-old female presents with complaints of anterior chest pain.  On arrival in ED; patient is afebrile and hemodynamically stable    Patient initially seen in triage.  The patient was advised further evaluation and diagnostic testing will be needed, some of the treatment and testing will be initiated in the lobby in order to begin the process.  The patient will be returned to the waiting area for the time being and possibly be re-assessed by a subsequent ED provider.  The patient will be brought back to the treatment area in as timely manner as possible.         Geovanna Burnett DO  11/01/22 1458

## 2022-11-01 NOTE — ED PROVIDER NOTES
Subjective   History of Present Illness  Patient presents to ER with chest pain and palpitations. She has history atrial fib., and had negative heart cath week ago    Chest Pain  Pain location:  Unable to specify  Pain radiates to:  L shoulder  Pain severity:  Mild  Onset quality:  Gradual  Chronicity:  Recurrent  Context: breathing    Relieved by:  Nothing  Worsened by:  Nothing  Ineffective treatments:  None tried      Review of Systems   Constitutional: Positive for activity change.   HENT: Negative.    Eyes: Negative.    Respiratory: Negative.    Cardiovascular: Positive for chest pain.   Gastrointestinal: Negative.    Endocrine: Negative.    Musculoskeletal: Negative.    Skin: Negative.    Allergic/Immunologic: Negative.    Neurological: Negative.    Hematological: Negative.    Psychiatric/Behavioral: Negative.        Past Medical History:   Diagnosis Date   • Arm fracture    • Hyperlipidemia    • Hypertension    • Osteoporosis        No Known Allergies    Past Surgical History:   Procedure Laterality Date   • CARDIAC CATHETERIZATION N/A 10/19/2022    Procedure: Left Heart Cath;  Surgeon: Piotr Edwards DO;  Location: [facility] CATH INVASIVE LOC[facility];  Service: Cardiology;  Laterality: N/A;   • CARPAL TUNNEL RELEASE Bilateral    • CATARACT EXTRACTION     • CHOLECYSTECTOMY     • ESOPHAGOSCOPY W/ DILATION     • HYSTERECTOMY         Family History   Problem Relation Age of Onset   • Hypertension Mother    • Heart disease Father    • Hypertension Father    • Hypertension Sister    • Heart disease Brother    • Hypertension Brother    • Diabetes Brother    • Breast cancer Paternal Cousin    • Breast cancer Maternal Cousin        Social History     Socioeconomic History   • Marital status:    Tobacco Use   • Smoking status: Former     Types: Electronic Cigarette, Cigarettes     Quit date:      Years since quittin.8   • Smokeless tobacco: Never   Substance and Sexual Activity   • Alcohol use: No   • Drug  use: No           Objective   Physical Exam  Vitals and nursing note reviewed.   HENT:      Head: Normocephalic.   Eyes:      Pupils: Pupils are equal, round, and reactive to light.   Cardiovascular:      Rate and Rhythm: Normal rate.      Heart sounds: Normal heart sounds.   Pulmonary:      Effort: Pulmonary effort is normal.      Breath sounds: Normal breath sounds.   Abdominal:      Palpations: Abdomen is soft.   Musculoskeletal:         General: Normal range of motion.      Cervical back: Normal range of motion.   Skin:     General: Skin is warm.      Capillary Refill: Capillary refill takes less than 2 seconds.   Neurological:      General: No focal deficit present.      Mental Status: She is alert.   Psychiatric:         Mood and Affect: Mood normal.         Procedures           ED Course  ED Course as of 11/01/22 1837   Tue Nov 01, 2022   1508 eCG 14:32 Sinus tachycardia, rate 103. ST andT wave abnormality , consider inferior ischemia. QT/qTc 332/434 [DENTON]      ED Course User Index  [DENTON] Manfred Moreno MD                                           [facility]    Final diagnoses:   Atypical chest pain       ED Disposition  ED Disposition     ED Disposition   Discharge    Condition   Stable    Comment   --             Lawrence Faulkner, [place] [address]  [phone]    Schedule an appointment as soon as possible for a visit   If symptoms worsen         Medication List      No changes were made to your prescriptions during this visit.          Manfred Moreno MD  11/01/22 0366

## 2022-11-02 NOTE — PROGRESS NOTES
"       Cardiac Electrophysiology Outpatient Note  [facility] at [facility]    Office Visit     Ivis Vela  [id number]  11/04/2022    Primary Care Physician: Lawrence Faulkner DO    Referred By: Baylee Chaudhary APRN    CC: PAF, VT    Problem List:  1. Paroxsymal atrial fibrillation  a. CHADS2Vasc=3, on eliquis  b. 30 day event monitor 09/2022: Predominantly sinus heart rate ranging from  bpm, frequent PACs and PVCs with short runs of A. fib, ventricular bigeminy.  5 beat and 25 beat run of VT, rate around 200 bpm.  Patient reported symptoms of heart fluttering, dizziness, chest pain correlated with normal sinus rhythm. 3% PVCs, 7% PACs  2. Ventricular tachycardia  a. 30 day event monitor 09/2022:  5 beat and 25 beat run of VT, rate around 200 bpm.   b. Echo 10/19/2022: EF 51-55%, mild to moderate septal asymmetric hypertrophy, grade I diastolic dysfunction  3. CAD, Chest pain  a. Stress test 08/26/2022: no evidence of ischemia, low risk study  b. LHC 10/19/2022: moderate CAD, no stents placed, aggressive medical management and risk factor stratification   c. ED visit [facility] for CP 11/01/2022, negative troponin, negative work up  4. Hypertension  5. Asthma  6. Former tobacco abuse    History of Present Illness:   Ivis Vela is a 67 y.o. female who presents to the electrophysiology clinic for consultation regarding atrial fibrillation and ventricular tachycardia, requested by GAYLE Haskins. She reports her main issue is chest pain for the last 3-4 months, feels like pressure in her mid-lower chest, radiates to back, associated with irregular palpitations/ \"hard heart beats\" and dizziness, presyncope, occasional nausea. No true syncope. Episodes last 3-4 minutes, occur 5-6 times per day, weakness afterwards. Reports constant weakness and fatigue. Reports Hrs into the 40s at home, associated with extreme weakness and fatigue.    RACHELLE- never tested  Etoh- never  HTN- typically " 140s/90s, labile some hypotension lately.    Past Surgical History:   Procedure Laterality Date   • CARDIAC CATHETERIZATION N/A 10/19/2022    Procedure: Left Heart Cath;  Surgeon: Piotr Edwards DO;  Location: [facility] CATH INVASIVE LOCATION;  Service: Cardiology;  Laterality: N/A;   • CARPAL TUNNEL RELEASE Bilateral    • CATARACT EXTRACTION     • CHOLECYSTECTOMY     • ESOPHAGOSCOPY W/ DILATION     • HYSTERECTOMY         Family History   Problem Relation Age of Onset   • Hypertension Mother    • Heart disease Father    • Hypertension Father    • Hypertension Sister    • Heart disease Brother    • Hypertension Brother    • Diabetes Brother    • Breast cancer Paternal Cousin    • Breast cancer Maternal Cousin        Social History     Socioeconomic History   • Marital status:    Tobacco Use   • Smoking status: Former     Types: Electronic Cigarette, Cigarettes     Quit date:      Years since quittin.8   • Smokeless tobacco: Never   Substance and Sexual Activity   • Alcohol use: No   • Drug use: No   • Sexual activity: Defer         Current Outpatient Medications:   •  alendronate (FOSAMAX) 70 MG tablet, Take 70 mg by mouth Every 7 (Seven) Days., Disp: , Rfl:   •  amitriptyline (ELAVIL) 25 MG tablet, Take 1 tablet by mouth Every Night., Disp: , Rfl:   •  apixaban (ELIQUIS) 5 MG tablet tablet, Take 1 tablet by mouth 2 (Two) Times a Day., Disp: 28 tablet, Rfl: 0  •  aspirin 81 MG EC tablet, Take 1 tablet by mouth Daily., Disp: , Rfl:   •  atorvastatin (LIPITOR) 80 MG tablet, Take 1 tablet by mouth Every Night for 30 days., Disp: 30 tablet, Rfl: 0  •  isosorbide mononitrate (IMDUR) 60 MG 24 hr tablet, Take 1 tablet by mouth Daily., Disp: 30 tablet, Rfl: 5  •  losartan-hydrochlorothiazide (HYZAAR) 100-12.5 MG per tablet, Take 0.5 tablets by mouth Daily., Disp: , Rfl:   •  metoprolol tartrate (LOPRESSOR) 25 MG tablet, Take 0.5 tablets by mouth 2 (Two) Times a Day for 30 days., Disp: 30 tablet, Rfl: 0  •   "nitroglycerin (NITROSTAT) 0.4 MG SL tablet, 1 under the tongue as needed for angina, may repeat q5mins for up three doses, Disp: 25 tablet, Rfl: 2  •  pantoprazole (PROTONIX) 40 MG EC tablet, Take 40 mg by mouth Daily., Disp: , Rfl:   •  flecainide (TAMBOCOR) 50 MG tablet, Take 1 tablet by mouth 2 (Two) Times a Day., Disp: 60 tablet, Rfl: 11    Allergies:   No Known Allergies    Review of Systems:  Review of Systems   Constitutional: Positive for malaise/fatigue.   Cardiovascular: Positive for chest pain, irregular heartbeat, near-syncope and palpitations. Negative for dyspnea on exertion, leg swelling, orthopnea, paroxysmal nocturnal dyspnea and syncope.   Respiratory: Negative for shortness of breath.    Neurological: Positive for dizziness and light-headedness.        Vital Signs: Blood pressure 140/80, pulse 77, height 162.6 cm (64\"), weight 79.8 kg (176 lb), SpO2 98 %.    Physical Exam  Vitals reviewed.   Cardiovascular:      Rate and Rhythm: Normal rate. Rhythm irregular.      Heart sounds: Normal heart sounds.   Pulmonary:      Effort: Pulmonary effort is normal.      Breath sounds: Normal breath sounds.   Musculoskeletal:      Right lower leg: No edema.      Left lower leg: No edema.   Neurological:      Mental Status: She is alert and oriented to person, place, and time.   Psychiatric:         Behavior: Behavior is cooperative.         Lab Results   Component Value Date    GLUCOSE 120 (H) 11/01/2022    CALCIUM 8.9 11/01/2022     11/01/2022    K 3.7 11/01/2022    CO2 25.2 11/01/2022     11/01/2022    BUN 12 11/01/2022    CREATININE 0.91 11/01/2022    EGFRIFNONA 75 07/24/2020    BCR 13.2 11/01/2022    ANIONGAP 12.8 11/01/2022     Lab Results   Component Value Date    WBC 9.23 11/01/2022    HGB 14.6 11/01/2022    HCT 44.2 11/01/2022    MCV 91.3 11/01/2022     11/01/2022     Lab Results   Component Value Date    INR 1.04 11/01/2022    INR 1.05 10/19/2022    INR 1.03 10/18/2022    PROTIME 13.9 " 11/01/2022    PROTIME 13.9 10/19/2022    PROTIME 13.7 10/18/2022     Lab Results   Component Value Date    TSH 2.230 10/18/2022        Results for orders placed during the hospital encounter of 10/18/22    Adult Transthoracic Echo Complete w/ Color, Spectral and Contrast if necessary per protocol    Interpretation Summary  •  Left ventricular ejection fraction appears to be 51 - 55%.  •  Left ventricular wall thickness is consistent with mild to moderate septal asymmetric hypertrophy.  •  Left ventricular diastolic function is consistent with (grade I) impaired relaxation.  •  Estimated right ventricular systolic pressure from tricuspid regurgitation is normal (<35 mmHg).      I personally viewed and interpreted the patient's EKG/Telemetry/lab data.              ECG 12 Lead    Date/Time: 11/4/2022 10:39 AM  Performed by: Siri Gonzalez APRN  Authorized by: Siri Gonzalez APRN   Comparison: compared with previous ECG from 11/1/2022  Comparison to previous ECG: Sinus tachycardia  Rhythm: sinus rhythm  Ectopy: atrial premature contractions  BPM: 75  Comments: QRS 96, .  ST/T wave abnormality            Ivis Vela  reports that she quit smoking about 8 years ago. Her smoking use included electronic cigarette and cigarettes. She has never used smokeless tobacco.    Assessment & Plan    1. Paroxysmal atrial fibrillation (HCC)  -Recent event monitor in September 2022 revealing short runs of A. fib.  She does describe episodes of palpitations associated with dizziness, presyncope, chest pain.  I do not think her episodes of atrial fibrillation are frequent enough at the current time to explain her symptoms.  We did discuss the importance of remaining on anticoagulation.  We also discussed the need to continue to monitor at some point we may need to proceed with more aggressive therapy such as ablation of the atrial fibrillation.  As below, think the majority of her symptoms are related to premature beats.  We  discussed different options for this.  She is on a low-dose of metoprolol currently.  We will go ahead and start her on a low-dose of flecainide 50 mg twice daily and see if this helps her symptoms.  She will get an EKG in the next 72 hours to recheck.  Of note, she does have stable moderate coronary disease.  In the absence of ischemia, flecainide should be safe.  There are multiple newer studies showing that in stable coronary disease flecainide is a reasonable option.    2. PVC's (premature ventricular contractions)  -3% PVC burden noted on monitor    3. Premature atrial contractions  -7% PAC burden noted on monitor    4. VT (ventricular tachycardia)  - 5 beat and 25 beat run of VT, rate around 200 bpm.  On further review, the 25 beat run has a strange morphology and is associated with other artifact.  I think this is more significant with artifact than true ventricular tachycardia.  We will attempt to see if we can get more tracings of this from the monitor company to better assess this.           Follow Up:  Return in about 6 months (around 5/4/2023).    Scribed for Deshaun Alonso MD by Siri ARAUJO. 11/4/2022  11:02 EDT    I, Deshaun Alonso MD, personally performed the services described in this documentation as scribed by the above named individual in my presence, and it is both accurate and complete.  11/4/2022  11:02 EDT

## 2022-11-04 ENCOUNTER — OFFICE VISIT (OUTPATIENT)
Dept: CARDIOLOGY | Facility: CLINIC | Age: 67
End: 2022-11-04

## 2022-11-04 VITALS
WEIGHT: 176 LBS | OXYGEN SATURATION: 98 % | HEART RATE: 77 BPM | DIASTOLIC BLOOD PRESSURE: 80 MMHG | SYSTOLIC BLOOD PRESSURE: 140 MMHG | BODY MASS INDEX: 30.05 KG/M2 | HEIGHT: 64 IN

## 2022-11-04 DIAGNOSIS — I49.1 PREMATURE ATRIAL CONTRACTIONS: ICD-10-CM

## 2022-11-04 DIAGNOSIS — I48.0 PAROXYSMAL ATRIAL FIBRILLATION: Primary | ICD-10-CM

## 2022-11-04 DIAGNOSIS — I49.3 PVC'S (PREMATURE VENTRICULAR CONTRACTIONS): ICD-10-CM

## 2022-11-04 DIAGNOSIS — I47.20 VT (VENTRICULAR TACHYCARDIA): ICD-10-CM

## 2022-11-04 PROCEDURE — 93000 ELECTROCARDIOGRAM COMPLETE: CPT | Performed by: NURSE PRACTITIONER

## 2022-11-04 PROCEDURE — 99214 OFFICE O/P EST MOD 30 MIN: CPT | Performed by: STUDENT IN AN ORGANIZED HEALTH CARE EDUCATION/TRAINING PROGRAM

## 2022-11-04 RX ORDER — FLECAINIDE ACETATE 50 MG/1
50 TABLET ORAL 2 TIMES DAILY
Qty: 60 TABLET | Refills: 11 | Status: SHIPPED | OUTPATIENT
Start: 2022-11-04

## 2022-11-08 ENCOUNTER — TELEPHONE (OUTPATIENT)
Dept: CARDIOLOGY | Facility: CLINIC | Age: 67
End: 2022-11-08

## 2022-11-10 ENCOUNTER — HOSPITAL ENCOUNTER (OUTPATIENT)
Dept: RESPIRATORY THERAPY | Facility: HOSPITAL | Age: 67
Discharge: HOME | End: 2022-11-10

## 2022-11-10 ENCOUNTER — TELEPHONE (OUTPATIENT)
Dept: CARDIOLOGY | Facility: CLINIC | Age: 67
End: 2022-11-10

## 2022-11-10 DIAGNOSIS — I48.0 PAROXYSMAL ATRIAL FIBRILLATION: ICD-10-CM

## 2022-11-10 LAB
QT INTERVAL: 392 MS
QTC INTERVAL: 420 MS

## 2022-11-10 PROCEDURE — 93010 ELECTROCARDIOGRAM REPORT: CPT | Performed by: INTERNAL MEDICINE

## 2022-11-10 PROCEDURE — 93005 ELECTROCARDIOGRAM TRACING: CPT | Performed by: STUDENT IN AN ORGANIZED HEALTH CARE EDUCATION/TRAINING PROGRAM

## 2022-11-12 DIAGNOSIS — I48.0 PAROXYSMAL ATRIAL FIBRILLATION: ICD-10-CM

## 2022-11-14 RX ORDER — APIXABAN 5 MG/1
TABLET, FILM COATED ORAL
Qty: 60 TABLET | Refills: 0 | Status: SHIPPED | OUTPATIENT
Start: 2022-11-14 | End: 2022-12-09 | Stop reason: SDUPTHER

## 2022-11-29 ENCOUNTER — OFFICE VISIT (OUTPATIENT)
Dept: CARDIOLOGY | Facility: CLINIC | Age: 67
End: 2022-11-29

## 2022-11-29 VITALS
DIASTOLIC BLOOD PRESSURE: 65 MMHG | WEIGHT: 180.2 LBS | HEIGHT: 64 IN | BODY MASS INDEX: 30.77 KG/M2 | SYSTOLIC BLOOD PRESSURE: 111 MMHG | OXYGEN SATURATION: 97 % | HEART RATE: 58 BPM

## 2022-11-29 DIAGNOSIS — I25.10 ASCVD (ARTERIOSCLEROTIC CARDIOVASCULAR DISEASE): ICD-10-CM

## 2022-11-29 DIAGNOSIS — I49.1 PREMATURE ATRIAL CONTRACTIONS: ICD-10-CM

## 2022-11-29 DIAGNOSIS — I48.0 PAROXYSMAL ATRIAL FIBRILLATION: Primary | ICD-10-CM

## 2022-11-29 DIAGNOSIS — I10 ESSENTIAL HYPERTENSION: ICD-10-CM

## 2022-11-29 PROCEDURE — 99213 OFFICE O/P EST LOW 20 MIN: CPT | Performed by: NURSE PRACTITIONER

## 2022-11-29 RX ORDER — ATORVASTATIN CALCIUM 80 MG/1
80 TABLET, FILM COATED ORAL DAILY
Qty: 90 TABLET | Refills: 3 | Status: SHIPPED | OUTPATIENT
Start: 2022-11-29

## 2022-11-29 NOTE — PROGRESS NOTES
Lawrence Faulkner DO  Ivis Vela  1955 11/29/2022    Patient Active Problem List   Diagnosis   • Hypertension   • Asthma   • Osteoporosis   • Left arm pain   • Closed nondisplaced fracture of head of radius with routine healing   • Palpitations   • Dizziness and giddiness   • Unstable angina (HCC)   • Paroxysmal atrial fibrillation (HCC)   • PVC's (premature ventricular contractions)   • Premature atrial contractions   • VT (ventricular tachycardia)       Dear Lawrence Faulkner DO:    Subjective     Chief Complaint   Patient presents with   • Follow-up     ROUTINE           History of Present Illness:    Ivis Vela is a 67 y.o. female with a past medical history of hypertension, nonobstructive CAD, and recent event monitor revealing frequent PAC's, PVC's, short runs of atrial fibrillation, and ventricular tachycardia. She presents today for cardiology follow up. Since her last visit, was evaluated by EP and flecainide was prescribed. She has been tolerating this medication well. She states since starting the medication she is feeling much better. Her chest pains have resolved. Palpitations have improved significantly. She denies any bleeding issues with Eliquis. She did have a conjunctival hemorrhage of her left eye but was evaluated by her ophthalmologist and reports this was spontaneous and has healed.          No Known Allergies:      Current Outpatient Medications:   •  alendronate (FOSAMAX) 70 MG tablet, Take 70 mg by mouth Every 7 (Seven) Days., Disp: , Rfl:   •  amitriptyline (ELAVIL) 25 MG tablet, Take 1 tablet by mouth Every Night., Disp: , Rfl:   •  aspirin 81 MG EC tablet, Take 1 tablet by mouth Daily., Disp: , Rfl:   •  Eliquis 5 MG tablet tablet, Take 1 tablet by mouth twice daily, Disp: 60 tablet, Rfl: 0  •  flecainide (TAMBOCOR) 50 MG tablet, Take 1 tablet by mouth 2 (Two) Times a Day., Disp: 60 tablet, Rfl: 11  •  isosorbide mononitrate (IMDUR) 60 MG 24 hr tablet, Take 1 tablet by mouth Daily.,  "Disp: 30 tablet, Rfl: 5  •  losartan-hydrochlorothiazide (HYZAAR) 100-12.5 MG per tablet, Take 0.5 tablets by mouth Daily., Disp: , Rfl:   •  metoprolol tartrate (LOPRESSOR) 25 MG tablet, Take 0.5 tablets by mouth 2 (Two) Times a Day for 30 days., Disp: 30 tablet, Rfl: 0  •  nitroglycerin (NITROSTAT) 0.4 MG SL tablet, 1 under the tongue as needed for angina, may repeat q5mins for up three doses, Disp: 25 tablet, Rfl: 2  •  pantoprazole (PROTONIX) 40 MG EC tablet, Take 40 mg by mouth Daily., Disp: , Rfl:   •  atorvastatin (LIPITOR) 80 MG tablet, Take 1 tablet by mouth Daily., Disp: 90 tablet, Rfl: 3      The following portions of the patient's history were reviewed and updated as appropriate: allergies, current medications, past family history, past medical history, past social history, past surgical history and problem list.    Social History     Tobacco Use   • Smoking status: Former     Types: Electronic Cigarette, Cigarettes     Quit date:      Years since quittin.9   • Smokeless tobacco: Never   Substance Use Topics   • Alcohol use: No   • Drug use: No       Review of Systems   Constitutional: Negative for decreased appetite and malaise/fatigue.   Cardiovascular: Positive for palpitations. Negative for chest pain and dyspnea on exertion.   Respiratory: Negative for cough and shortness of breath.        Objective   Vitals:    22 1113   BP: 111/65   Pulse: 58   SpO2: 97%   Weight: 81.7 kg (180 lb 3.2 oz)   Height: 162.6 cm (64\")     Body mass index is 30.93 kg/m².        Vitals reviewed.   Constitutional:       Appearance: Healthy appearance. Well-developed and not in distress.   HENT:      Head: Normocephalic and atraumatic.   Neck:      Vascular: No JVD.   Pulmonary:      Effort: Pulmonary effort is normal.      Breath sounds: Normal breath sounds. No wheezing. No rales.   Cardiovascular:      Normal rate. Regularly irregular rhythm.      Murmurs: There is no murmur.      . No S3 and S4 gallop. "   Edema:     Peripheral edema absent.   Abdominal:      General: Bowel sounds are normal.      Palpations: Abdomen is soft.   Skin:     General: Skin is warm and dry.   Neurological:      Mental Status: Alert, oriented to person, place, and time and oriented to person, place and time.   Psychiatric:         Mood and Affect: Mood normal.         Behavior: Behavior normal.         Lab Results   Component Value Date     11/01/2022    K 3.7 11/01/2022     11/01/2022    CO2 25.2 11/01/2022    BUN 12 11/01/2022    CREATININE 0.91 11/01/2022    GLUCOSE 120 (H) 11/01/2022    CALCIUM 8.9 11/01/2022    AST 13 11/01/2022    ALT 13 11/01/2022    ALKPHOS 92 11/01/2022     No results found for: CKTOTAL  Lab Results   Component Value Date    WBC 9.23 11/01/2022    HGB 14.6 11/01/2022    HCT 44.2 11/01/2022     11/01/2022     Lab Results   Component Value Date    INR 1.04 11/01/2022    INR 1.05 10/19/2022    INR 1.03 10/18/2022     Lab Results   Component Value Date    MG 2.3 10/19/2022     Lab Results   Component Value Date    TSH 2.230 10/18/2022    TRIG 96 10/18/2022    HDL 55 10/18/2022    LDL 85 10/18/2022      No results found for: BNP        Procedures      Assessment & Plan    Diagnosis Plan   1. Paroxysmal atrial fibrillation (HCC)        2. Premature atrial contractions        3. ASCVD (arteriosclerotic cardiovascular disease)  atorvastatin (LIPITOR) 80 MG tablet      4. Essential hypertension                     Recommendations:    1. Paroxysmal atrial fibrillation/PAC's - continue flecainide, Eliquis, and metoprolol.  2. ASCVD - nonobstructive. Continue low dose aspirin, atorvastatin, metoprolol and isosorbide. Will order a lipid panel at next visit.  3. Essential hypertension - well controlled.  4. Follow up in 3 to 4 months or sooner if needed.         Return in about 4 months (around 3/29/2023) for Recheck.    As always, I appreciate very much the opportunity to participate in the cardiovascular  care of your patients.      With Best Regards,    GAYLE Haskins

## 2022-12-09 DIAGNOSIS — I48.0 PAROXYSMAL ATRIAL FIBRILLATION: ICD-10-CM

## 2022-12-09 NOTE — TELEPHONE ENCOUNTER
Caller: Ivis Vela    Relationship: Self    Best call back number: [phone]    Requested Prescriptions:   Requested Prescriptions     Pending Prescriptions Disp Refills   • apixaban (Eliquis) 5 MG tablet tablet 60 tablet 0     Sig: Take 1 tablet by mouth 2 (Two) Times a Day.        Pharmacy where request should be sent: [facility] - [place] LOZ[facility] - [address] [facility] AT PORTAL TO REGISTERED [facility] SITES - [phone]  - [phone] FX     Additional details provided by patient: PATIENT STATED SHE HAD A SMALLER SCRIPT OF ELIQUIS SENT TO [facility] BUT WAS SUPPOSE TO HAVE ANOTHER REFILL SENT TO [facility].     Does the patient have less than a 3 day supply:  [] Yes  [x] No    Would you like a call back once the refill request has been completed: [] Yes [x] No    If the office needs to give you a call back, can they leave a voicemail: [] Yes [x] No    Clifford Sanchez Rep   12/09/22 08:58 EST

## 2023-01-23 DIAGNOSIS — Z82.49 FAMILY HISTORY OF PREMATURE CORONARY ARTERY DISEASE: ICD-10-CM

## 2023-01-23 DIAGNOSIS — I10 ESSENTIAL HYPERTENSION: ICD-10-CM

## 2023-01-23 DIAGNOSIS — R07.2 PRECORDIAL PAIN: ICD-10-CM

## 2023-01-23 RX ORDER — ISOSORBIDE MONONITRATE 60 MG/1
60 TABLET, EXTENDED RELEASE ORAL DAILY
Qty: 30 TABLET | Refills: 5 | Status: SHIPPED | OUTPATIENT
Start: 2023-01-23

## 2023-01-23 NOTE — TELEPHONE ENCOUNTER
Caller: Ivis Vela    Relationship: Self    Best call back number: [phone]    Requested Prescriptions:   Requested Prescriptions     Pending Prescriptions Disp Refills   • isosorbide mononitrate (IMDUR) 60 MG 24 hr tablet 30 tablet 5     Sig: Take 1 tablet by mouth Daily.        [facility] - [address] [facility] AT PORTAL TO [facility] - [phone] [facility] [phone] FX         Does the patient have less than a 3 day supply:  [x] Yes  [] No    Would you like a call back once the refill request has been completed: [x] Yes [x] No    If the office needs to give you a call back, can they leave a voicemail: [] Yes [x] No    Clifford Jenkins Rep   01/23/23 10:36 EST

## 2023-02-03 ENCOUNTER — HOSPITAL ENCOUNTER (OUTPATIENT)
Dept: BONE DENSITY | Facility: HOSPITAL | Age: 68
Discharge: HOME | End: 2023-02-03
Payer: MEDICARE

## 2023-02-03 ENCOUNTER — HOSPITAL ENCOUNTER (OUTPATIENT)
Dept: MAMMOGRAPHY | Facility: HOSPITAL | Age: 68
Discharge: HOME | End: 2023-02-03
Payer: MEDICARE

## 2023-02-03 DIAGNOSIS — Z78.0 POSTMENOPAUSAL: ICD-10-CM

## 2023-02-03 DIAGNOSIS — Z12.31 VISIT FOR SCREENING MAMMOGRAM: ICD-10-CM

## 2023-02-03 PROCEDURE — 77080 DXA BONE DENSITY AXIAL: CPT | Performed by: RADIOLOGY

## 2023-02-03 PROCEDURE — 77080 DXA BONE DENSITY AXIAL: CPT

## 2023-02-07 ENCOUNTER — TELEPHONE (OUTPATIENT)
Dept: CARDIOLOGY | Facility: CLINIC | Age: 68
End: 2023-02-07
Payer: MEDICARE

## 2023-02-07 DIAGNOSIS — I48.0 PAROXYSMAL ATRIAL FIBRILLATION: ICD-10-CM

## 2023-02-07 NOTE — TELEPHONE ENCOUNTER
Caller: Ivis Vela    Relationship: Self    Best call back number:     What is the best time to reach you: ANYTIME    What was the call regarding: PATIENT HAS BEEN COMPLETELY OUT OF ELIIS FOR 3 DAYS AND GET THEM THROUGH MAIL ORDER PHARMACY. SHE CALLED THEM YESTERDAY AND WAS ADVISED THAT IT WOULD BE 6 OR 7 DAYS BEFORE SHE WOULD GET THE MEDICATION. PATIENT WOULD LIKE TO KNOW IF OFFICE CAN SEND IN A RX TO LOCAL [facility] TO LAST HER UNTIL SHE GETS MEDICATION.     [facility] [place]     Do you require a callback: YES

## 2023-02-14 NOTE — TELEPHONE ENCOUNTER
Caller: Ivis Vela    Relationship: Self    Best call back number: [phone]    What is the best time to reach you: ANYTIME     Who are you requesting to speak with (clinical staff, provider,  specific staff member): ANYONE     What was the call regarding: PATIENT OUT OF ELIQUIS 5MG. WAS ADVISED BY [facility] TO HAVE PROVIDER SEND OVER AN ORDER FOR A 30 DAY SUPPLY. REPORTS THE [facility]ILSERVICE HAS NOT BEEN ABLE TO REFILL PRESCRIPTION.     Do you require a callback: YES

## 2023-03-10 ENCOUNTER — HOSPITAL ENCOUNTER (OUTPATIENT)
Dept: MAMMOGRAPHY | Facility: HOSPITAL | Age: 68
Discharge: HOME | End: 2023-03-10
Admitting: FAMILY MEDICINE
Payer: MEDICARE

## 2023-03-10 PROCEDURE — 77067 SCR MAMMO BI INCL CAD: CPT

## 2023-03-10 PROCEDURE — 77063 BREAST TOMOSYNTHESIS BI: CPT | Performed by: RADIOLOGY

## 2023-03-10 PROCEDURE — 77067 SCR MAMMO BI INCL CAD: CPT | Performed by: RADIOLOGY

## 2023-03-10 PROCEDURE — 77063 BREAST TOMOSYNTHESIS BI: CPT

## 2023-03-29 ENCOUNTER — OFFICE VISIT (OUTPATIENT)
Dept: CARDIOLOGY | Facility: CLINIC | Age: 68
End: 2023-03-29
Payer: MEDICARE

## 2023-03-29 VITALS
HEIGHT: 64 IN | SYSTOLIC BLOOD PRESSURE: 138 MMHG | WEIGHT: 184 LBS | BODY MASS INDEX: 31.41 KG/M2 | DIASTOLIC BLOOD PRESSURE: 77 MMHG | OXYGEN SATURATION: 96 % | HEART RATE: 67 BPM

## 2023-03-29 DIAGNOSIS — I48.0 PAROXYSMAL ATRIAL FIBRILLATION: Primary | ICD-10-CM

## 2023-03-29 PROCEDURE — 3075F SYST BP GE 130 - 139MM HG: CPT | Performed by: PHYSICIAN ASSISTANT

## 2023-03-29 PROCEDURE — 99214 OFFICE O/P EST MOD 30 MIN: CPT | Performed by: PHYSICIAN ASSISTANT

## 2023-03-29 PROCEDURE — 3078F DIAST BP <80 MM HG: CPT | Performed by: PHYSICIAN ASSISTANT

## 2023-03-29 NOTE — PROGRESS NOTES
Lawrence Faulkner DO  Ivis Vela  1955 03/29/2023    Patient Active Problem List   Diagnosis   • Hypertension   • Asthma   • Osteoporosis   • Left arm pain   • Closed nondisplaced fracture of head of radius with routine healing   • Palpitations   • Dizziness and giddiness   • Unstable angina (HCC)   • Paroxysmal atrial fibrillation (HCC)   • PVC's (premature ventricular contractions)   • Premature atrial contractions   • VT (ventricular tachycardia)       Dear Lawrence Faulkner DO:    Subjective     History of Present Illness:    Chief Complaint   Patient presents with   • Med Management     Verbal.    • Dizziness   • Palpitations       Ivis Vela is a pleasant 67 y.o. female with a past medical history significant for nonobstructive CAD, frequent PACs/PVCs, paroxysmal atrial fibrillation, and short wide-complex tachycardia now following with electrophysiology.  She comes in today for cardiology follow-up.    Ivis reports she has been having some pain/palpitations and her left shoulder and left-sided neck.  She also feel her heart flutter/beat rapidly particularly at night or at rest and reports that the symptoms have been occurring on a near daily basis sometimes several times a day.  However all these episodes do only last for 1 to 2 seconds in duration.  She denies any adverse drug reaction from flecainide and has been taking Eliquis without any bleeding issues    No Known Allergies:      Current Outpatient Medications:   •  alendronate (FOSAMAX) 70 MG tablet, Take 1 tablet by mouth Every 7 (Seven) Days., Disp: , Rfl:   •  amitriptyline (ELAVIL) 25 MG tablet, Take 1 tablet by mouth Every Night., Disp: , Rfl:   •  apixaban (ELIQUIS) 5 MG tablet tablet, Take 1 tablet by mouth 2 (Two) Times a Day., Disp: 14 tablet, Rfl: 0  •  apixaban (Eliquis) 5 MG tablet tablet, Take 1 tablet by mouth 2 (Two) Times a Day., Disp: 60 tablet, Rfl: 0  •  aspirin 81 MG EC tablet, Take 1 tablet by mouth Daily., Disp: , Rfl:   •   "atorvastatin (LIPITOR) 80 MG tablet, Take 1 tablet by mouth Daily., Disp: 90 tablet, Rfl: 3  •  flecainide (TAMBOCOR) 50 MG tablet, Take 1 tablet by mouth 2 (Two) Times a Day., Disp: 60 tablet, Rfl: 11  •  isosorbide mononitrate (IMDUR) 60 MG 24 hr tablet, Take 1 tablet by mouth Daily., Disp: 30 tablet, Rfl: 5  •  losartan-hydrochlorothiazide (HYZAAR) 100-12.5 MG per tablet, Take 0.5 tablets by mouth Daily., Disp: , Rfl:   •  nitroglycerin (NITROSTAT) 0.4 MG SL tablet, 1 under the tongue as needed for angina, may repeat q5mins for up three doses, Disp: 25 tablet, Rfl: 2  •  pantoprazole (PROTONIX) 40 MG EC tablet, Take 1 tablet by mouth Daily., Disp: , Rfl:   •  metoprolol tartrate (LOPRESSOR) 25 MG tablet, Take 0.5 tablets by mouth 2 (Two) Times a Day for 30 days., Disp: 30 tablet, Rfl: 0    The following portions of the patient's history were reviewed and updated as appropriate: allergies, current medications, past family history, past medical history, past social history, past surgical history and problem list.    Social History     Tobacco Use   • Smoking status: Former     Types: Electronic Cigarette, Cigarettes     Quit date:      Years since quittin.2   • Smokeless tobacco: Never   Substance Use Topics   • Alcohol use: No   • Drug use: No         Objective   Vitals:    23 1005   BP: 138/77   BP Location: Right arm   Patient Position: Sitting   Cuff Size: Adult   Pulse: 67   SpO2: 96%   Weight: 83.5 kg (184 lb)   Height: 162.6 cm (64\")     Body mass index is 31.58 kg/m².    Constitutional:       General: Not in acute distress.     Appearance: Healthy appearance. Well-developed and not in distress. Not diaphoretic.   Eyes:      Conjunctiva/sclera: Conjunctivae normal.      Pupils: Pupils are equal, round, and reactive to light.   HENT:      Head: Normocephalic and atraumatic.   Neck:      Vascular: No carotid bruit or JVD.   Pulmonary:      Effort: Pulmonary effort is normal. No respiratory " distress.      Breath sounds: Normal breath sounds.   Cardiovascular:      Normal rate. Irregular rhythm.      Murmurs: There is a grade 2/4 high frequency blowing decrescendo, early diastolic murmur at the URSB, radiating to the apex.   Edema:     Peripheral edema absent.   Skin:     General: Skin is cool.   Neurological:      Mental Status: Alert, oriented to person, place, and time and oriented to person, place and time.         Lab Results   Component Value Date     11/01/2022    K 3.7 11/01/2022     11/01/2022    CO2 25.2 11/01/2022    BUN 12 11/01/2022    CREATININE 0.91 11/01/2022    GLUCOSE 120 (H) 11/01/2022    CALCIUM 8.9 11/01/2022    AST 13 11/01/2022    ALT 13 11/01/2022    ALKPHOS 92 11/01/2022     No results found for: CKTOTAL  Lab Results   Component Value Date    WBC 9.23 11/01/2022    HGB 14.6 11/01/2022    HCT 44.2 11/01/2022     11/01/2022     Lab Results   Component Value Date    INR 1.04 11/01/2022    INR 1.05 10/19/2022    INR 1.03 10/18/2022     Lab Results   Component Value Date    MG 2.3 10/19/2022     Lab Results   Component Value Date    TSH 2.230 10/18/2022    TRIG 96 10/18/2022    HDL 55 10/18/2022    LDL 85 10/18/2022      No results found for: BNP    During this visit the following were done:  Labs Reviewed []    Labs Ordered []    Radiology Reports Reviewed []    Radiology Ordered []    PCP Records Reviewed []    Referring Provider Records Reviewed []    ER Records Reviewed []    Hospital Records Reviewed []    History Obtained From Family []    Radiology Images Reviewed []    Other Reviewed []    Records Requested []       Procedures    Assessment & Plan    Diagnosis Plan   1. Paroxysmal atrial fibrillation (HCC)  Holter Monitor - 72 Hour Up To 15 Days               Recommendations:  1. Paroxysmal atrial fibrillation/PVCs  a. I will go ahead and repeat Holter monitor since she has been started on flecainide and monitor her dysrhythmias if she continues to have  PVCs/atrial fibrillation we will consider increasing dose of flecainide.  b. Continue Eliquis for anticoagulation she denies any bleeding issues    Return in about 6 weeks (around 5/10/2023).    As always, I appreciate very much the opportunity to participate in the cardiovascular care of your patients.      With Best Regards,    Dieter Galloway PA-C

## 2023-04-05 ENCOUNTER — TELEPHONE (OUTPATIENT)
Dept: CARDIOLOGY | Facility: CLINIC | Age: 68
End: 2023-04-05
Payer: MEDICARE

## 2023-04-05 NOTE — TELEPHONE ENCOUNTER
----- Message from Dieter Galloway PA-C sent at 3/29/2023 10:37 AM EDT -----  Can we get recent labs from pcp?       REQUESTED

## 2023-04-24 ENCOUNTER — HOSPITAL ENCOUNTER (EMERGENCY)
Facility: HOSPITAL | Age: 68
Discharge: HOME | End: 2023-04-24
Attending: STUDENT IN AN ORGANIZED HEALTH CARE EDUCATION/TRAINING PROGRAM | Admitting: STUDENT IN AN ORGANIZED HEALTH CARE EDUCATION/TRAINING PROGRAM
Payer: MEDICARE

## 2023-04-24 ENCOUNTER — TELEPHONE (OUTPATIENT)
Dept: CARDIOLOGY | Facility: CLINIC | Age: 68
End: 2023-04-24
Payer: MEDICARE

## 2023-04-24 ENCOUNTER — APPOINTMENT (OUTPATIENT)
Dept: GENERAL RADIOLOGY | Facility: HOSPITAL | Age: 68
End: 2023-04-24
Payer: MEDICARE

## 2023-04-24 VITALS
SYSTOLIC BLOOD PRESSURE: 131 MMHG | HEART RATE: 97 BPM | RESPIRATION RATE: 18 BRPM | TEMPERATURE: 96.9 F | OXYGEN SATURATION: 96 % | WEIGHT: 184 LBS | BODY MASS INDEX: 31.41 KG/M2 | DIASTOLIC BLOOD PRESSURE: 74 MMHG | HEIGHT: 64 IN

## 2023-04-24 DIAGNOSIS — R07.89 ATYPICAL CHEST PAIN: Primary | ICD-10-CM

## 2023-04-24 LAB
ALBUMIN SERPL-MCNC: 4 G/DL (ref 3.5–5.2)
ALBUMIN/GLOB SERPL: 1.3 G/DL
ALP SERPL-CCNC: 93 U/L (ref 39–117)
ALT SERPL W P-5'-P-CCNC: 13 U/L (ref 1–33)
ANION GAP SERPL CALCULATED.3IONS-SCNC: 11.6 MMOL/L (ref 5–15)
AST SERPL-CCNC: 14 U/L (ref 1–32)
BASOPHILS # BLD AUTO: 0.02 10*3/MM3 (ref 0–0.2)
BASOPHILS NFR BLD AUTO: 0.2 % (ref 0–1.5)
BILIRUB SERPL-MCNC: 0.5 MG/DL (ref 0–1.2)
BUN SERPL-MCNC: 11 MG/DL (ref 8–23)
BUN/CREAT SERPL: 11.6 (ref 7–25)
CALCIUM SPEC-SCNC: 9.4 MG/DL (ref 8.6–10.5)
CHLORIDE SERPL-SCNC: 102 MMOL/L (ref 98–107)
CO2 SERPL-SCNC: 28.4 MMOL/L (ref 22–29)
CREAT SERPL-MCNC: 0.95 MG/DL (ref 0.57–1)
DEPRECATED RDW RBC AUTO: 46.2 FL (ref 37–54)
EGFRCR SERPLBLD CKD-EPI 2021: 66.2 ML/MIN/1.73
EOSINOPHIL # BLD AUTO: 0.07 10*3/MM3 (ref 0–0.4)
EOSINOPHIL NFR BLD AUTO: 0.8 % (ref 0.3–6.2)
ERYTHROCYTE [DISTWIDTH] IN BLOOD BY AUTOMATED COUNT: 13.6 % (ref 12.3–15.4)
FLUAV RNA RESP QL NAA+PROBE: NOT DETECTED
FLUBV RNA ISLT QL NAA+PROBE: NOT DETECTED
GEN 5 2HR TROPONIN T REFLEX: 11 NG/L
GLOBULIN UR ELPH-MCNC: 3.1 GM/DL
GLUCOSE SERPL-MCNC: 121 MG/DL (ref 65–99)
HCT VFR BLD AUTO: 46.2 % (ref 34–46.6)
HGB BLD-MCNC: 14.9 G/DL (ref 12–15.9)
HOLD SPECIMEN: NORMAL
HOLD SPECIMEN: NORMAL
IMM GRANULOCYTES # BLD AUTO: 0.03 10*3/MM3 (ref 0–0.05)
IMM GRANULOCYTES NFR BLD AUTO: 0.4 % (ref 0–0.5)
INR PPP: 0.94 (ref 0.9–1.1)
LYMPHOCYTES # BLD AUTO: 0.58 10*3/MM3 (ref 0.7–3.1)
LYMPHOCYTES NFR BLD AUTO: 6.8 % (ref 19.6–45.3)
MCH RBC QN AUTO: 29.9 PG (ref 26.6–33)
MCHC RBC AUTO-ENTMCNC: 32.3 G/DL (ref 31.5–35.7)
MCV RBC AUTO: 92.6 FL (ref 79–97)
MONOCYTES # BLD AUTO: 0.39 10*3/MM3 (ref 0.1–0.9)
MONOCYTES NFR BLD AUTO: 4.6 % (ref 5–12)
NEUTROPHILS NFR BLD AUTO: 7.38 10*3/MM3 (ref 1.7–7)
NEUTROPHILS NFR BLD AUTO: 87.2 % (ref 42.7–76)
NRBC BLD AUTO-RTO: 0 /100 WBC (ref 0–0.2)
PLATELET # BLD AUTO: 293 10*3/MM3 (ref 140–450)
PMV BLD AUTO: 10 FL (ref 6–12)
POTASSIUM SERPL-SCNC: 3.6 MMOL/L (ref 3.5–5.2)
PROT SERPL-MCNC: 7.1 G/DL (ref 6–8.5)
PROTHROMBIN TIME: 13.1 SECONDS (ref 12.1–14.7)
QT INTERVAL: 344 MS
QTC INTERVAL: 439 MS
RBC # BLD AUTO: 4.99 10*6/MM3 (ref 3.77–5.28)
SARS-COV-2 RNA RESP QL NAA+PROBE: NOT DETECTED
SODIUM SERPL-SCNC: 142 MMOL/L (ref 136–145)
TROPONIN T DELTA: 3 NG/L
TROPONIN T SERPL HS-MCNC: 8 NG/L
WBC NRBC COR # BLD: 8.47 10*3/MM3 (ref 3.4–10.8)
WHOLE BLOOD HOLD COAG: NORMAL
WHOLE BLOOD HOLD SPECIMEN: NORMAL

## 2023-04-24 PROCEDURE — 71045 X-RAY EXAM CHEST 1 VIEW: CPT

## 2023-04-24 PROCEDURE — 80053 COMPREHEN METABOLIC PANEL: CPT | Performed by: NURSE PRACTITIONER

## 2023-04-24 PROCEDURE — 25010000002 ONDANSETRON PER 1 MG: Performed by: NURSE PRACTITIONER

## 2023-04-24 PROCEDURE — 84484 ASSAY OF TROPONIN QUANT: CPT | Performed by: NURSE PRACTITIONER

## 2023-04-24 PROCEDURE — 87636 SARSCOV2 & INF A&B AMP PRB: CPT | Performed by: NURSE PRACTITIONER

## 2023-04-24 PROCEDURE — 85025 COMPLETE CBC W/AUTO DIFF WBC: CPT | Performed by: NURSE PRACTITIONER

## 2023-04-24 PROCEDURE — 93010 ELECTROCARDIOGRAM REPORT: CPT | Performed by: INTERNAL MEDICINE

## 2023-04-24 PROCEDURE — 99284 EMERGENCY DEPT VISIT MOD MDM: CPT

## 2023-04-24 PROCEDURE — 36415 COLL VENOUS BLD VENIPUNCTURE: CPT

## 2023-04-24 PROCEDURE — 71045 X-RAY EXAM CHEST 1 VIEW: CPT | Performed by: RADIOLOGY

## 2023-04-24 PROCEDURE — 93005 ELECTROCARDIOGRAM TRACING: CPT | Performed by: NURSE PRACTITIONER

## 2023-04-24 PROCEDURE — 85610 PROTHROMBIN TIME: CPT | Performed by: NURSE PRACTITIONER

## 2023-04-24 PROCEDURE — 96374 THER/PROPH/DIAG INJ IV PUSH: CPT

## 2023-04-24 RX ORDER — ASPIRIN 81 MG/1
324 TABLET, CHEWABLE ORAL ONCE
Status: COMPLETED | OUTPATIENT
Start: 2023-04-24 | End: 2023-04-24

## 2023-04-24 RX ORDER — ONDANSETRON 2 MG/ML
4 INJECTION INTRAMUSCULAR; INTRAVENOUS ONCE
Status: COMPLETED | OUTPATIENT
Start: 2023-04-24 | End: 2023-04-24

## 2023-04-24 RX ORDER — SODIUM CHLORIDE 0.9 % (FLUSH) 0.9 %
10 SYRINGE (ML) INJECTION AS NEEDED
Status: DISCONTINUED | OUTPATIENT
Start: 2023-04-24 | End: 2023-04-24 | Stop reason: HOSPADM

## 2023-04-24 RX ADMIN — ONDANSETRON 4 MG: 2 INJECTION INTRAMUSCULAR; INTRAVENOUS at 17:46

## 2023-04-24 RX ADMIN — ASPIRIN 324 MG: 81 TABLET, CHEWABLE ORAL at 17:36

## 2023-04-24 NOTE — ED PROVIDER NOTES
Subjective   History of Present Illness  Patient is a 67-year-old female presents emerged today with complaint of chest pain.  Patient ports she had some chest pain last night states that it resolved which went to bed.  Patient reports upon waking this morning she was having more chest pain and states that today she feels like someone is standing on her chest.  Patient reports that she is not particularly short of breath.  Patient does report she is nauseated and has 1 episode of vomiting.  Patient reports she is still currently nauseated and has some mild pain.    Chest Pain      Review of Systems   Constitutional: Negative.    HENT: Negative.    Eyes: Negative.    Respiratory: Negative.    Cardiovascular: Positive for chest pain.   Gastrointestinal: Negative.    Endocrine: Negative.    Genitourinary: Negative.    Musculoskeletal: Negative.    Skin: Negative.    Allergic/Immunologic: Negative.    Neurological: Negative.    Hematological: Negative.    Psychiatric/Behavioral: Negative.        Past Medical History:   Diagnosis Date   • Arm fracture    • Hyperlipidemia    • Hypertension    • Osteoporosis        No Known Allergies    Past Surgical History:   Procedure Laterality Date   • CARDIAC CATHETERIZATION N/A 10/19/2022    Procedure: Left Heart Cath;  Surgeon: Piotr Edwards DO;  Location: [facility] INVASIVE LOCATION;  Service: Cardiology;  Laterality: N/A;   • CARPAL TUNNEL RELEASE Bilateral    • CATARACT EXTRACTION     • CHOLECYSTECTOMY     • ESOPHAGOSCOPY W/ DILATION     • HYSTERECTOMY         Family History   Problem Relation Age of Onset   • Hypertension Mother    • Heart disease Father    • Hypertension Father    • Hypertension Sister    • Heart disease Brother    • Hypertension Brother    • Diabetes Brother    • Breast cancer Paternal Cousin    • Breast cancer Maternal Cousin        Social History     Socioeconomic History   • Marital status:    Tobacco Use   • Smoking status: Former     Types:  Electronic Cigarette, Cigarettes     Quit date:      Years since quittin.3   • Smokeless tobacco: Never   Substance and Sexual Activity   • Alcohol use: No   • Drug use: No   • Sexual activity: Defer           Objective   Physical Exam  Vitals and nursing note reviewed.   Constitutional:       Appearance: She is well-developed.   HENT:      Head: Normocephalic.      Right Ear: External ear normal.      Left Ear: External ear normal.   Eyes:      Conjunctiva/sclera: Conjunctivae normal.      Pupils: Pupils are equal, round, and reactive to light.   Cardiovascular:      Rate and Rhythm: Normal rate and regular rhythm.      Heart sounds: Normal heart sounds.   Pulmonary:      Effort: Pulmonary effort is normal.      Breath sounds: Normal breath sounds.   Abdominal:      General: Bowel sounds are normal.      Palpations: Abdomen is soft.   Musculoskeletal:         General: Normal range of motion.      Cervical back: Normal range of motion and neck supple.   Skin:     General: Skin is warm and dry.      Capillary Refill: Capillary refill takes less than 2 seconds.   Neurological:      Mental Status: She is alert and oriented to person, place, and time.   Psychiatric:         Behavior: Behavior normal.         Thought Content: Thought content normal.         Procedures        Results for orders placed or performed during the hospital encounter of 23   COVID-19 and FLU A/B PCR - Swab, Nasopharynx    Specimen: Nasopharynx; Swab   Result Value Ref Range    COVID19 Not Detected Not Detected - Ref. Range    Influenza A PCR Not Detected Not Detected    Influenza B PCR Not Detected Not Detected   Comprehensive Metabolic Panel    Specimen: Blood   Result Value Ref Range    Glucose 121 (H) 65 - 99 mg/dL    BUN 11 8 - 23 mg/dL    Creatinine 0.95 0.57 - 1.00 mg/dL    Sodium 142 136 - 145 mmol/L    Potassium 3.6 3.5 - 5.2 mmol/L    Chloride 102 98 - 107 mmol/L    CO2 28.4 22.0 - 29.0 mmol/L    Calcium 9.4 8.6 - 10.5  mg/dL    Total Protein 7.1 6.0 - 8.5 g/dL    Albumin 4.0 3.5 - 5.2 g/dL    ALT (SGPT) 13 1 - 33 U/L    AST (SGOT) 14 1 - 32 U/L    Alkaline Phosphatase 93 39 - 117 U/L    Total Bilirubin 0.5 0.0 - 1.2 mg/dL    Globulin 3.1 gm/dL    A/G Ratio 1.3 g/dL    BUN/Creatinine Ratio 11.6 7.0 - 25.0    Anion Gap 11.6 5.0 - 15.0 mmol/L    eGFR 66.2 >60.0 mL/min/1.73   High Sensitivity Troponin T    Specimen: Blood   Result Value Ref Range    HS Troponin T 8 <10 ng/L   Protime-INR    Specimen: Blood   Result Value Ref Range    Protime 13.1 12.1 - 14.7 Seconds    INR 0.94 0.90 - 1.10   CBC Auto Differential    Specimen: Blood   Result Value Ref Range    WBC 8.47 3.40 - 10.80 10*3/mm3    RBC 4.99 3.77 - 5.28 10*6/mm3    Hemoglobin 14.9 12.0 - 15.9 g/dL    Hematocrit 46.2 34.0 - 46.6 %    MCV 92.6 79.0 - 97.0 fL    MCH 29.9 26.6 - 33.0 pg    MCHC 32.3 31.5 - 35.7 g/dL    RDW 13.6 12.3 - 15.4 %    RDW-SD 46.2 37.0 - 54.0 fl    MPV 10.0 6.0 - 12.0 fL    Platelets 293 140 - 450 10*3/mm3    Neutrophil % 87.2 (H) 42.7 - 76.0 %    Lymphocyte % 6.8 (L) 19.6 - 45.3 %    Monocyte % 4.6 (L) 5.0 - 12.0 %    Eosinophil % 0.8 0.3 - 6.2 %    Basophil % 0.2 0.0 - 1.5 %    Immature Grans % 0.4 0.0 - 0.5 %    Neutrophils, Absolute 7.38 (H) 1.70 - 7.00 10*3/mm3    Lymphocytes, Absolute 0.58 (L) 0.70 - 3.10 10*3/mm3    Monocytes, Absolute 0.39 0.10 - 0.90 10*3/mm3    Eosinophils, Absolute 0.07 0.00 - 0.40 10*3/mm3    Basophils, Absolute 0.02 0.00 - 0.20 10*3/mm3    Immature Grans, Absolute 0.03 0.00 - 0.05 10*3/mm3    nRBC 0.0 0.0 - 0.2 /100 WBC   High Sensitivity Troponin T 2Hr    Specimen: Blood   Result Value Ref Range    HS Troponin T 11 (H) <10 ng/L    Troponin T Delta 3 >=-4 - <+4 ng/L   ECG 12 Lead Chest Pain   Result Value Ref Range    QT Interval 344 ms    QTC Interval 439 ms   Green Top (Gel)   Result Value Ref Range    Extra Tube Hold for add-ons.    Lavender Top   Result Value Ref Range    Extra Tube hold for add-on    Gold Top - SST    Result Value Ref Range    Extra Tube Hold for add-ons.    Light Blue Top   Result Value Ref Range    Extra Tube Hold for add-ons.      XR Chest 1 View   Final Result       No chest radiographic evidence of acute cardiopulmonary disease.       This report was finalized on 4/24/2023 7:17 PM by Jose Cruz Pepe MD.                  ED Course  ED Course as of 04/24/23 2108   Mon Apr 24, 2023   1658 Noraml sinus. HR 98. . . No stemi. St depressed in inferior leads. Electronically signed by Bella Aguiar MD, 04/24/23, 4:58 PM EDT.  [LS]   2107 Heart score 3   [NADIRA]      ED Course User Index  [NADIRA] Omar Tee P, APRN  [LS] Bella Aguiar MD                                           Medical Decision Making  Patient is a 67-year-old female presents emerged today with complaint of chest pain.  Patient ports she had some chest pain last night states that it resolved which went to bed.  Patient reports upon waking this morning she was having more chest pain and states that today she feels like someone is standing on her chest.  Patient reports that she is not particularly short of breath.  Patient does report she is nauseated and has 1 episode of vomiting.  Patient reports she is still currently nauseated and has some mild pain.    Atypical chest pain: acute illness or injury  Amount and/or Complexity of Data Reviewed  Labs: ordered. Decision-making details documented in ED Course.  Radiology: ordered. Decision-making details documented in ED Course.  ECG/medicine tests: ordered. Decision-making details documented in ED Course.      Risk  Prescription drug management.          Final diagnoses:   Atypical chest pain       ED Disposition  ED Disposition     ED Disposition   Discharge    Condition   Stable    Comment   --             Lawrence Faulkner, DO  [address] [place] [address]  [address]  [place] [id number] [phone]    Schedule an appointment as soon as possible for a visit   For further  evaluation         Medication List      No changes were made to your prescriptions during this visit.          Omar Tee P, APRN  04/24/23 3734

## 2023-04-24 NOTE — ED NOTES
MEDICAL SCREENING:    Reason for Visit: chest pain/pressure and dizziness that started yesterday and vomiting started today around noon.     Patient initially seen in triage. The patient was advised further evaluation and diagnostic testing will be needed, some of the treatment and testing will be initiated in the lobby in order to begin the process. The patient will be returned to the waiting area for the time being and possibly be re-assessed by a subsequent ED provider. The patient will be brought back to the treatment area in as timely manner as possible.        Radha Capps, APRN  04/24/23 6753

## 2023-04-24 NOTE — TELEPHONE ENCOUNTER
Called and said that she was having chest pressure running into her shoulder blade and she has started throwing up I did advise her to go to the ER and she said she would

## 2023-04-25 ENCOUNTER — TREATMENT (OUTPATIENT)
Dept: CARDIOLOGY | Facility: CLINIC | Age: 68
End: 2023-04-25
Payer: MEDICARE

## 2023-04-25 ENCOUNTER — OFFICE VISIT (OUTPATIENT)
Dept: CARDIOLOGY | Facility: CLINIC | Age: 68
End: 2023-04-25
Payer: MEDICARE

## 2023-04-25 VITALS
WEIGHT: 182 LBS | HEIGHT: 64 IN | HEART RATE: 68 BPM | BODY MASS INDEX: 31.07 KG/M2 | OXYGEN SATURATION: 96 % | SYSTOLIC BLOOD PRESSURE: 101 MMHG | DIASTOLIC BLOOD PRESSURE: 54 MMHG

## 2023-04-25 DIAGNOSIS — I25.10 ASCVD (ARTERIOSCLEROTIC CARDIOVASCULAR DISEASE): ICD-10-CM

## 2023-04-25 DIAGNOSIS — R07.9 CHEST PAIN, UNSPECIFIED TYPE: ICD-10-CM

## 2023-04-25 DIAGNOSIS — I48.0 PAROXYSMAL ATRIAL FIBRILLATION: ICD-10-CM

## 2023-04-25 DIAGNOSIS — R00.2 PALPITATIONS: ICD-10-CM

## 2023-04-25 DIAGNOSIS — I48.0 PAROXYSMAL ATRIAL FIBRILLATION: Primary | ICD-10-CM

## 2023-04-25 PROCEDURE — 1160F RVW MEDS BY RX/DR IN RCRD: CPT | Performed by: INTERNAL MEDICINE

## 2023-04-25 PROCEDURE — 1159F MED LIST DOCD IN RCRD: CPT | Performed by: INTERNAL MEDICINE

## 2023-04-25 PROCEDURE — 99214 OFFICE O/P EST MOD 30 MIN: CPT | Performed by: INTERNAL MEDICINE

## 2023-04-25 PROCEDURE — 3074F SYST BP LT 130 MM HG: CPT | Performed by: INTERNAL MEDICINE

## 2023-04-25 PROCEDURE — 3078F DIAST BP <80 MM HG: CPT | Performed by: INTERNAL MEDICINE

## 2023-04-25 NOTE — LETTER
April 26, 2023     Lawrence Faulkner DO  [address] [place] [address] Drive  [address]  [place] [id number]    Patient: Ivis Vela   YOB: 1955   Date of Visit: 4/25/2023       Dear Lawrence:    Thank you for referring Ivis Vela to me for evaluation. Below are the relevant portions of my assessment and plan of care.    If you have questions, please do not hesitate to call me. I look forward to following Ivis along with you.         Sincerely,        Cristofer Arceo MD        CC: No Recipients    Cristofer Arceo MD  04/26/23 1500  Sign when Signing Visit  Lawrence FaulknerDO Ivis  1955 04/25/2023    Patient Active Problem List   Diagnosis   • Hypertension   • Asthma   • Osteoporosis   • Left arm pain   • Closed nondisplaced fracture of head of radius with routine healing   • Palpitations   • Dizziness and giddiness   • Unstable angina   • Paroxysmal atrial fibrillation   • PVC's (premature ventricular contractions)   • Premature atrial contractions   • VT (ventricular tachycardia)       Dear Dr. Faulkner:    Subjective      Ivis Vela is a 67 y.o. female with the problems as listed above, presents    Chief complaint: Recurrent chest pains.    History of Present Illness: Ms. Vela is a pleasant 67-year-old [place] female with history of nonobstructive coronary artery disease noted on cardiac catheterization on 10/19/2022.  She is here for regular cardiology follow-up.  She has noticed some chest pains recently which she describes as mostly sharp pains on the left side of her chest underneath her left breast with some radiation to her back.  These seem to occur at random and resolve spontaneously.  They are of moderate intensity.  She went to the ED at [facility] for this yesterday and was discharged home.  Her high-sensitivity troponin was almost normal with delta of 3.  She denies any significant dyspnea, PND, orthopnea pedal edema.  She states her blood pressure at home runs around 140s over  90s.    Ivis Vela  Regadenoson Stress Test with Myocardial Perfusion SPECT (Multi Study)  Order# [id number]  Reading physician: Cristofer Arceo MD Ordering physician: Baylee Chaudhary APRN Study date: 22       Patient Name   Ivis SHOEMAKER   [id number] Legal Sex   Female  (Age)   1955 (67 y.o.)     Interpretation Summary    A pharmacological stress test was performed using regadenoson without low-level exercise.  Findings consistent with a normal ECG stress test.  Myocardial perfusion imaging indicates a normal myocardial perfusion study with no evidence of ischemia.  Normal LV cavity size. Normal LV wall motion noted.  Left ventricular ejection fraction is hyperdynamic (Calculated EF > 70%). .  Impressions are consistent with a low risk study.       Ivis Vela  Cardiac Catheterization/Vascular Study   Reading physician: Piotr Edwards DO Ordering physician: Piotr Edwards DO Study date: 10/19/22        Left Heart Cath      Patient Information    Patient Name   Ivis SHOEMAKER   [id number] Legal Sex   Female  (Age)   1955 (67 y.o.)   Race Ethnicity Encounter Category   White or  Not  or  Emergency       Physicians    Panel Physicians Referring Physician Case Authorizing Physician   Piotr Edwards DO (Primary) Provider, No Known Piotr Edwards DO     Conclusion     Moderate CAD noted worse seen in the      Aggressive recertification (risk factor modification)    Recommendations    •     Optimize medical therapy.   •     High intensity statin therapy.       No Known Allergies:    Current Outpatient Medications:   •  alendronate (FOSAMAX) 70 MG tablet, Take 1 tablet by mouth Every 7 (Seven) Days., Disp: , Rfl:   •  amitriptyline (ELAVIL) 25 MG tablet, Take 1 tablet by mouth Every Night., Disp: , Rfl:   •  apixaban (ELIQUIS) 5 MG tablet tablet, Take 1 tablet by mouth 2 (Two) Times a Day., Disp: 14 tablet, Rfl: 0  •  apixaban (Eliquis) 5 MG tablet tablet, Take 1 tablet by  "mouth 2 (Two) Times a Day., Disp: 60 tablet, Rfl: 0  •  aspirin 81 MG EC tablet, Take 1 tablet by mouth Daily., Disp: , Rfl:   •  atorvastatin (LIPITOR) 80 MG tablet, Take 1 tablet by mouth Daily., Disp: 90 tablet, Rfl: 3  •  flecainide (TAMBOCOR) 50 MG tablet, Take 1 tablet by mouth 2 (Two) Times a Day., Disp: 60 tablet, Rfl: 11  •  isosorbide mononitrate (IMDUR) 60 MG 24 hr tablet, Take 1 tablet by mouth Daily., Disp: 30 tablet, Rfl: 5  •  losartan-hydrochlorothiazide (HYZAAR) 100-12.5 MG per tablet, Take 0.5 tablets by mouth Daily., Disp: , Rfl:   •  nitroglycerin (NITROSTAT) 0.4 MG SL tablet, 1 under the tongue as needed for angina, may repeat q5mins for up three doses, Disp: 25 tablet, Rfl: 2  •  pantoprazole (PROTONIX) 40 MG EC tablet, Take 1 tablet by mouth Daily., Disp: , Rfl:   •  metoprolol tartrate (LOPRESSOR) 25 MG tablet, Take 0.5 tablets by mouth 2 (Two) Times a Day for 30 days., Disp: 30 tablet, Rfl: 0  No current facility-administered medications for this visit.    The following portions of the patient's history were reviewed and updated as appropriate: allergies, current medications, past family history, past medical history, past social history, past surgical history and problem list.    Social History     Tobacco Use   • Smoking status: Former     Types: Electronic Cigarette, Cigarettes     Quit date:      Years since quittin.3   • Smokeless tobacco: Never   Substance Use Topics   • Alcohol use: No   • Drug use: No     Review of Systems   Cardiovascular: Positive for chest pain and palpitations. Negative for leg swelling, near-syncope and syncope.   Respiratory: Negative for shortness of breath.    Gastrointestinal: Positive for nausea and vomiting.   Neurological: Positive for dizziness.     Objective    Vitals:    23 1253   BP: 101/54   BP Location: Left arm   Patient Position: Sitting   Cuff Size: Adult   Pulse: 68   SpO2: 96%   Weight: 82.6 kg (182 lb)   Height: 162.6 cm (64\") "     Body mass index is 31.24 kg/m².    Vitals reviewed.   Constitutional:       Appearance: Well-developed.   Eyes:      Conjunctiva/sclera: Conjunctivae normal.   HENT:      Head: Normocephalic.   Neck:      Thyroid: No thyromegaly.      Vascular: No JVD.      Trachea: No tracheal deviation.   Pulmonary:      Effort: No respiratory distress.      Breath sounds: Normal breath sounds. No wheezing. No rales.   Cardiovascular:      PMI at left midclavicular line. Normal rate. Regular rhythm. Normal S1. Normal S2.      Murmurs: There is no murmur.      No gallop. No click. No rub.   Pulses:     Intact distal pulses.   Edema:     Peripheral edema absent.   Abdominal:      General: Bowel sounds are normal.      Palpations: Abdomen is soft. There is no abdominal mass.      Tenderness: There is no abdominal tenderness.   Musculoskeletal:      Cervical back: Normal range of motion and neck supple. Skin:     General: Skin is warm and dry.   Neurological:      Mental Status: Alert and oriented to person, place, and time.      Cranial Nerves: No cranial nerve deficit.       Lab Results   Component Value Date     04/24/2023    K 3.6 04/24/2023     04/24/2023    CO2 28.4 04/24/2023    BUN 11 04/24/2023    CREATININE 0.95 04/24/2023    GLUCOSE 121 (H) 04/24/2023    CALCIUM 9.4 04/24/2023    AST 14 04/24/2023    ALT 13 04/24/2023    ALKPHOS 93 04/24/2023     No results found for: CKTOTAL  Lab Results   Component Value Date    WBC 8.47 04/24/2023    HGB 14.9 04/24/2023    HCT 46.2 04/24/2023     04/24/2023     Lab Results   Component Value Date    INR 0.94 04/24/2023    INR 1.04 11/01/2022    INR 1.05 10/19/2022     Lab Results   Component Value Date    MG 2.3 10/19/2022     Lab Results   Component Value Date    TSH 2.230 10/18/2022    TRIG 96 10/18/2022    HDL 55 10/18/2022    LDL 85 10/18/2022        Assessment & Plan    Diagnosis Plan   1. Paroxysmal atrial fibrillation        2. ASCVD (arteriosclerotic  cardiovascular disease) (nonobstructive) on coronary angiogram in October 2022.       3. Chest pain, unspecified type        4. Palpitations          Recommendations  We will increase the dose of metoprolol tartrate to 25 mg p.o. twice daily.  Continue with losartan/HCTZ at current dose  Continue with low-dose aspirin.  Continue with Eliquis for stroke prevention.  Increase the dose of isosorbide mononitrate to 90 mg daily.  We will try to track down the Holter monitor that she had done recently and make further recommendations accordingly.      Return in about 2 months (around 6/25/2023).    As always, Dr. Faulkner, I appreciate very much the opportunity to participate in the cardiovascular care of your patients. Please do not hesitate to call me with any questions with regards to Ivis Vela's evaluation and management.       With Best Regards,        Cristofer Arceo MD, [facility]    Please note that portions of this note were completed with a voice recognition program.

## 2023-04-25 NOTE — PROGRESS NOTES
Lawrence Faulkner DO  Ivis Vela  2023    Patient Active Problem List   Diagnosis   • Hypertension   • Asthma   • Osteoporosis   • Left arm pain   • Closed nondisplaced fracture of head of radius with routine healing   • Palpitations   • Dizziness and giddiness   • Unstable angina   • Paroxysmal atrial fibrillation   • PVC's (premature ventricular contractions)   • Premature atrial contractions   • VT (ventricular tachycardia)       Dear Lawrence Faulkner DO:    Subjective     Ivis Vela is a 67 y.o. female with the problems as listed above, presents    Chief complaint: Recurrent chest pains.    History of Present Illness: Ms. Vela is a pleasant 67-year-old [place] female with history of nonobstructive coronary artery disease noted on cardiac catheterization on 10/19/2022.  She is here for regular cardiology follow-up.  She has noticed some chest pains recently which she describes as mostly sharp pains on the left side of her chest underneath her left breast with some radiation to her back.  These seem to occur at random and resolve spontaneously.  They are of moderate intensity.  She went to the ED at [facility] for this yesterday and was discharged home.  Her high-sensitivity troponin was almost normal with delta of 3.  She denies any significant dyspnea, PND, orthopnea pedal edema.  She states her blood pressure at home runs around 140s over 90s.    Ivis Vela  Regadenoson Stress Test with Myocardial Perfusion SPECT (Multi Study)  Order# [id number]  Reading physician: Cristofer Arceo MD Ordering physician: Baylee Chaudhary APRN Study date: 22       Patient Name   Ivis Vela MRN   [id number] Legal Sex   Female  (Age)   1955 (67 y.o.)     Interpretation Summary    • A pharmacological stress test was performed using regadenoson without low-level exercise.  • Findings consistent with a normal ECG stress test.  • Myocardial perfusion imaging indicates a normal myocardial  perfusion study with no evidence of ischemia.  • Normal LV cavity size. Normal LV wall motion noted.  • Left ventricular ejection fraction is hyperdynamic (Calculated EF > 70%). .  • Impressions are consistent with a low risk study.       Ivis Vela  Cardiac Catheterization/Vascular Study   Reading physician: Piotr Edwards DO Ordering physician: Piotr Edwards DO Study date: 10/19/22        Left Heart Cath      Patient Information    Patient Name   Ivis Vela MRN   [id number] Legal Sex   Female  (Age)   1955 (67 y.o.)   Race Ethnicity Encounter Category   White or  Not  or  Emergency       Physicians    Panel Physicians Referring Physician Case Authorizing Physician   Piotr Edwards DO (Primary) Provider, No Known Piotr Edwards DO     Conclusion     Moderate CAD noted worse seen in the OM     Aggressive recertification (risk factor modification)    Recommendations    •     Optimize medical therapy.   •     High intensity statin therapy.       No Known Allergies:    Current Outpatient Medications:   •  alendronate (FOSAMAX) 70 MG tablet, Take 1 tablet by mouth Every 7 (Seven) Days., Disp: , Rfl:   •  amitriptyline (ELAVIL) 25 MG tablet, Take 1 tablet by mouth Every Night., Disp: , Rfl:   •  apixaban (ELIQUIS) 5 MG tablet tablet, Take 1 tablet by mouth 2 (Two) Times a Day., Disp: 14 tablet, Rfl: 0  •  apixaban (Eliquis) 5 MG tablet tablet, Take 1 tablet by mouth 2 (Two) Times a Day., Disp: 60 tablet, Rfl: 0  •  aspirin 81 MG EC tablet, Take 1 tablet by mouth Daily., Disp: , Rfl:   •  atorvastatin (LIPITOR) 80 MG tablet, Take 1 tablet by mouth Daily., Disp: 90 tablet, Rfl: 3  •  flecainide (TAMBOCOR) 50 MG tablet, Take 1 tablet by mouth 2 (Two) Times a Day., Disp: 60 tablet, Rfl: 11  •  isosorbide mononitrate (IMDUR) 60 MG 24 hr tablet, Take 1 tablet by mouth Daily., Disp: 30 tablet, Rfl: 5  •  losartan-hydrochlorothiazide (HYZAAR) 100-12.5 MG per tablet, Take 0.5 tablets by mouth  "Daily., Disp: , Rfl:   •  nitroglycerin (NITROSTAT) 0.4 MG SL tablet, 1 under the tongue as needed for angina, may repeat q5mins for up three doses, Disp: 25 tablet, Rfl: 2  •  pantoprazole (PROTONIX) 40 MG EC tablet, Take 1 tablet by mouth Daily., Disp: , Rfl:   •  metoprolol tartrate (LOPRESSOR) 25 MG tablet, Take 0.5 tablets by mouth 2 (Two) Times a Day for 30 days., Disp: 30 tablet, Rfl: 0  No current facility-administered medications for this visit.    The following portions of the patient's history were reviewed and updated as appropriate: allergies, current medications, past family history, past medical history, past social history, past surgical history and problem list.    Social History     Tobacco Use   • Smoking status: Former     Types: Electronic Cigarette, Cigarettes     Quit date:      Years since quittin.3   • Smokeless tobacco: Never   Substance Use Topics   • Alcohol use: No   • Drug use: No     Review of Systems   Cardiovascular: Positive for chest pain and palpitations. Negative for leg swelling, near-syncope and syncope.   Respiratory: Negative for shortness of breath.    Gastrointestinal: Positive for nausea and vomiting.   Neurological: Positive for dizziness.     Objective   Vitals:    23 1253   BP: 101/54   BP Location: Left arm   Patient Position: Sitting   Cuff Size: Adult   Pulse: 68   SpO2: 96%   Weight: 82.6 kg (182 lb)   Height: 162.6 cm (64\")     Body mass index is 31.24 kg/m².    Vitals reviewed.   Constitutional:       Appearance: Well-developed.   Eyes:      Conjunctiva/sclera: Conjunctivae normal.   HENT:      Head: Normocephalic.   Neck:      Thyroid: No thyromegaly.      Vascular: No JVD.      Trachea: No tracheal deviation.   Pulmonary:      Effort: No respiratory distress.      Breath sounds: Normal breath sounds. No wheezing. No rales.   Cardiovascular:      PMI at left midclavicular line. Normal rate. Regular rhythm. Normal S1. Normal S2.      Murmurs: There " is no murmur.      No gallop. No click. No rub.   Pulses:     Intact distal pulses.   Edema:     Peripheral edema absent.   Abdominal:      General: Bowel sounds are normal.      Palpations: Abdomen is soft. There is no abdominal mass.      Tenderness: There is no abdominal tenderness.   Musculoskeletal:      Cervical back: Normal range of motion and neck supple. Skin:     General: Skin is warm and dry.   Neurological:      Mental Status: Alert and oriented to person, place, and time.      Cranial Nerves: No cranial nerve deficit.       Lab Results   Component Value Date     04/24/2023    K 3.6 04/24/2023     04/24/2023    CO2 28.4 04/24/2023    BUN 11 04/24/2023    CREATININE 0.95 04/24/2023    GLUCOSE 121 (H) 04/24/2023    CALCIUM 9.4 04/24/2023    AST 14 04/24/2023    ALT 13 04/24/2023    ALKPHOS 93 04/24/2023     No results found for: CKTOTAL  Lab Results   Component Value Date    WBC 8.47 04/24/2023    HGB 14.9 04/24/2023    HCT 46.2 04/24/2023     04/24/2023     Lab Results   Component Value Date    INR 0.94 04/24/2023    INR 1.04 11/01/2022    INR 1.05 10/19/2022     Lab Results   Component Value Date    MG 2.3 10/19/2022     Lab Results   Component Value Date    TSH 2.230 10/18/2022    TRIG 96 10/18/2022    HDL 55 10/18/2022    LDL 85 10/18/2022        Assessment & Plan    Diagnosis Plan   1. Paroxysmal atrial fibrillation        2. ASCVD (arteriosclerotic cardiovascular disease)        3. Chest pain, unspecified type        4. Palpitations          Recommendations  1. We will increase the dose of metoprolol tartrate to 25 mg p.o. twice daily.  2. Continue with losartan/HCTZ at current dose  3. Continue with low-dose aspirin.  4. Continue with Eliquis for stroke prevention.  5. Increase the dose of isosorbide mononitrate to 90 mg daily.  6. We will try to track down the Holter monitor that she had done recently.      Return in about 2 months (around 6/25/2023).    As always, Dr. Faulkner I  appreciate very much the opportunity to participate in the cardiovascular care of your patients. Please do not hesitate to call me with any questions with regards to Iivs Vela's evaluation and management.       With Best Regards,        Cristofer Arceo MD, FACC    Please note that portions of this note were completed with a voice recognition program.

## 2023-05-23 ENCOUNTER — TRANSCRIBE ORDERS (OUTPATIENT)
Dept: ADMINISTRATIVE | Facility: HOSPITAL | Age: 68
End: 2023-05-23
Payer: MEDICARE

## 2023-05-23 DIAGNOSIS — F17.290 NICOTINE DEPENDENCE, OTHER TOBACCO PRODUCT, UNCOMPLICATED: Primary | ICD-10-CM

## 2023-05-23 DIAGNOSIS — F17.290 OTHER TOBACCO PRODUCT NICOTINE DEPENDENCE, UNCOMPLICATED: Primary | ICD-10-CM

## 2023-05-25 ENCOUNTER — TRANSCRIBE ORDERS (OUTPATIENT)
Dept: ADMINISTRATIVE | Facility: HOSPITAL | Age: 68
End: 2023-05-25

## 2023-05-25 DIAGNOSIS — F17.200 NICOTINE DEPENDENCE, UNCOMPLICATED, UNSPECIFIED NICOTINE PRODUCT TYPE: Primary | ICD-10-CM

## 2023-05-31 ENCOUNTER — TRANSCRIBE ORDERS (OUTPATIENT)
Dept: ADMINISTRATIVE | Facility: HOSPITAL | Age: 68
End: 2023-05-31

## 2023-05-31 DIAGNOSIS — F17.200 NICOTINE DEPENDENCE, UNCOMPLICATED, UNSPECIFIED NICOTINE PRODUCT TYPE: Primary | ICD-10-CM

## 2023-06-13 ENCOUNTER — HOSPITAL ENCOUNTER (OUTPATIENT)
Dept: CT IMAGING | Facility: HOSPITAL | Age: 68
Discharge: HOME | End: 2023-06-13
Admitting: NURSE PRACTITIONER
Payer: MEDICARE

## 2023-06-13 DIAGNOSIS — F17.290 OTHER TOBACCO PRODUCT NICOTINE DEPENDENCE, UNCOMPLICATED: ICD-10-CM

## 2023-06-13 PROCEDURE — 71271 CT THORAX LUNG CANCER SCR C-: CPT

## 2023-06-13 PROCEDURE — 71271 CT THORAX LUNG CANCER SCR C-: CPT | Performed by: RADIOLOGY

## 2023-10-05 ENCOUNTER — OFFICE VISIT (OUTPATIENT)
Dept: CARDIOLOGY | Facility: CLINIC | Age: 68
End: 2023-10-05
Payer: MEDICARE

## 2023-10-05 ENCOUNTER — TELEPHONE (OUTPATIENT)
Dept: CARDIOLOGY | Facility: CLINIC | Age: 68
End: 2023-10-05
Payer: MEDICARE

## 2023-10-05 VITALS
DIASTOLIC BLOOD PRESSURE: 78 MMHG | BODY MASS INDEX: 30.39 KG/M2 | WEIGHT: 178 LBS | HEART RATE: 65 BPM | HEIGHT: 64 IN | RESPIRATION RATE: 18 BRPM | OXYGEN SATURATION: 100 % | SYSTOLIC BLOOD PRESSURE: 137 MMHG

## 2023-10-05 DIAGNOSIS — I49.1 PREMATURE ATRIAL CONTRACTIONS: ICD-10-CM

## 2023-10-05 DIAGNOSIS — I48.0 PAROXYSMAL ATRIAL FIBRILLATION: Primary | ICD-10-CM

## 2023-10-05 DIAGNOSIS — I25.10 ASCVD (ARTERIOSCLEROTIC CARDIOVASCULAR DISEASE): ICD-10-CM

## 2023-10-05 DIAGNOSIS — I10 ESSENTIAL HYPERTENSION: ICD-10-CM

## 2023-10-05 PROCEDURE — 93000 ELECTROCARDIOGRAM COMPLETE: CPT | Performed by: NURSE PRACTITIONER

## 2023-10-05 PROCEDURE — 3075F SYST BP GE 130 - 139MM HG: CPT | Performed by: NURSE PRACTITIONER

## 2023-10-05 PROCEDURE — 3078F DIAST BP <80 MM HG: CPT | Performed by: NURSE PRACTITIONER

## 2023-10-05 PROCEDURE — 1159F MED LIST DOCD IN RCRD: CPT | Performed by: NURSE PRACTITIONER

## 2023-10-05 PROCEDURE — 1160F RVW MEDS BY RX/DR IN RCRD: CPT | Performed by: NURSE PRACTITIONER

## 2023-10-05 PROCEDURE — 99214 OFFICE O/P EST MOD 30 MIN: CPT | Performed by: NURSE PRACTITIONER

## 2023-10-05 NOTE — TELEPHONE ENCOUNTER
Called to make pt a sooner apt. LM with 's nurse.       ----- Message from GAYLE Haskins sent at 10/5/2023  9:38 AM EDT -----  Patient needs to see Dr. Alonso. She can travel to [place]. Please help facilitate a sooner appointment for her.

## 2023-10-05 NOTE — Clinical Note
Patient needs to see Dr. Alonso. She can travel to [place]. Please help facilitate a sooner appointment for her.

## 2023-10-05 NOTE — LETTER
October 10, 2023     Lawrence Faulkner DO  [address] [place] [address]  [address]  [place] [phone]    Patient: Ivis Vela   YOB: 1955   Date of Visit: 10/5/2023     Dear Lawrence Faulkner DO:       Thank you for referring Ivis Vela to me for evaluation. Below are the relevant portions of my assessment and plan of care.    If you have questions, please do not hesitate to call me. I look forward to following Ivis along with you.         Sincerely,        GAYLE Haskins        CC: No Recipients    Baylee Chaudhary APRN  10/10/23 1029  Sign when Signing Visit  Lawrence Faulkner DO  Ivis Vela  1955  10/05/2023    Patient Active Problem List   Diagnosis    Hypertension    Asthma    Osteoporosis    Left arm pain    Closed nondisplaced fracture of head of radius with routine healing    Palpitations    Dizziness and giddiness    Unstable angina    Paroxysmal atrial fibrillation    PVC's (premature ventricular contractions)    Premature atrial contractions    VT (ventricular tachycardia)       Dear Lawrence Faulkner DO:    Subjective    Chief Complaint   Patient presents with    Follow-up     ROUTINE           History of Present Illness:    Ivis Vela is a 68 y.o. female with a past medical history of nonobstructive coronary artery disease and paroxysmal atrial fibrillation. She presents today for routine cardiology follow-up. She has had increased palpitations recently causing shortness of breath and chest discomfort. She has tolerated Eliquis well with no bleeding issues. No syncope or dizziness.          No Known Allergies:      Current Outpatient Medications:     apixaban (ELIQUIS) 5 MG tablet tablet, Take 1 tablet by mouth 2 (Two) Times a Day., Disp: 56 tablet, Rfl: 0    aspirin 81 MG EC tablet, Take 1 tablet by mouth Daily., Disp: , Rfl:     atorvastatin (LIPITOR) 80 MG tablet, Take 1 tablet by mouth Daily., Disp: 90 tablet, Rfl: 3    flecainide (TAMBOCOR) 50 MG tablet, Take 1  "tablet by mouth 2 (Two) Times a Day., Disp: 60 tablet, Rfl: 11    isosorbide mononitrate (IMDUR) 60 MG 24 hr tablet, Take 1 tablet by mouth Daily., Disp: 30 tablet, Rfl: 5    losartan-hydrochlorothiazide (HYZAAR) 100-12.5 MG per tablet, Take 0.5 tablets by mouth Daily., Disp: , Rfl:     metoprolol tartrate (LOPRESSOR) 25 MG tablet, Take 0.5 tablets by mouth 2 (Two) Times a Day for 30 days., Disp: 30 tablet, Rfl: 0    nitroglycerin (NITROSTAT) 0.4 MG SL tablet, 1 under the tongue as needed for angina, may repeat q5mins for up three doses, Disp: 25 tablet, Rfl: 2    pantoprazole (PROTONIX) 40 MG EC tablet, Take 1 tablet by mouth Daily., Disp: , Rfl:     alendronate (FOSAMAX) 70 MG tablet, Take 1 tablet by mouth Every 7 (Seven) Days. (Patient not taking: Reported on 10/5/2023), Disp: , Rfl:     amitriptyline (ELAVIL) 25 MG tablet, Take 1 tablet by mouth Every Night. (Patient not taking: Reported on 10/5/2023), Disp: , Rfl:       The following portions of the patient's history were reviewed and updated as appropriate: allergies, current medications, past family history, past medical history, past social history, past surgical history and problem list.    Social History     Tobacco Use    Smoking status: Former     Types: Electronic Cigarette, Cigarettes     Quit date:      Years since quittin.7    Smokeless tobacco: Never   Vaping Use    Vaping Use: Never used   Substance Use Topics    Alcohol use: No    Drug use: No       Review of Systems   Constitutional: Negative for decreased appetite and malaise/fatigue.   Cardiovascular:  Positive for chest pain, dyspnea on exertion and palpitations.   Respiratory:  Negative for cough and shortness of breath.        Objective  Vitals:    10/05/23 0847   BP: 137/78   BP Location: Left arm   Patient Position: Sitting   Cuff Size: Adult   Pulse: 65   Resp: 18   SpO2: 100%   Weight: 80.7 kg (178 lb)   Height: 162.6 cm (64\")     Body mass index is 30.55 " "kg/mý.        Vitals reviewed.   Constitutional:       Appearance: Healthy appearance. Well-developed and not in distress.   HENT:      Head: Normocephalic and atraumatic.   Neck:      Vascular: No carotid bruit or JVD.   Pulmonary:      Effort: Pulmonary effort is normal.      Breath sounds: Normal breath sounds. No wheezing. No rales.   Cardiovascular:      Normal rate. Irregularly irregular rhythm.      Murmurs: There is no murmur.      . No S3 and S4 gallop.   Edema:     Peripheral edema absent.   Abdominal:      General: Bowel sounds are normal.      Palpations: Abdomen is soft.   Skin:     General: Skin is warm and dry.   Neurological:      Mental Status: Alert, oriented to person, place, and time and oriented to person, place and time.   Psychiatric:         Mood and Affect: Mood normal.         Behavior: Behavior normal.         Lab Results   Component Value Date     04/24/2023    K 3.6 04/24/2023     04/24/2023    CO2 28.4 04/24/2023    BUN 11 04/24/2023    CREATININE 0.95 04/24/2023    GLUCOSE 121 (H) 04/24/2023    CALCIUM 9.4 04/24/2023    AST 14 04/24/2023    ALT 13 04/24/2023    ALKPHOS 93 04/24/2023     No results found for: \"CKTOTAL\"  Lab Results   Component Value Date    WBC 8.47 04/24/2023    HGB 14.9 04/24/2023    HCT 46.2 04/24/2023     04/24/2023     Lab Results   Component Value Date    INR 0.94 04/24/2023    INR 1.04 11/01/2022    INR 1.05 10/19/2022     Lab Results   Component Value Date    MG 2.3 10/19/2022     Lab Results   Component Value Date    TSH 2.230 10/18/2022    TRIG 96 10/18/2022    HDL 55 10/18/2022    LDL 85 10/18/2022      No results found for: \"BNP\"          ECG 12 Lead    Date/Time: 10/5/2023 8:50 AM  Performed by: Baylee Chaudhary APRN    Authorized by: Baylee Chaudhary APRN  Comparison: compared with previous ECG   Comparison to previous ECG: Now SA node wenckebach noted  Rhythm comments: SA node wenckebach  BPM: 62  Comments:  ms  QTc 425 " ms  EKG reviewed with Dr. Anderson            Assessment & Plan   Diagnosis Plan   1. Paroxysmal atrial fibrillation  ECG 12 Lead    Holter Monitor - 72 Hour Up To 15 Days    apixaban (ELIQUIS) 5 MG tablet tablet    Basic Metabolic Panel    TSH    Magnesium      2. Premature atrial contractions  ECG 12 Lead    Holter Monitor - 72 Hour Up To 15 Days    Basic Metabolic Panel    TSH    Magnesium      3. Essential hypertension  ECG 12 Lead    Basic Metabolic Panel    TSH    Magnesium      4. ASCVD (arteriosclerotic cardiovascular disease)  ECG 12 Lead    Basic Metabolic Panel    TSH    Magnesium                   Recommendations:    Will continue with flecainide and metoprolol at the current doses.  Will evaluate her PAC/ A fib burden with a 7 day holter.  Will arrange follow up with EP for further evaluation and treatment.  Follow up in 6 weeks or sooner if needed.        Return in about 6 weeks (around 11/16/2023) for Recheck.    As always, I appreciate very much the opportunity to participate in the cardiovascular care of your patients.      With Best Regards,    GAYLE Haskins

## 2023-10-05 NOTE — PROGRESS NOTES
Lawrence Faulkner DO  Ivis Vela  1955  10/05/2023    Patient Active Problem List   Diagnosis    Hypertension    Asthma    Osteoporosis    Left arm pain    Closed nondisplaced fracture of head of radius with routine healing    Palpitations    Dizziness and giddiness    Unstable angina    Paroxysmal atrial fibrillation    PVC's (premature ventricular contractions)    Premature atrial contractions    VT (ventricular tachycardia)       Dear Lawrence Faulkner DO:    Subjective     Chief Complaint   Patient presents with    Follow-up     ROUTINE           History of Present Illness:    Ivis Vela is a 68 y.o. female with a past medical history of nonobstructive coronary artery disease and paroxysmal atrial fibrillation. She presents today for routine cardiology follow-up. She has had increased palpitations recently causing shortness of breath and chest discomfort. She has tolerated Eliquis well with no bleeding issues. No syncope or dizziness.          No Known Allergies:      Current Outpatient Medications:     apixaban (ELIQUIS) 5 MG tablet tablet, Take 1 tablet by mouth 2 (Two) Times a Day., Disp: 56 tablet, Rfl: 0    aspirin 81 MG EC tablet, Take 1 tablet by mouth Daily., Disp: , Rfl:     atorvastatin (LIPITOR) 80 MG tablet, Take 1 tablet by mouth Daily., Disp: 90 tablet, Rfl: 3    flecainide (TAMBOCOR) 50 MG tablet, Take 1 tablet by mouth 2 (Two) Times a Day., Disp: 60 tablet, Rfl: 11    isosorbide mononitrate (IMDUR) 60 MG 24 hr tablet, Take 1 tablet by mouth Daily., Disp: 30 tablet, Rfl: 5    losartan-hydrochlorothiazide (HYZAAR) 100-12.5 MG per tablet, Take 0.5 tablets by mouth Daily., Disp: , Rfl:     metoprolol tartrate (LOPRESSOR) 25 MG tablet, Take 0.5 tablets by mouth 2 (Two) Times a Day for 30 days., Disp: 30 tablet, Rfl: 0    nitroglycerin (NITROSTAT) 0.4 MG SL tablet, 1 under the tongue as needed for angina, may repeat q5mins for up three doses, Disp: 25 tablet, Rfl: 2    pantoprazole (PROTONIX) 40 MG  "EC tablet, Take 1 tablet by mouth Daily., Disp: , Rfl:     alendronate (FOSAMAX) 70 MG tablet, Take 1 tablet by mouth Every 7 (Seven) Days. (Patient not taking: Reported on 10/5/2023), Disp: , Rfl:     amitriptyline (ELAVIL) 25 MG tablet, Take 1 tablet by mouth Every Night. (Patient not taking: Reported on 10/5/2023), Disp: , Rfl:       The following portions of the patient's history were reviewed and updated as appropriate: allergies, current medications, past family history, past medical history, past social history, past surgical history and problem list.    Social History     Tobacco Use    Smoking status: Former     Types: Electronic Cigarette, Cigarettes     Quit date:      Years since quittin.7    Smokeless tobacco: Never   Vaping Use    Vaping Use: Never used   Substance Use Topics    Alcohol use: No    Drug use: No       Review of Systems   Constitutional: Negative for decreased appetite and malaise/fatigue.   Cardiovascular:  Positive for chest pain, dyspnea on exertion and palpitations.   Respiratory:  Negative for cough and shortness of breath.        Objective   Vitals:    10/05/23 0847   BP: 137/78   BP Location: Left arm   Patient Position: Sitting   Cuff Size: Adult   Pulse: 65   Resp: 18   SpO2: 100%   Weight: 80.7 kg (178 lb)   Height: 162.6 cm (64\")     Body mass index is 30.55 kg/mý.        Vitals reviewed.   Constitutional:       Appearance: Healthy appearance. Well-developed and not in distress.   HENT:      Head: Normocephalic and atraumatic.   Neck:      Vascular: No carotid bruit or JVD.   Pulmonary:      Effort: Pulmonary effort is normal.      Breath sounds: Normal breath sounds. No wheezing. No rales.   Cardiovascular:      Normal rate. Irregularly irregular rhythm.      Murmurs: There is no murmur.      . No S3 and S4 gallop.   Edema:     Peripheral edema absent.   Abdominal:      General: Bowel sounds are normal.      Palpations: Abdomen is soft.   Skin:     General: Skin is " "warm and dry.   Neurological:      Mental Status: Alert, oriented to person, place, and time and oriented to person, place and time.   Psychiatric:         Mood and Affect: Mood normal.         Behavior: Behavior normal.         Lab Results   Component Value Date     04/24/2023    K 3.6 04/24/2023     04/24/2023    CO2 28.4 04/24/2023    BUN 11 04/24/2023    CREATININE 0.95 04/24/2023    GLUCOSE 121 (H) 04/24/2023    CALCIUM 9.4 04/24/2023    AST 14 04/24/2023    ALT 13 04/24/2023    ALKPHOS 93 04/24/2023     No results found for: \"CKTOTAL\"  Lab Results   Component Value Date    WBC 8.47 04/24/2023    HGB 14.9 04/24/2023    HCT 46.2 04/24/2023     04/24/2023     Lab Results   Component Value Date    INR 0.94 04/24/2023    INR 1.04 11/01/2022    INR 1.05 10/19/2022     Lab Results   Component Value Date    MG 2.3 10/19/2022     Lab Results   Component Value Date    TSH 2.230 10/18/2022    TRIG 96 10/18/2022    HDL 55 10/18/2022    LDL 85 10/18/2022      No results found for: \"BNP\"          ECG 12 Lead    Date/Time: 10/5/2023 8:50 AM  Performed by: Baylee Chaudhary APRN    Authorized by: Baylee Chaudhary APRN  Comparison: compared with previous ECG   Comparison to previous ECG: Now SA node wenckebach noted  Rhythm comments: SA node wenckebach  BPM: 62  Comments:  ms  QTc 425 ms  EKG reviewed with Dr. Anderson            Assessment & Plan    Diagnosis Plan   1. Paroxysmal atrial fibrillation  ECG 12 Lead    Holter Monitor - 72 Hour Up To 15 Days    apixaban (ELIQUIS) 5 MG tablet tablet    Basic Metabolic Panel    TSH    Magnesium      2. Premature atrial contractions  ECG 12 Lead    Holter Monitor - 72 Hour Up To 15 Days    Basic Metabolic Panel    TSH    Magnesium      3. Essential hypertension  ECG 12 Lead    Basic Metabolic Panel    TSH    Magnesium      4. ASCVD (arteriosclerotic cardiovascular disease)  ECG 12 Lead    Basic Metabolic Panel    TSH    Magnesium               "     Recommendations:    Will continue with flecainide and metoprolol at the current doses.  Will evaluate her PAC/ A fib burden with a 7 day holter.  Will arrange follow up with EP for further evaluation and treatment.  Follow up in 6 weeks or sooner if needed.        Return in about 6 weeks (around 11/16/2023) for Recheck.    As always, I appreciate very much the opportunity to participate in the cardiovascular care of your patients.      With Best Regards,    Baylee Chaudhary, GAYLE

## 2023-10-17 RX ORDER — FLECAINIDE ACETATE 50 MG/1
50 TABLET ORAL 2 TIMES DAILY
Qty: 60 TABLET | Refills: 11 | Status: SHIPPED | OUTPATIENT
Start: 2023-10-17

## 2023-10-24 ENCOUNTER — OFFICE VISIT (OUTPATIENT)
Dept: CARDIOLOGY | Facility: CLINIC | Age: 68
End: 2023-10-24
Payer: MEDICARE

## 2023-10-24 VITALS
DIASTOLIC BLOOD PRESSURE: 70 MMHG | WEIGHT: 179.8 LBS | OXYGEN SATURATION: 97 % | HEIGHT: 65 IN | BODY MASS INDEX: 29.96 KG/M2 | HEART RATE: 82 BPM | SYSTOLIC BLOOD PRESSURE: 130 MMHG

## 2023-10-24 DIAGNOSIS — I49.1 PREMATURE ATRIAL CONTRACTIONS: ICD-10-CM

## 2023-10-24 DIAGNOSIS — I47.20 VT (VENTRICULAR TACHYCARDIA): ICD-10-CM

## 2023-10-24 DIAGNOSIS — I48.0 PAROXYSMAL ATRIAL FIBRILLATION: Primary | ICD-10-CM

## 2023-10-24 PROCEDURE — 93000 ELECTROCARDIOGRAM COMPLETE: CPT

## 2023-10-24 PROCEDURE — 1159F MED LIST DOCD IN RCRD: CPT

## 2023-10-24 PROCEDURE — 99214 OFFICE O/P EST MOD 30 MIN: CPT

## 2023-10-24 PROCEDURE — 1160F RVW MEDS BY RX/DR IN RCRD: CPT

## 2023-10-24 PROCEDURE — 3075F SYST BP GE 130 - 139MM HG: CPT

## 2023-10-24 PROCEDURE — 3078F DIAST BP <80 MM HG: CPT

## 2023-10-24 NOTE — PROGRESS NOTES
Cardiac Electrophysiology Outpatient Note  [facility] at [facility]    Office Visit     Ivis Vela  [id number]  10/24/2023    Primary Care Physician: Lawrence Faulkner DO    Referred By: No ref. provider found    Subjective     Chief Complaint   Patient presents with    Paroxysmal atrial fibrillation       History of Present Illness:   Ivis Vela is a 68 y.o. female who presents to my electrophysiology clinic for follow up of paroxysmal atrial fibrillation, NSVT and PACs. She reports that she has had worsening of her palpitations over the past couple weeks to months associated with shortness of breath. These episodes are near daily and can happen several times throughout the day. We started her on flecainide to suppress these at our last visit in November. She presented to her primary cardiologist's office with these complaints and a Holter was placed for 7 days. The study was largely unremarkable with no atrial fibrillation, NSVT or other arrhythmia. There were 8% PACs. She had an [facility] last fall as well that showed nonobstructive coronary disease. Echo atthat time was also unremarkable with no signs of mitral valve disease.     Past Medical History:   Diagnosis Date    Arm fracture     Hyperlipidemia     Hypertension     Osteoporosis        Past Surgical History:   Procedure Laterality Date    CARDIAC CATHETERIZATION N/A 10/19/2022    Procedure: Left Heart Cath;  Surgeon: Piotr Edwards DO;  Location: [facility] CATH [facility]ASIVE LOC[facility];  Service: Cardiology;  Laterality: N/A;    CARPAL TUNNEL RELEASE Bilateral     CATARACT EXTRACTION      CHOLECYSTECTOMY      ESOPHAGOSCOPY W/ DILATION      HYSTERECTOMY         Family History   Problem Relation Age of Onset    Hypertension Mother     Heart disease Father     Hypertension Father     Hypertension Sister     Heart disease Brother     Hypertension Brother     Diabetes Brother     Breast cancer Paternal Cousin     Breast cancer Maternal  "Cousin        Social History     Socioeconomic History    Marital status:    Tobacco Use    Smoking status: Former     Types: Electronic Cigarette, Cigarettes     Quit date:      Years since quittin.8    Smokeless tobacco: Never   Vaping Use    Vaping Use: Never used   Substance and Sexual Activity    Alcohol use: No    Drug use: No    Sexual activity: Defer         Current Outpatient Medications:     apixaban (ELIQUIS) 5 MG tablet tablet, Take 1 tablet by mouth 2 (Two) Times a Day., Disp: 56 tablet, Rfl: 0    aspirin 81 MG EC tablet, Take 1 tablet by mouth Daily., Disp: , Rfl:     atorvastatin (LIPITOR) 80 MG tablet, Take 1 tablet by mouth Daily., Disp: 90 tablet, Rfl: 3    flecainide (TAMBOCOR) 50 MG tablet, TAKE 1 TABLET TWICE A DAY, Disp: 60 tablet, Rfl: 11    isosorbide mononitrate (IMDUR) 60 MG 24 hr tablet, Take 1 tablet by mouth Daily., Disp: 30 tablet, Rfl: 5    losartan-hydrochlorothiazide (HYZAAR) 100-12.5 MG per tablet, Take 0.5 tablets by mouth Daily., Disp: , Rfl:     nitroglycerin (NITROSTAT) 0.4 MG SL tablet, 1 under the tongue as needed for angina, may repeat q5mins for up three doses, Disp: 25 tablet, Rfl: 2    pantoprazole (PROTONIX) 40 MG EC tablet, Take 1 tablet by mouth Daily., Disp: , Rfl:     alendronate (FOSAMAX) 70 MG tablet, Take 1 tablet by mouth Every 7 (Seven) Days. (Patient not taking: Reported on 10/5/2023), Disp: , Rfl:     amitriptyline (ELAVIL) 25 MG tablet, Take 1 tablet by mouth Every Night. (Patient not taking: Reported on 10/5/2023), Disp: , Rfl:     metoprolol tartrate (LOPRESSOR) 25 MG tablet, Take 0.5 tablets by mouth 2 (Two) Times a Day for 30 days., Disp: 30 tablet, Rfl: 0    Allergies:   No Known Allergies    Objective   Vital Signs: Blood pressure 130/70, pulse 82, height 165.1 cm (65\"), weight 81.6 kg (179 lb 12.8 oz), SpO2 97%.    PHYSICAL EXAM  General appearance: Awake, alert, cooperative  Lungs: Clear to ascultation bilaterally  Heart: Regular rate " and irregular rhythm, no murmurs, 2+ LE pulses, no lower extremity swelling  Skin: Skin color, turgor normal, no rashes or lesions  Neurologic: Grossly normal     Lab Results   Component Value Date    GLUCOSE 121 (H) 04/24/2023    CALCIUM 9.4 04/24/2023     04/24/2023    K 3.6 04/24/2023    CO2 28.4 04/24/2023     04/24/2023    BUN 11 04/24/2023    CREATININE 0.95 04/24/2023    EGFRIFNONA 75 07/24/2020    BCR 11.6 04/24/2023    ANIONGAP 11.6 04/24/2023     Lab Results   Component Value Date    WBC 8.47 04/24/2023    HGB 14.9 04/24/2023    HCT 46.2 04/24/2023    MCV 92.6 04/24/2023     04/24/2023     Lab Results   Component Value Date    INR 0.94 04/24/2023    INR 1.04 11/01/2022    INR 1.05 10/19/2022    PROTIME 13.1 04/24/2023    PROTIME 13.9 11/01/2022    PROTIME 13.9 10/19/2022     Lab Results   Component Value Date    TSH 2.230 10/18/2022          Results for orders placed during the hospital encounter of 10/18/22    Adult Transthoracic Echo Complete w/ Color, Spectral and Contrast if necessary per protocol    Interpretation Summary    Left ventricular ejection fraction appears to be 51 - 55%.    Left ventricular wall thickness is consistent with mild to moderate septal asymmetric hypertrophy.    Left ventricular diastolic function is consistent with (grade I) impaired relaxation.    Estimated right ventricular systolic pressure from tricuspid regurgitation is normal (<35 mmHg).     Results for orders placed during the hospital encounter of 10/18/22    Cardiac Catheterization/Vascular Study    Narrative    Moderate CAD noted worse seen in the [facility] recertification      I personally viewed and interpreted the patient's EKG/Telemetry/lab data      ECG 12 Lead    Date/Time: 10/24/2023 2:13 PM  Performed by: Damion Day PA-C    Authorized by: Damion Day PA-C  Rhythm: sinus rhythm  Ectopy: atrial premature contractions  Rate: normal  BPM: 61  QRS axis: normal    Clinical  impression: abnormal EKG          Ivis Vela  reports that she quit smoking about 9 years ago. Her smoking use included electronic cigarette and cigarettes. She has never used smokeless tobacco.  Advance Care Planning   Advance Care Planning: ACP discussion was declined by the patient. Patient does not have an advance directive, information provided.     Assessment & Plan    1. Paroxysmal atrial fibrillation  CHADSVASC=3. Continue Eliquis.  On flecainiide with maintenance of sinus rhythm- no AF on recent 7d monitor.    2. Premature atrial contractions  8% burden. She was started on flecainide in November for these as she is having somewhat frequent perceived palpitations. Holter monitor to evaluate these palpitations was unremarkable with avg 62 bpm (41-91), no afib, no other arrhythmias, very rare PVCs. Patient was reassured that there is no pervasive electrical issue on Holter. I recommended taking an extra metoprolol 12.5 mg when she is having excessive symptoms. If this does not seem to work, we can always go up on her flecainide dosage but PACs are notoriously hard to suppress. There are no ablatable targets.    Continue metoprolol 12.5 mg bid + an extra dose prn for episodes of excessive perceived palpitations. Continue flecainide- QRS stable on EKG today.    3. VT (ventricular tachycardia)  No recurrence with nonobstructive cath last November. Continue to monitor.       Follow Up:  Return in about 7 months (around 6/7/2024).      Thank you for allowing me to participate in the care of your patient. Please do not hesitate to contact me with additional questions or concerns.      Damion Day PA-C  Cardiac Electrophysiologist  [facility] / [facility]

## 2023-11-09 DIAGNOSIS — I10 ESSENTIAL HYPERTENSION: ICD-10-CM

## 2023-11-09 DIAGNOSIS — Z82.49 FAMILY HISTORY OF PREMATURE CORONARY ARTERY DISEASE: ICD-10-CM

## 2023-11-09 DIAGNOSIS — R07.2 PRECORDIAL PAIN: ICD-10-CM

## 2023-11-09 RX ORDER — ISOSORBIDE MONONITRATE 60 MG/1
60 TABLET, EXTENDED RELEASE ORAL DAILY
Qty: 30 TABLET | Refills: 5 | OUTPATIENT
Start: 2023-11-09

## 2023-11-09 RX ORDER — ISOSORBIDE MONONITRATE 60 MG/1
60 TABLET, EXTENDED RELEASE ORAL DAILY
Qty: 30 TABLET | Refills: 5 | Status: SHIPPED | OUTPATIENT
Start: 2023-11-09

## 2023-11-09 NOTE — TELEPHONE ENCOUNTER
Caller: Dane Ivis Ga    Relationship: Self    Best call back number: [phone]    Requested Prescriptions:   Requested Prescriptions     Pending Prescriptions Disp Refills    isosorbide mononitrate (IMDUR) 60 MG 24 hr tablet 30 tablet 5     Sig: Take 1 tablet by mouth Daily.        Pharm[facility] be sent[facility] - [address] AT [address] TO [facility] - [phone]  - [phone] FX     Last office visit with prescribing clinician: 10/5/2023   Last telemedicine visit with prescribing clinician: Visit date not found   Next office visit with prescribing clinician: Visit date not found     Additional details provided by patient:     Does the patient have less than a 3 day supply:  [x] Yes  [] No    Clifford Carrero Rep   11/09/23 10:44 EST

## 2023-11-11 DIAGNOSIS — I25.10 ASCVD (ARTERIOSCLEROTIC CARDIOVASCULAR DISEASE): ICD-10-CM

## 2023-11-13 RX ORDER — ATORVASTATIN CALCIUM 80 MG/1
80 TABLET, FILM COATED ORAL DAILY
Qty: 90 TABLET | Refills: 3 | Status: SHIPPED | OUTPATIENT
Start: 2023-11-13

## 2023-11-30 ENCOUNTER — OFFICE VISIT (OUTPATIENT)
Dept: CARDIOLOGY | Facility: CLINIC | Age: 68
End: 2023-11-30
Payer: MEDICARE

## 2023-11-30 VITALS
HEIGHT: 65 IN | SYSTOLIC BLOOD PRESSURE: 117 MMHG | HEART RATE: 67 BPM | BODY MASS INDEX: 31.32 KG/M2 | OXYGEN SATURATION: 98 % | WEIGHT: 188 LBS | DIASTOLIC BLOOD PRESSURE: 72 MMHG

## 2023-11-30 DIAGNOSIS — I49.1 PREMATURE ATRIAL CONTRACTIONS: Primary | ICD-10-CM

## 2023-11-30 DIAGNOSIS — I48.0 PAROXYSMAL ATRIAL FIBRILLATION: ICD-10-CM

## 2023-11-30 DIAGNOSIS — I49.3 PVC'S (PREMATURE VENTRICULAR CONTRACTIONS): ICD-10-CM

## 2023-11-30 PROCEDURE — 99214 OFFICE O/P EST MOD 30 MIN: CPT | Performed by: PHYSICIAN ASSISTANT

## 2023-11-30 RX ORDER — FLECAINIDE ACETATE 100 MG/1
100 TABLET ORAL 2 TIMES DAILY
Qty: 60 TABLET | Refills: 2 | Status: SHIPPED | OUTPATIENT
Start: 2023-11-30

## 2023-11-30 NOTE — PROGRESS NOTES
Lawrence Faulkner DO  Ivis Vela  1955 11/30/2023    Patient Active Problem List   Diagnosis    Hypertension    Asthma    Osteoporosis    Left arm pain    Closed nondisplaced fracture of head of radius with routine healing    Palpitations    Dizziness and giddiness    Unstable angina    Paroxysmal atrial fibrillation    PVC's (premature ventricular contractions)    Premature atrial contractions    VT (ventricular tachycardia)       Dear Lawrence Faulkner DO:    Subjective     History of Present Illness:    Chief Complaint   Patient presents with    Follow-up     Routine with holter results       Ivis Vela is a pleasant 68 y.o. female with a past medical history significant for paroxysmal atrial fibrillation, nonobstructive CAD, and PVCs.  She comes in today for cardiology follow-up.    Ivis still reports frequent episodes of palpitations on a daily basis she reports these often occur and she will sit down and rest.  She denies any chest pains, shortness of breath, dizziness, or syncope.    No Known Allergies:      Current Outpatient Medications:     apixaban (ELIQUIS) 5 MG tablet tablet, Take 1 tablet by mouth 2 (Two) Times a Day., Disp: 56 tablet, Rfl: 0    aspirin 81 MG EC tablet, Take 1 tablet by mouth Daily., Disp: , Rfl:     atorvastatin (LIPITOR) 80 MG tablet, TAKE 1 TABLET DAILY, Disp: 90 tablet, Rfl: 3    flecainide (TAMBOCOR) 50 MG tablet, TAKE 1 TABLET TWICE A DAY, Disp: 60 tablet, Rfl: 11    isosorbide mononitrate (IMDUR) 60 MG 24 hr tablet, Take 1 tablet by mouth Daily., Disp: 30 tablet, Rfl: 5    losartan-hydrochlorothiazide (HYZAAR) 100-12.5 MG per tablet, Take 0.5 tablets by mouth Daily., Disp: , Rfl:     metoprolol tartrate (LOPRESSOR) 25 MG tablet, Take 0.5 tablets by mouth 2 (Two) Times a Day for 30 days., Disp: 30 tablet, Rfl: 0    nitroglycerin (NITROSTAT) 0.4 MG SL tablet, 1 under the tongue as needed for angina, may repeat q5mins for up three doses, Disp: 25 tablet, Rfl: 2     "pantoprazole (PROTONIX) 40 MG EC tablet, Take 1 tablet by mouth Daily., Disp: , Rfl:     alendronate (FOSAMAX) 70 MG tablet, Take 1 tablet by mouth Every 7 (Seven) Days. (Patient not taking: Reported on 10/5/2023), Disp: , Rfl:     amitriptyline (ELAVIL) 25 MG tablet, Take 1 tablet by mouth Every Night. (Patient not taking: Reported on 10/5/2023), Disp: , Rfl:     apixaban (ELIQUIS) 5 MG tablet tablet, Take 1 tablet by mouth 2 (Two) Times a Day., Disp: 56 tablet, Rfl: 0    The following portions of the patient's history were reviewed and updated as appropriate: allergies, current medications, past family history, past medical history, past social history, past surgical history and problem list.    Social History     Tobacco Use    Smoking status: Former     Types: Electronic Cigarette, Cigarettes     Quit date:      Years since quittin.9    Smokeless tobacco: Never   Vaping Use    Vaping Use: Never used   Substance Use Topics    Alcohol use: No    Drug use: No         Objective   Vitals:    23 1037   BP: 117/72   Pulse: 67   SpO2: 98%   Weight: 85.3 kg (188 lb)   Height: 165.1 cm (65\")     Body mass index is 31.28 kg/m².    ROS    Constitutional:       General: Not in acute distress.     Appearance: Healthy appearance. Well-developed and not in distress. Not diaphoretic.   Eyes:      Conjunctiva/sclera: Conjunctivae normal.      Pupils: Pupils are equal, round, and reactive to light.   HENT:      Head: Normocephalic and atraumatic.   Neck:      Vascular: No carotid bruit or JVD.   Pulmonary:      Effort: Pulmonary effort is normal. No respiratory distress.      Breath sounds: Normal breath sounds.   Cardiovascular:      Normal rate. Regular rhythm.   Edema:     Peripheral edema absent.   Skin:     General: Skin is cool.   Neurological:      Mental Status: Alert, oriented to person, place, and time and oriented to person, place and time.         Lab Results   Component Value Date     2023    " "K 3.6 04/24/2023     04/24/2023    CO2 28.4 04/24/2023    BUN 11 04/24/2023    CREATININE 0.95 04/24/2023    GLUCOSE 121 (H) 04/24/2023    CALCIUM 9.4 04/24/2023    AST 14 04/24/2023    ALT 13 04/24/2023    ALKPHOS 93 04/24/2023     No results found for: \"CKTOTAL\"  Lab Results   Component Value Date    WBC 8.47 04/24/2023    HGB 14.9 04/24/2023    HCT 46.2 04/24/2023     04/24/2023     Lab Results   Component Value Date    INR 0.94 04/24/2023    INR 1.04 11/01/2022    INR 1.05 10/19/2022     Lab Results   Component Value Date    MG 2.3 10/19/2022     Lab Results   Component Value Date    TSH 2.230 10/18/2022    TRIG 96 10/18/2022    HDL 55 10/18/2022    LDL 85 10/18/2022      No results found for: \"BNP\"    During this visit the following were done:  Labs Reviewed []    Labs Ordered []    Radiology Reports Reviewed []    Radiology Ordered []    PCP Records Reviewed []    Referring Provider Records Reviewed []    ER Records Reviewed []    Hospital Records Reviewed []    History Obtained From Family []    Radiology Images Reviewed []    Other Reviewed []    Records Requested []       Procedures    Assessment & Plan    Diagnosis Plan   1. Premature atrial contractions        2. Paroxysmal atrial fibrillation        3. PVC's (premature ventricular contractions)                 Recommendations:  Palpitations with known PVCs/PACs  Reports little to no improvement with flecainide although denies any adverse drug reactions to it.  Discussed about increasing flecainide further to 100 mg which was discussed at her recent EP visit as an option.  She was agreeable we will try increasing this I asked her to start the higher dose on 12/3/2023 and come in for an EKG in our office on 12/4 and 12/5.  Last QTc and QRS on EKG on 10/24/2023 was 386 and 94 respectively.    Return in about 2 months (around 1/30/2024).    As always, I appreciate very much the opportunity to participate in the cardiovascular care of your " patients.      With Best Regards,    Dieter Galloway PA-C

## 2023-12-04 ENCOUNTER — CLINICAL SUPPORT (OUTPATIENT)
Dept: CARDIOLOGY | Facility: CLINIC | Age: 68
End: 2023-12-04
Payer: MEDICARE

## 2023-12-04 DIAGNOSIS — I49.1 PREMATURE ATRIAL CONTRACTIONS: Primary | ICD-10-CM

## 2023-12-04 PROCEDURE — 93000 ELECTROCARDIOGRAM COMPLETE: CPT | Performed by: PHYSICIAN ASSISTANT

## 2023-12-04 NOTE — PROGRESS NOTES
EKG only visit after medication change    ECG 12 Lead    Date/Time: 12/4/2023 10:10 AM  Performed by: Dieter Galloway PA-C    Authorized by: Dieter Galloway PA-C  Comparison: compared with previous ECG   Similar to previous ECG  Rhythm: sinus rhythm  Conduction: non-specific intraventricular conduction delay    Clinical impression: non-specific ECG  Comments: QTc 396 with

## 2023-12-05 ENCOUNTER — CLINICAL SUPPORT (OUTPATIENT)
Dept: CARDIOLOGY | Facility: CLINIC | Age: 68
End: 2023-12-05
Payer: MEDICARE

## 2023-12-05 DIAGNOSIS — I49.1 PREMATURE ATRIAL CONTRACTIONS: Primary | ICD-10-CM

## 2023-12-05 PROCEDURE — 93000 ELECTROCARDIOGRAM COMPLETE: CPT | Performed by: PHYSICIAN ASSISTANT

## 2023-12-05 NOTE — PROGRESS NOTES
ECG 12 Lead    Date/Time: 12/5/2023 10:08 AM  Performed by: Dieter Galloway PA-C    Authorized by: Dieter Galloway PA-C  Comparison: compared with previous ECG   Similar to previous ECG  Rhythm: sinus rhythm  Ectopy: atrial premature contractions    Clinical impression: non-specific ECG  Comments: QTc 412  QRS: 100        Patient is bradycardic today, I advised her to stop metoprolol tartrate today.

## 2024-01-02 DIAGNOSIS — I48.0 PAROXYSMAL ATRIAL FIBRILLATION: ICD-10-CM

## 2024-02-21 ENCOUNTER — OFFICE VISIT (OUTPATIENT)
Dept: CARDIOLOGY | Facility: CLINIC | Age: 69
End: 2024-02-21
Payer: MEDICARE

## 2024-02-21 ENCOUNTER — TELEPHONE (OUTPATIENT)
Dept: CARDIOLOGY | Facility: CLINIC | Age: 69
End: 2024-02-21
Payer: MEDICARE

## 2024-02-21 VITALS
WEIGHT: 172 LBS | HEIGHT: 65 IN | HEART RATE: 79 BPM | BODY MASS INDEX: 28.66 KG/M2 | DIASTOLIC BLOOD PRESSURE: 60 MMHG | SYSTOLIC BLOOD PRESSURE: 107 MMHG | OXYGEN SATURATION: 99 %

## 2024-02-21 DIAGNOSIS — I10 ESSENTIAL HYPERTENSION: Primary | ICD-10-CM

## 2024-02-21 DIAGNOSIS — I25.10 ASCVD (ARTERIOSCLEROTIC CARDIOVASCULAR DISEASE): ICD-10-CM

## 2024-02-21 DIAGNOSIS — G47.33 OSA (OBSTRUCTIVE SLEEP APNEA): ICD-10-CM

## 2024-02-21 DIAGNOSIS — R00.2 PALPITATIONS: ICD-10-CM

## 2024-02-21 DIAGNOSIS — I48.0 PAROXYSMAL ATRIAL FIBRILLATION: ICD-10-CM

## 2024-02-21 PROCEDURE — 1160F RVW MEDS BY RX/DR IN RCRD: CPT | Performed by: NURSE PRACTITIONER

## 2024-02-21 PROCEDURE — 3074F SYST BP LT 130 MM HG: CPT | Performed by: NURSE PRACTITIONER

## 2024-02-21 PROCEDURE — 3078F DIAST BP <80 MM HG: CPT | Performed by: NURSE PRACTITIONER

## 2024-02-21 PROCEDURE — 1159F MED LIST DOCD IN RCRD: CPT | Performed by: NURSE PRACTITIONER

## 2024-02-21 PROCEDURE — 99214 OFFICE O/P EST MOD 30 MIN: CPT | Performed by: NURSE PRACTITIONER

## 2024-02-21 RX ORDER — LOSARTAN POTASSIUM AND HYDROCHLOROTHIAZIDE 12.5; 5 MG/1; MG/1
0.5 TABLET ORAL DAILY
Qty: 15 TABLET | Refills: 5 | Status: SHIPPED | OUTPATIENT
Start: 2024-02-21

## 2024-02-21 RX ORDER — FLECAINIDE ACETATE 100 MG/1
100 TABLET ORAL 2 TIMES DAILY
Qty: 60 TABLET | Refills: 5 | Status: SHIPPED | OUTPATIENT
Start: 2024-02-21

## 2024-02-21 NOTE — TELEPHONE ENCOUNTER
Requested  ----- Message from GAYLE Haskins sent at 2/21/2024 11:40 AM EST -----  Please get most recent labs from PCP. Thanks.

## 2024-02-21 NOTE — PROGRESS NOTES
"Lawrence Faulkner DO  Ivis Vela  1955 02/21/2024    Patient Active Problem List   Diagnosis    Hypertension    Asthma    Osteoporosis    Left arm pain    Closed nondisplaced fracture of head of radius with routine healing    Palpitations    Dizziness and giddiness    Unstable angina    Paroxysmal atrial fibrillation    PVC's (premature ventricular contractions)    Premature atrial contractions    VT (ventricular tachycardia)       Dear Lawrence Faulkner DO:    Subjective     Chief Complaint   Patient presents with    Med Management     List.     Follow-up     Routine f/up            History of Present Illness:    Ivis Vela is a 68 y.o. female with a past medical history of paroxysmal atrial fibrillation, nonobstructive CAD, PVC's, and symptomatic PAC's with an 8% burden on recent holter. She presents today for follow up. Previously, flecainide dose was increased. She continues to have palpitations and feels \"shaky.\" She is tired all the time. She often falls asleep throughout the day when sitting down. She could not tolerate metoprolol because her BP dropped too low.          No Known Allergies:      Current Outpatient Medications:     alendronate (FOSAMAX) 70 MG tablet, Take 1 tablet by mouth Every 7 (Seven) Days., Disp: , Rfl:     amitriptyline (ELAVIL) 25 MG tablet, Take 1 tablet by mouth Every Night., Disp: , Rfl:     apixaban (ELIQUIS) 5 MG tablet tablet, Take 1 tablet by mouth 2 (Two) Times a Day., Disp: 60 tablet, Rfl: 5    aspirin 81 MG EC tablet, Take 1 tablet by mouth Daily., Disp: , Rfl:     atorvastatin (LIPITOR) 80 MG tablet, TAKE 1 TABLET DAILY, Disp: 90 tablet, Rfl: 3    flecainide (TAMBOCOR) 100 MG tablet, Take 1 tablet by mouth 2 (Two) Times a Day., Disp: 60 tablet, Rfl: 5    isosorbide mononitrate (IMDUR) 60 MG 24 hr tablet, Take 1 tablet by mouth Daily., Disp: 30 tablet, Rfl: 5    metoprolol tartrate (LOPRESSOR) 25 MG tablet, Take 0.5 tablets by mouth 2 (Two) Times a Day., Disp: , " "Rfl:     nitroglycerin (NITROSTAT) 0.4 MG SL tablet, 1 under the tongue as needed for angina, may repeat q5mins for up three doses, Disp: 25 tablet, Rfl: 2    pantoprazole (PROTONIX) 40 MG EC tablet, Take 1 tablet by mouth Daily., Disp: , Rfl:     losartan-hydrochlorothiazide (Hyzaar) 50-12.5 MG per tablet, Take 0.5 tablets by mouth Daily., Disp: 15 tablet, Rfl: 5      The following portions of the patient's history were reviewed and updated as appropriate: allergies, current medications, past family history, past medical history, past social history, past surgical history and problem list.    Social History     Tobacco Use    Smoking status: Former     Types: Electronic Cigarette, Cigarettes     Quit date: 2014     Years since quitting: 10.1    Smokeless tobacco: Never   Vaping Use    Vaping Use: Never used   Substance Use Topics    Alcohol use: No    Drug use: No       Review of Systems   Constitutional: Positive for malaise/fatigue. Negative for decreased appetite.   Cardiovascular:  Positive for chest pain and palpitations. Negative for dyspnea on exertion.   Respiratory:  Negative for cough and shortness of breath.        Objective   Vitals:    02/21/24 1048   BP: 107/60   BP Location: Left arm   Patient Position: Sitting   Cuff Size: Adult   Pulse: 79   SpO2: 99%   Weight: 78 kg (172 lb)   Height: 165.1 cm (65\")     Body mass index is 28.62 kg/m².        Vitals reviewed.   Constitutional:       Appearance: Healthy appearance. Well-developed and not in distress.   HENT:      Head: Normocephalic and atraumatic.   Neck:      Vascular: No carotid bruit or JVD.   Pulmonary:      Effort: Pulmonary effort is normal.      Breath sounds: Normal breath sounds. No wheezing. No rales.   Cardiovascular:      Normal rate. Regular rhythm.      Murmurs: There is no murmur.      . No S3 and S4 gallop.   Edema:     Peripheral edema absent.   Abdominal:      General: Bowel sounds are normal.      Palpations: Abdomen is soft. " "  Skin:     General: Skin is warm and dry.   Neurological:      Mental Status: Alert, oriented to person, place, and time and oriented to person, place and time.   Psychiatric:         Mood and Affect: Mood normal.         Behavior: Behavior normal.         Lab Results   Component Value Date     04/24/2023    K 3.6 04/24/2023     04/24/2023    CO2 28.4 04/24/2023    BUN 11 04/24/2023    CREATININE 0.95 04/24/2023    GLUCOSE 121 (H) 04/24/2023    CALCIUM 9.4 04/24/2023    AST 14 04/24/2023    ALT 13 04/24/2023    ALKPHOS 93 04/24/2023     No results found for: \"CKTOTAL\"  Lab Results   Component Value Date    WBC 8.47 04/24/2023    HGB 14.9 04/24/2023    HCT 46.2 04/24/2023     04/24/2023     Lab Results   Component Value Date    INR 0.94 04/24/2023    INR 1.04 11/01/2022    INR 1.05 10/19/2022     Lab Results   Component Value Date    MG 2.3 10/19/2022     Lab Results   Component Value Date    TSH 2.230 10/18/2022    TRIG 96 10/18/2022    HDL 55 10/18/2022    LDL 85 10/18/2022      No results found for: \"BNP\"        Procedures      Assessment & Plan    Diagnosis Plan   1. Essential hypertension  losartan-hydrochlorothiazide (Hyzaar) 50-12.5 MG per tablet      2. Palpitations  metoprolol tartrate (LOPRESSOR) 25 MG tablet      3. RACHELLE (obstructive sleep apnea)  Home Sleep Study      4. Paroxysmal atrial fibrillation  apixaban (ELIQUIS) 5 MG tablet tablet    flecainide (TAMBOCOR) 100 MG tablet      5. ASCVD (arteriosclerotic cardiovascular disease)                     Recommendations:    Essential hypertension - will decrease dose of losartan HCTZ to allow more BP room for metoprolol.  Palpitations - likely secondary to PAC's. Resume metoprolol tartrate 12.5 mg BID. Previously labs were ordered including TSH and magnesium. She thinks she had this done at PCP office. Will request results.  RACHELLE - symptoms concerning for RACHELLE. Home sleep study ordered.  PAF - continue with Eliquis and flecainide.  ASCVD - " continue low dose aspirin, atorvastatin, and isosorbide.  Follow up in 3 months  or sooner if needed.        Return in about 3 months (around 5/21/2024) for Recheck.    As always, I appreciate very much the opportunity to participate in the cardiovascular care of your patients.      With Best Regards,    GAYLE Haskins

## 2024-02-21 NOTE — LETTER
"February 21, 2024     Lawrence Faulkner DO  [address] [place] [address] Drive  [address]  [place] [id number]    Patient: Ivis Vela   YOB: 1955   Date of Visit: 2/21/2024     Dear Lawrence Faulkner DO:       Thank you for referring Ivis Vela to me for evaluation. Below are the relevant portions of my assessment and plan of care.    If you have questions, please do not hesitate to call me. I look forward to following Ivis along with you.         Sincerely,        GAYLE Haskins        CC: No Recipients    Baylee Chaudhary APRN  02/21/24 1317  Sign when Signing Visit  Lawrence Faulkner DO  Ivis Vela  1955 02/21/2024    Patient Active Problem List   Diagnosis   • Hypertension   • Asthma   • Osteoporosis   • Left arm pain   • Closed nondisplaced fracture of head of radius with routine healing   • Palpitations   • Dizziness and giddiness   • Unstable angina   • Paroxysmal atrial fibrillation   • PVC's (premature ventricular contractions)   • Premature atrial contractions   • VT (ventricular tachycardia)       Dear Lawrence Faulkner DO:    Subjective    Chief Complaint   Patient presents with   • Med Management     List.    • Follow-up     Routine f/up            History of Present Illness:    Ivis Vela is a 68 y.o. female with a past medical history of paroxysmal atrial fibrillation, nonobstructive CAD, PVC's, and symptomatic PAC's with an 8% burden on recent holter. She presents today for follow up. Previously, flecainide dose was increased. She continues to have palpitations and feels \"shaky.\" She is tired all the time. She often falls asleep throughout the day when sitting down. She could not tolerate metoprolol because her BP dropped too low.          No Known Allergies:      Current Outpatient Medications:   •  alendronate (FOSAMAX) 70 MG tablet, Take 1 tablet by mouth Every 7 (Seven) Days., Disp: , Rfl:   •  amitriptyline (ELAVIL) 25 MG tablet, Take 1 tablet by mouth Every Night., Disp: , " Rfl:   •  apixaban (ELIQUIS) 5 MG tablet tablet, Take 1 tablet by mouth 2 (Two) Times a Day., Disp: 60 tablet, Rfl: 5  •  aspirin 81 MG EC tablet, Take 1 tablet by mouth Daily., Disp: , Rfl:   •  atorvastatin (LIPITOR) 80 MG tablet, TAKE 1 TABLET DAILY, Disp: 90 tablet, Rfl: 3  •  flecainide (TAMBOCOR) 100 MG tablet, Take 1 tablet by mouth 2 (Two) Times a Day., Disp: 60 tablet, Rfl: 5  •  isosorbide mononitrate (IMDUR) 60 MG 24 hr tablet, Take 1 tablet by mouth Daily., Disp: 30 tablet, Rfl: 5  •  metoprolol tartrate (LOPRESSOR) 25 MG tablet, Take 0.5 tablets by mouth 2 (Two) Times a Day., Disp: , Rfl:   •  nitroglycerin (NITROSTAT) 0.4 MG SL tablet, 1 under the tongue as needed for angina, may repeat q5mins for up three doses, Disp: 25 tablet, Rfl: 2  •  pantoprazole (PROTONIX) 40 MG EC tablet, Take 1 tablet by mouth Daily., Disp: , Rfl:   •  losartan-hydrochlorothiazide (Hyzaar) 50-12.5 MG per tablet, Take 0.5 tablets by mouth Daily., Disp: 15 tablet, Rfl: 5      The following portions of the patient's history were reviewed and updated as appropriate: allergies, current medications, past family history, past medical history, past social history, past surgical history and problem list.    Social History     Tobacco Use   • Smoking status: Former     Types: Electronic Cigarette, Cigarettes     Quit date: 2014     Years since quitting: 10.1   • Smokeless tobacco: Never   Vaping Use   • Vaping Use: Never used   Substance Use Topics   • Alcohol use: No   • Drug use: No       Review of Systems   Constitutional: Positive for malaise/fatigue. Negative for decreased appetite.   Cardiovascular:  Positive for chest pain and palpitations. Negative for dyspnea on exertion.   Respiratory:  Negative for cough and shortness of breath.        Objective  Vitals:    02/21/24 1048   BP: 107/60   BP Location: Left arm   Patient Position: Sitting   Cuff Size: Adult   Pulse: 79   SpO2: 99%   Weight: 78 kg (172 lb)   Height: 165.1 cm  "(65\")     Body mass index is 28.62 kg/m².        Vitals reviewed.   Constitutional:       Appearance: Healthy appearance. Well-developed and not in distress.   HENT:      Head: Normocephalic and atraumatic.   Neck:      Vascular: No carotid bruit or JVD.   Pulmonary:      Effort: Pulmonary effort is normal.      Breath sounds: Normal breath sounds. No wheezing. No rales.   Cardiovascular:      Normal rate. Regular rhythm.      Murmurs: There is no murmur.      . No S3 and S4 gallop.   Edema:     Peripheral edema absent.   Abdominal:      General: Bowel sounds are normal.      Palpations: Abdomen is soft.   Skin:     General: Skin is warm and dry.   Neurological:      Mental Status: Alert, oriented to person, place, and time and oriented to person, place and time.   Psychiatric:         Mood and Affect: Mood normal.         Behavior: Behavior normal.         Lab Results   Component Value Date     04/24/2023    K 3.6 04/24/2023     04/24/2023    CO2 28.4 04/24/2023    BUN 11 04/24/2023    CREATININE 0.95 04/24/2023    GLUCOSE 121 (H) 04/24/2023    CALCIUM 9.4 04/24/2023    AST 14 04/24/2023    ALT 13 04/24/2023    ALKPHOS 93 04/24/2023     No results found for: \"CKTOTAL\"  Lab Results   Component Value Date    WBC 8.47 04/24/2023    HGB 14.9 04/24/2023    HCT 46.2 04/24/2023     04/24/2023     Lab Results   Component Value Date    INR 0.94 04/24/2023    INR 1.04 11/01/2022    INR 1.05 10/19/2022     Lab Results   Component Value Date    MG 2.3 10/19/2022     Lab Results   Component Value Date    TSH 2.230 10/18/2022    TRIG 96 10/18/2022    HDL 55 10/18/2022    LDL 85 10/18/2022      No results found for: \"BNP\"        Procedures      Assessment & Plan   Diagnosis Plan   1. Essential hypertension  losartan-hydrochlorothiazide (Hyzaar) 50-12.5 MG per tablet      2. Palpitations  metoprolol tartrate (LOPRESSOR) 25 MG tablet      3. RACHELLE (obstructive sleep apnea)  Home Sleep Study      4. Paroxysmal " atrial fibrillation  apixaban (ELIQUIS) 5 MG tablet tablet    flecainide (TAMBOCOR) 100 MG tablet      5. ASCVD (arteriosclerotic cardiovascular disease)                     Recommendations:    Essential hypertension - will decrease dose of losartan HCTZ to allow more BP room for metoprolol.  Palpitations - likely secondary to PAC's. Resume metoprolol tartrate 12.5 mg BID. Previously labs were ordered including TSH and magnesium. She thinks she had this done at PCP office. Will request results.  RACHELLE - symptoms concerning for RACHELLE. Home sleep study ordered.  PAF - continue with Eliquis and flecainide.  ASCVD - continue low dose aspirin, atorvastatin, and isosorbide.  Follow up in 3 months  or sooner if needed.        Return in about 3 months (around 5/21/2024) for Recheck.    As always, I appreciate very much the opportunity to participate in the cardiovascular care of your patients.      With Best Regards,    GAYLE Haskins

## 2024-02-29 ENCOUNTER — TELEPHONE (OUTPATIENT)
Dept: CARDIOLOGY | Facility: CLINIC | Age: 69
End: 2024-02-29

## 2024-02-29 ENCOUNTER — APPOINTMENT (OUTPATIENT)
Dept: GENERAL RADIOLOGY | Facility: HOSPITAL | Age: 69
End: 2024-02-29
Payer: MEDICARE

## 2024-02-29 ENCOUNTER — APPOINTMENT (OUTPATIENT)
Dept: CT IMAGING | Facility: HOSPITAL | Age: 69
End: 2024-02-29
Payer: MEDICARE

## 2024-02-29 ENCOUNTER — HOSPITAL ENCOUNTER (EMERGENCY)
Facility: HOSPITAL | Age: 69
Discharge: HOME | End: 2024-02-29
Attending: STUDENT IN AN ORGANIZED HEALTH CARE EDUCATION/TRAINING PROGRAM | Admitting: STUDENT IN AN ORGANIZED HEALTH CARE EDUCATION/TRAINING PROGRAM
Payer: MEDICARE

## 2024-02-29 VITALS
HEIGHT: 64 IN | BODY MASS INDEX: 29.02 KG/M2 | OXYGEN SATURATION: 95 % | WEIGHT: 170 LBS | SYSTOLIC BLOOD PRESSURE: 113 MMHG | RESPIRATION RATE: 16 BRPM | TEMPERATURE: 97.9 F | HEART RATE: 53 BPM | DIASTOLIC BLOOD PRESSURE: 75 MMHG

## 2024-02-29 DIAGNOSIS — R42 DIZZINESS: ICD-10-CM

## 2024-02-29 DIAGNOSIS — N39.0 UTI (URINARY TRACT INFECTION), UNCOMPLICATED: Primary | ICD-10-CM

## 2024-02-29 LAB
ALBUMIN SERPL-MCNC: 3.8 G/DL (ref 3.5–5.2)
ALBUMIN/GLOB SERPL: 1.5 G/DL
ALP SERPL-CCNC: 73 U/L (ref 39–117)
ALT SERPL W P-5'-P-CCNC: 14 U/L (ref 1–33)
ANION GAP SERPL CALCULATED.3IONS-SCNC: 9.4 MMOL/L (ref 5–15)
AST SERPL-CCNC: 16 U/L (ref 1–32)
BACTERIA UR QL AUTO: ABNORMAL /HPF
BASOPHILS # BLD AUTO: 0.02 10*3/MM3 (ref 0–0.2)
BASOPHILS NFR BLD AUTO: 0.2 % (ref 0–1.5)
BILIRUB SERPL-MCNC: 0.3 MG/DL (ref 0–1.2)
BILIRUB UR QL STRIP: NEGATIVE
BUN SERPL-MCNC: 17 MG/DL (ref 8–23)
BUN/CREAT SERPL: 17 (ref 7–25)
CALCIUM SPEC-SCNC: 9.4 MG/DL (ref 8.6–10.5)
CHLORIDE SERPL-SCNC: 107 MMOL/L (ref 98–107)
CLARITY UR: ABNORMAL
CO2 SERPL-SCNC: 24.6 MMOL/L (ref 22–29)
COD CRY URNS QL: ABNORMAL /HPF
COLOR UR: YELLOW
CREAT SERPL-MCNC: 1 MG/DL (ref 0.57–1)
CRP SERPL-MCNC: <0.3 MG/DL (ref 0–0.5)
D DIMER PPP FEU-MCNC: <0.27 MCGFEU/ML (ref 0–0.67)
DEPRECATED RDW RBC AUTO: 44.6 FL (ref 37–54)
EGFRCR SERPLBLD CKD-EPI 2021: 61.9 ML/MIN/1.73
EOSINOPHIL # BLD AUTO: 0.01 10*3/MM3 (ref 0–0.4)
EOSINOPHIL NFR BLD AUTO: 0.1 % (ref 0.3–6.2)
ERYTHROCYTE [DISTWIDTH] IN BLOOD BY AUTOMATED COUNT: 14.8 % (ref 12.3–15.4)
FLUAV RNA RESP QL NAA+PROBE: NOT DETECTED
FLUBV RNA RESP QL NAA+PROBE: NOT DETECTED
GEN 5 2HR TROPONIN T REFLEX: 13 NG/L
GLOBULIN UR ELPH-MCNC: 2.5 GM/DL
GLUCOSE SERPL-MCNC: 102 MG/DL (ref 65–99)
GLUCOSE UR STRIP-MCNC: NEGATIVE MG/DL
HCT VFR BLD AUTO: 31.5 % (ref 34–46.6)
HGB BLD-MCNC: 9.1 G/DL (ref 12–15.9)
HGB UR QL STRIP.AUTO: NEGATIVE
HOLD SPECIMEN: NORMAL
HYALINE CASTS UR QL AUTO: ABNORMAL /LPF
HYPOCHROMIA BLD QL: NORMAL
IMM GRANULOCYTES # BLD AUTO: 0.02 10*3/MM3 (ref 0–0.05)
IMM GRANULOCYTES NFR BLD AUTO: 0.2 % (ref 0–0.5)
KETONES UR QL STRIP: ABNORMAL
LEUKOCYTE ESTERASE UR QL STRIP.AUTO: ABNORMAL
LYMPHOCYTES # BLD AUTO: 1.71 10*3/MM3 (ref 0.7–3.1)
LYMPHOCYTES NFR BLD AUTO: 20.9 % (ref 19.6–45.3)
MCH RBC QN AUTO: 23.8 PG (ref 26.6–33)
MCHC RBC AUTO-ENTMCNC: 28.9 G/DL (ref 31.5–35.7)
MCV RBC AUTO: 82.2 FL (ref 79–97)
MONOCYTES # BLD AUTO: 0.85 10*3/MM3 (ref 0.1–0.9)
MONOCYTES NFR BLD AUTO: 10.4 % (ref 5–12)
NEUTROPHILS NFR BLD AUTO: 5.58 10*3/MM3 (ref 1.7–7)
NEUTROPHILS NFR BLD AUTO: 68.2 % (ref 42.7–76)
NITRITE UR QL STRIP: NEGATIVE
NRBC BLD AUTO-RTO: 0 /100 WBC (ref 0–0.2)
PH UR STRIP.AUTO: 6 [PH] (ref 5–8)
PLATELET # BLD AUTO: 425 10*3/MM3 (ref 140–450)
PMV BLD AUTO: 10.6 FL (ref 6–12)
POTASSIUM SERPL-SCNC: 4.1 MMOL/L (ref 3.5–5.2)
PROT SERPL-MCNC: 6.3 G/DL (ref 6–8.5)
PROT UR QL STRIP: ABNORMAL
QT INTERVAL: 436 MS
QTC INTERVAL: 428 MS
RBC # BLD AUTO: 3.83 10*6/MM3 (ref 3.77–5.28)
RBC # UR STRIP: ABNORMAL /HPF
REF LAB TEST METHOD: ABNORMAL
SARS-COV-2 RNA RESP QL NAA+PROBE: NOT DETECTED
SMALL PLATELETS BLD QL SMEAR: ADEQUATE
SODIUM SERPL-SCNC: 141 MMOL/L (ref 136–145)
SP GR UR STRIP: 1.02 (ref 1–1.03)
SQUAMOUS #/AREA URNS HPF: ABNORMAL /HPF
STOMATOCYTES BLD QL SMEAR: NORMAL
TROPONIN T DELTA: 2 NG/L
TROPONIN T SERPL HS-MCNC: 11 NG/L
UROBILINOGEN UR QL STRIP: ABNORMAL
WBC # UR STRIP: ABNORMAL /HPF
WBC NRBC COR # BLD AUTO: 8.19 10*3/MM3 (ref 3.4–10.8)

## 2024-02-29 PROCEDURE — 81001 URINALYSIS AUTO W/SCOPE: CPT | Performed by: PHYSICIAN ASSISTANT

## 2024-02-29 PROCEDURE — 85025 COMPLETE CBC W/AUTO DIFF WBC: CPT | Performed by: PHYSICIAN ASSISTANT

## 2024-02-29 PROCEDURE — 25010000002 CEFTRIAXONE PER 250 MG: Performed by: PHYSICIAN ASSISTANT

## 2024-02-29 PROCEDURE — 87086 URINE CULTURE/COLONY COUNT: CPT | Performed by: PHYSICIAN ASSISTANT

## 2024-02-29 PROCEDURE — 93005 ELECTROCARDIOGRAM TRACING: CPT | Performed by: PHYSICIAN ASSISTANT

## 2024-02-29 PROCEDURE — 71045 X-RAY EXAM CHEST 1 VIEW: CPT

## 2024-02-29 PROCEDURE — 85007 BL SMEAR W/DIFF WBC COUNT: CPT | Performed by: PHYSICIAN ASSISTANT

## 2024-02-29 PROCEDURE — 85379 FIBRIN DEGRADATION QUANT: CPT | Performed by: PHYSICIAN ASSISTANT

## 2024-02-29 PROCEDURE — 99284 EMERGENCY DEPT VISIT MOD MDM: CPT

## 2024-02-29 PROCEDURE — 96365 THER/PROPH/DIAG IV INF INIT: CPT

## 2024-02-29 PROCEDURE — 70450 CT HEAD/BRAIN W/O DYE: CPT

## 2024-02-29 PROCEDURE — 25810000003 SODIUM CHLORIDE 0.9 % SOLUTION: Performed by: PHYSICIAN ASSISTANT

## 2024-02-29 PROCEDURE — 36415 COLL VENOUS BLD VENIPUNCTURE: CPT

## 2024-02-29 PROCEDURE — 93010 ELECTROCARDIOGRAM REPORT: CPT | Performed by: SPECIALIST

## 2024-02-29 PROCEDURE — 86140 C-REACTIVE PROTEIN: CPT | Performed by: PHYSICIAN ASSISTANT

## 2024-02-29 PROCEDURE — 80053 COMPREHEN METABOLIC PANEL: CPT | Performed by: PHYSICIAN ASSISTANT

## 2024-02-29 PROCEDURE — 84484 ASSAY OF TROPONIN QUANT: CPT | Performed by: PHYSICIAN ASSISTANT

## 2024-02-29 PROCEDURE — 87636 SARSCOV2 & INF A&B AMP PRB: CPT | Performed by: PHYSICIAN ASSISTANT

## 2024-02-29 RX ORDER — SODIUM CHLORIDE 0.9 % (FLUSH) 0.9 %
10 SYRINGE (ML) INJECTION AS NEEDED
Status: DISCONTINUED | OUTPATIENT
Start: 2024-02-29 | End: 2024-02-29 | Stop reason: HOSPADM

## 2024-02-29 RX ORDER — CEFDINIR 300 MG/1
300 CAPSULE ORAL 2 TIMES DAILY
Qty: 10 CAPSULE | Refills: 0 | Status: SHIPPED | OUTPATIENT
Start: 2024-02-29

## 2024-02-29 RX ADMIN — SODIUM CHLORIDE 1000 ML: 9 INJECTION, SOLUTION INTRAVENOUS at 15:10

## 2024-02-29 RX ADMIN — CEFTRIAXONE 1000 MG: 1 INJECTION, POWDER, FOR SOLUTION INTRAMUSCULAR; INTRAVENOUS at 15:20

## 2024-02-29 NOTE — TELEPHONE ENCOUNTER
Caller: Ivis Vela    Relationship to patient: Self    Best call back number: [phone]    Chief complaint: LOW BLOOD PRESSURE, PRESSURE IN YOUR CHEST    Patient directed to call 911 or go to their nearest emergency room.     Patient verbalized understanding: [] Yes  [x] No  If no, why?    Additional notes:WANTS TO SPEAK WITH THE OFFICE

## 2024-02-29 NOTE — Clinical Note
[facility] EMERGENCY DEPARTMENT  [address] [facility] [address]  Phone: [phone]    Ivis Vela was seen and treated in our emergency department on 2/29/2024.  She may return to work on 03/02/2024.         Thank you for choosing [facility].    Babatunde Duenas PA-C

## 2024-02-29 NOTE — ED PROVIDER NOTES
Subjective   History of Present Illness  68-year-old female with past medical history of osteoporosis, hypertension, hyperlipidemia presents to the emergency room with dizziness and chest pressure which she states began this day.  Patient states for the past 3 weeks she has felt very fatigued and weak.  She denies any chest tightness or squeezing.  She denies any shortness of breath, nausea, vomiting, diaphoresis, abdominal pain, or back pain.  She denies any specific aggravating or alleviating factors.  She does state that at times she feels like she has palpitations therefore cardiology recently put her back on her metoprolol and she states the palpitations has subsided.  She states that she is not sure if this has anything to do with her symptoms or not.  Denies any other complaints or concerns at this time.    History provided by:  Patient   used: No        Review of Systems   Constitutional: Negative.  Positive for fatigue. Negative for fever.   HENT: Negative.     Respiratory: Negative.     Cardiovascular: Negative.  Positive for chest pain.   Gastrointestinal: Negative.  Negative for abdominal pain.   Endocrine: Negative.    Genitourinary: Negative.  Negative for dysuria.   Skin: Negative.    Neurological: Negative.  Positive for dizziness.   Psychiatric/Behavioral: Negative.     All other systems reviewed and are negative.      Past Medical History:   Diagnosis Date    Arm fracture     Hyperlipidemia     Hypertension     Osteoporosis        No Known Allergies    Past Surgical History:   Procedure Laterality Date    CARDIAC CATHETERIZATION N/A 10/19/2022    Procedure: Left Heart Cath;  Surgeon: Piotr Edwards DO;  Location: [facility] CATH INVASIVE LOC[facility];  Service: Cardiology;  Laterality: N/A;    CARPAL TUNNEL RELEASE Bilateral     CATARACT EXTRACTION      CHOLECYSTECTOMY      ESOPHAGOSCOPY W/ DILATION      HYSTERECTOMY         Family History   Problem Relation Age of Onset    Hypertension  Mother     Heart disease Father     Hypertension Father     Hypertension Sister     Heart disease Brother     Hypertension Brother     Diabetes Brother     Breast cancer Paternal Cousin     Breast cancer Maternal Cousin        Social History     Socioeconomic History    Marital status:    Tobacco Use    Smoking status: Former     Types: Electronic Cigarette, Cigarettes     Quit date: 2014     Years since quitting: 10.1    Smokeless tobacco: Never   Vaping Use    Vaping Use: Never used   Substance and Sexual Activity    Alcohol use: No    Drug use: No    Sexual activity: Defer           Objective   Physical Exam  Vitals and nursing note reviewed.   Constitutional:       General: She is not in acute distress.     Appearance: She is well-developed. She is not diaphoretic.   HENT:      Head: Normocephalic and atraumatic.      Right Ear: External ear normal.      Left Ear: External ear normal.      Nose: Nose normal.   Eyes:      Conjunctiva/sclera: Conjunctivae normal.      Pupils: Pupils are equal, round, and reactive to light.   Neck:      Vascular: No JVD.      Trachea: No tracheal deviation.   Cardiovascular:      Rate and Rhythm: Normal rate and regular rhythm.      Heart sounds: Normal heart sounds. No murmur heard.  Pulmonary:      Effort: Pulmonary effort is normal. No respiratory distress.      Breath sounds: Normal breath sounds. No wheezing.   Abdominal:      General: Bowel sounds are normal.      Palpations: Abdomen is soft.      Tenderness: There is no abdominal tenderness.   Musculoskeletal:         General: No deformity. Normal range of motion.      Cervical back: Normal range of motion and neck supple.   Skin:     General: Skin is warm and dry.      Coloration: Skin is not pale.      Findings: No erythema or rash.   Neurological:      Mental Status: She is alert and oriented to person, place, and time.      Cranial Nerves: No cranial nerve deficit.   Psychiatric:         Behavior: Behavior normal.          Thought Content: Thought content normal.         Procedures           ED Course  ED Course as of 02/29/24 2107   Thu Feb 29, 2024   1256 EKG notes sinus bradycardia.  58 bpm.  No acute ST elevation.  QTc 428.  Electronically signed by Vahe Herrmann DO, 02/29/24, 12:57 PM EST.   [SF]   1404 D-Dimer, Quant: <0.27 [TK]   1505 CT Head Without Contrast [TK]   1505 XR Chest 1 View [TK]      ED Course User Index  [SF] Vahe Herrmann DO  [TK] Babatunde Duenas PA-C                                   Results for orders placed or performed during the hospital encounter of 02/29/24   COVID-19 and FLU A/B PCR, 1 HR TAT - Swab, Nasopharynx    Specimen: Nasopharynx; Swab   Result Value Ref Range    COVID19 Not Detected Not Detected - Ref. Range    Influenza A PCR Not Detected Not Detected    Influenza B PCR Not Detected Not Detected   Comprehensive Metabolic Panel    Specimen: Arm, Right; Blood   Result Value Ref Range    Glucose 102 (H) 65 - 99 mg/dL    BUN 17 8 - 23 mg/dL    Creatinine 1.00 0.57 - 1.00 mg/dL    Sodium 141 136 - 145 mmol/L    Potassium 4.1 3.5 - 5.2 mmol/L    Chloride 107 98 - 107 mmol/L    CO2 24.6 22.0 - 29.0 mmol/L    Calcium 9.4 8.6 - 10.5 mg/dL    Total Protein 6.3 6.0 - 8.5 g/dL    Albumin 3.8 3.5 - 5.2 g/dL    ALT (SGPT) 14 1 - 33 U/L    AST (SGOT) 16 1 - 32 U/L    Alkaline Phosphatase 73 39 - 117 U/L    Total Bilirubin 0.3 0.0 - 1.2 mg/dL    Globulin 2.5 gm/dL    A/G Ratio 1.5 g/dL    BUN/Creatinine Ratio 17.0 7.0 - 25.0    Anion Gap 9.4 5.0 - 15.0 mmol/L    eGFR 61.9 >60.0 mL/min/1.73   C-reactive Protein    Specimen: Arm, Right; Blood   Result Value Ref Range    C-Reactive Protein <0.30 0.00 - 0.50 mg/dL   Urinalysis With Microscopic If Indicated (No Culture) - Urine, Clean Catch    Specimen: Urine, Clean Catch   Result Value Ref Range    Color, UA Yellow Yellow, Straw    Appearance, UA Cloudy (A) Clear    pH, UA 6.0 5.0 - 8.0    Specific Gravity, UA 1.024 1.005 - 1.030    Glucose, UA  Negative Negative    Ketones, UA Trace (A) Negative    Bilirubin, UA Negative Negative    Blood, UA Negative Negative    Protein, UA Trace (A) Negative    Leuk Esterase, UA Moderate (2+) (A) Negative    Nitrite, UA Negative Negative    Urobilinogen, UA 0.2 E.U./dL 0.2 - 1.0 E.U./dL   High Sensitivity Troponin T    Specimen: Arm, Right; Blood   Result Value Ref Range    HS Troponin T 11 <14 ng/L   D-dimer, Quantitative    Specimen: Arm, Right; Blood   Result Value Ref Range    D-Dimer, Quantitative <0.27 0.00 - 0.67 MCGFEU/mL   CBC Auto Differential    Specimen: Arm, Right; Blood   Result Value Ref Range    WBC 8.19 3.40 - 10.80 10*3/mm3    RBC 3.83 3.77 - 5.28 10*6/mm3    Hemoglobin 9.1 (L) 12.0 - 15.9 g/dL    Hematocrit 31.5 (L) 34.0 - 46.6 %    MCV 82.2 79.0 - 97.0 fL    MCH 23.8 (L) 26.6 - 33.0 pg    MCHC 28.9 (L) 31.5 - 35.7 g/dL    RDW 14.8 12.3 - 15.4 %    RDW-SD 44.6 37.0 - 54.0 fl    MPV 10.6 6.0 - 12.0 fL    Platelets 425 140 - 450 10*3/mm3    Neutrophil % 68.2 42.7 - 76.0 %    Lymphocyte % 20.9 19.6 - 45.3 %    Monocyte % 10.4 5.0 - 12.0 %    Eosinophil % 0.1 (L) 0.3 - 6.2 %    Basophil % 0.2 0.0 - 1.5 %    Immature Grans % 0.2 0.0 - 0.5 %    Neutrophils, Absolute 5.58 1.70 - 7.00 10*3/mm3    Lymphocytes, Absolute 1.71 0.70 - 3.10 10*3/mm3    Monocytes, Absolute 0.85 0.10 - 0.90 10*3/mm3    Eosinophils, Absolute 0.01 0.00 - 0.40 10*3/mm3    Basophils, Absolute 0.02 0.00 - 0.20 10*3/mm3    Immature Grans, Absolute 0.02 0.00 - 0.05 10*3/mm3    nRBC 0.0 0.0 - 0.2 /100 WBC   Urinalysis, Microscopic Only - Urine, Clean Catch    Specimen: Urine, Clean Catch   Result Value Ref Range    RBC, UA 0-2 None Seen, 0-2 /HPF    WBC, UA 11-20 (A) None Seen, 0-2 /HPF    Bacteria, UA 1+ (A) None Seen /HPF    Squamous Epithelial Cells, UA 7-12 (A) None Seen, 0-2 /HPF    Hyaline Casts, UA 3-6 None Seen /LPF    Calcium Oxalate Crystals, UA Moderate/2+ None Seen /HPF    Methodology Manual Light Microscopy    Scan Slide     Specimen: Arm, Right; Blood   Result Value Ref Range    Hypochromia Slight/1+ None Seen    Stomatocytes Slight/1+ None Seen    Platelet Estimate Adequate Normal   [place] Urine Culture Tube - Urine, Clean Catch    Specimen: Urine, Clean Catch   Result Value Ref Range    Extra Tube Hold for add-ons.    High Sensitivity Troponin T 2Hr    Specimen: Arm, Right; Blood   Result Value Ref Range    HS Troponin T 13 <14 ng/L    Troponin T Delta 2 >=-4 - <+4 ng/L   ECG 12 Lead Other; dizziness   Result Value Ref Range    QT Interval 436 ms    QTC Interval 428 ms       XR Chest 1 View   Final Result     Unremarkable exam. No acute cardiopulmonary findings identified.           This report was finalized on 2/29/2024 2:52 PM by Dr. Troy Kay MD.          CT Head Without Contrast   Final Result   1.  No CT evidence of acute intracranial abnormality.   2.  Acute on chronic sphenoid sinusitis.   3.  Right mastoiditis.           This report was finalized on 2/29/2024 2:35 PM by Dr. Troy Kay MD.                          Medical Decision Making  68-year-old female with past medical history of osteoporosis, hypertension, hyperlipidemia presents to the emergency room with dizziness and chest pressure which she states began this day.  Patient states for the past 3 weeks she has felt very fatigued and weak.  She denies any chest tightness or squeezing.  She denies any shortness of breath, nausea, vomiting, diaphoresis, abdominal pain, or back pain.  She denies any specific aggravating or alleviating factors.  She does state that at times she feels like she has palpitations therefore cardiology recently put her back on her metoprolol and she states the palpitations has subsided.  She states that she is not sure if this has anything to do with her symptoms or not.  Denies any other complaints or concerns at this time.      Problems Addressed:  Dizziness: complicated acute illness or injury  UTI (urinary tract infection),  uncomplicated: complicated acute illness or injury    Amount and/or Complexity of Data Reviewed  Labs: ordered. Decision-making details documented in ED Course.  Radiology: ordered. Decision-making details documented in ED Course.  ECG/medicine tests: ordered. Decision-making details documented in ED Course.    Risk  Prescription drug management.        Final diagnoses:   UTI (urinary tract infection), uncomplicated   Dizziness       ED Disposition  ED Disposition       ED Disposition   Discharge    Condition   Stable    Comment   --               Lawrence Faulkner, DO  [address] [place] [address]  [address]  [place] [id number] [phone]    In 2 days           Medication List        New Prescriptions      cefdinir 300 MG capsule  Commonly known as: OMNICEF  Take 1 capsule by mouth 2 (Two) Times a Day.               Where to Get Your Medications        These medications were sent to [facility] [address] [place] [address] - [address] [place] [address] - [phone]  - [phone] [address] [address]      Phone: [phone]   cefdinir 300 MG capsule            Babatunde Duenas PANathanielC  02/29/24 0219

## 2024-03-01 LAB — BACTERIA SPEC AEROBE CULT: NORMAL

## 2024-03-01 NOTE — TELEPHONE ENCOUNTER
Patient went to ER and checked out okay ([facility], [place])  She said her BP is 80s/40 when there but they never did anything with this-and she has no improvement

## 2024-03-04 ENCOUNTER — TELEPHONE (OUTPATIENT)
Dept: CARDIOLOGY | Facility: CLINIC | Age: 69
End: 2024-03-04
Payer: MEDICARE

## 2024-03-04 NOTE — TELEPHONE ENCOUNTER
Pt called on Friday with a low blood pressure I did advise her to go to the er and she did but they didn't help with her bp told her she a UTI and sent her homar,she is still having issues with it and she said her head is swimmy I gave her an apt fot tomorrow

## 2024-03-04 NOTE — TELEPHONE ENCOUNTER
Spoke with pt she states she did decrease her medication. She states today 120/60 around 3pm today, but every other time it has been low, she has been feeling nauseated, dizzy, but states she is still having some chest pressure.

## 2024-03-05 ENCOUNTER — OFFICE VISIT (OUTPATIENT)
Dept: CARDIOLOGY | Facility: CLINIC | Age: 69
End: 2024-03-05
Payer: MEDICARE

## 2024-03-05 ENCOUNTER — LAB (OUTPATIENT)
Dept: LAB | Facility: HOSPITAL | Age: 69
End: 2024-03-05
Payer: MEDICARE

## 2024-03-05 VITALS
HEIGHT: 64 IN | WEIGHT: 173 LBS | BODY MASS INDEX: 29.53 KG/M2 | OXYGEN SATURATION: 97 % | HEART RATE: 52 BPM | SYSTOLIC BLOOD PRESSURE: 88 MMHG | DIASTOLIC BLOOD PRESSURE: 44 MMHG

## 2024-03-05 DIAGNOSIS — D64.9 ANEMIA, UNSPECIFIED TYPE: ICD-10-CM

## 2024-03-05 DIAGNOSIS — I48.0 PAROXYSMAL ATRIAL FIBRILLATION: ICD-10-CM

## 2024-03-05 DIAGNOSIS — I10 ESSENTIAL HYPERTENSION: ICD-10-CM

## 2024-03-05 DIAGNOSIS — I10 ESSENTIAL HYPERTENSION: Primary | ICD-10-CM

## 2024-03-05 DIAGNOSIS — D64.9 SYMPTOMATIC ANEMIA: Primary | ICD-10-CM

## 2024-03-05 DIAGNOSIS — K92.1 BLACK TARRY STOOLS: ICD-10-CM

## 2024-03-05 LAB
ALBUMIN SERPL-MCNC: 3.6 G/DL (ref 3.5–5.2)
ALBUMIN/GLOB SERPL: 1.6 G/DL
ALP SERPL-CCNC: 74 U/L (ref 39–117)
ALT SERPL W P-5'-P-CCNC: 14 U/L (ref 1–33)
ANION GAP SERPL CALCULATED.3IONS-SCNC: 8.6 MMOL/L (ref 5–15)
AST SERPL-CCNC: 13 U/L (ref 1–32)
BASOPHILS # BLD AUTO: 0.02 10*3/MM3 (ref 0–0.2)
BASOPHILS NFR BLD AUTO: 0.3 % (ref 0–1.5)
BILIRUB SERPL-MCNC: 0.4 MG/DL (ref 0–1.2)
BUN SERPL-MCNC: 14 MG/DL (ref 8–23)
BUN/CREAT SERPL: 14.9 (ref 7–25)
CALCIUM SPEC-SCNC: 8.9 MG/DL (ref 8.6–10.5)
CHLORIDE SERPL-SCNC: 106 MMOL/L (ref 98–107)
CO2 SERPL-SCNC: 25.4 MMOL/L (ref 22–29)
CREAT SERPL-MCNC: 0.94 MG/DL (ref 0.57–1)
DEPRECATED RDW RBC AUTO: 44 FL (ref 37–54)
EGFRCR SERPLBLD CKD-EPI 2021: 66.6 ML/MIN/1.73
EOSINOPHIL # BLD AUTO: 0.02 10*3/MM3 (ref 0–0.4)
EOSINOPHIL NFR BLD AUTO: 0.3 % (ref 0.3–6.2)
ERYTHROCYTE [DISTWIDTH] IN BLOOD BY AUTOMATED COUNT: 14.8 % (ref 12.3–15.4)
GLOBULIN UR ELPH-MCNC: 2.2 GM/DL
GLUCOSE SERPL-MCNC: 115 MG/DL (ref 65–99)
HCT VFR BLD AUTO: 29.4 % (ref 34–46.6)
HGB BLD-MCNC: 8.5 G/DL (ref 12–15.9)
HYPOCHROMIA BLD QL: NORMAL
IMM GRANULOCYTES # BLD AUTO: 0.03 10*3/MM3 (ref 0–0.05)
IMM GRANULOCYTES NFR BLD AUTO: 0.5 % (ref 0–0.5)
LARGE PLATELETS: NORMAL
LYMPHOCYTES # BLD AUTO: 0.45 10*3/MM3 (ref 0.7–3.1)
LYMPHOCYTES NFR BLD AUTO: 7.4 % (ref 19.6–45.3)
MCH RBC QN AUTO: 23.5 PG (ref 26.6–33)
MCHC RBC AUTO-ENTMCNC: 28.9 G/DL (ref 31.5–35.7)
MCV RBC AUTO: 81.4 FL (ref 79–97)
MONOCYTES # BLD AUTO: 0.54 10*3/MM3 (ref 0.1–0.9)
MONOCYTES NFR BLD AUTO: 8.9 % (ref 5–12)
NEUTROPHILS NFR BLD AUTO: 5.04 10*3/MM3 (ref 1.7–7)
NEUTROPHILS NFR BLD AUTO: 82.6 % (ref 42.7–76)
NRBC BLD AUTO-RTO: 0 /100 WBC (ref 0–0.2)
OVALOCYTES BLD QL SMEAR: NORMAL
PLATELET # BLD AUTO: 352 10*3/MM3 (ref 140–450)
PMV BLD AUTO: 10.3 FL (ref 6–12)
POTASSIUM SERPL-SCNC: 3.4 MMOL/L (ref 3.5–5.2)
PROT SERPL-MCNC: 5.8 G/DL (ref 6–8.5)
RBC # BLD AUTO: 3.61 10*6/MM3 (ref 3.77–5.28)
SODIUM SERPL-SCNC: 140 MMOL/L (ref 136–145)
WBC NRBC COR # BLD AUTO: 6.1 10*3/MM3 (ref 3.4–10.8)

## 2024-03-05 PROCEDURE — 3074F SYST BP LT 130 MM HG: CPT | Performed by: NURSE PRACTITIONER

## 2024-03-05 PROCEDURE — 1160F RVW MEDS BY RX/DR IN RCRD: CPT | Performed by: NURSE PRACTITIONER

## 2024-03-05 PROCEDURE — 36415 COLL VENOUS BLD VENIPUNCTURE: CPT

## 2024-03-05 PROCEDURE — 85025 COMPLETE CBC W/AUTO DIFF WBC: CPT

## 2024-03-05 PROCEDURE — 3078F DIAST BP <80 MM HG: CPT | Performed by: NURSE PRACTITIONER

## 2024-03-05 PROCEDURE — 85007 BL SMEAR W/DIFF WBC COUNT: CPT

## 2024-03-05 PROCEDURE — 99214 OFFICE O/P EST MOD 30 MIN: CPT | Performed by: NURSE PRACTITIONER

## 2024-03-05 PROCEDURE — 80053 COMPREHEN METABOLIC PANEL: CPT

## 2024-03-05 PROCEDURE — 1159F MED LIST DOCD IN RCRD: CPT | Performed by: NURSE PRACTITIONER

## 2024-03-05 NOTE — PROGRESS NOTES
Lawrence Faulkner DO  Ivis Vela  1955 03/05/2024    Patient Active Problem List   Diagnosis    Hypertension    Asthma    Osteoporosis    Left arm pain    Closed nondisplaced fracture of head of radius with routine healing    Palpitations    Dizziness and giddiness    Unstable angina    Paroxysmal atrial fibrillation    PVC's (premature ventricular contractions)    Premature atrial contractions    VT (ventricular tachycardia)       Dear Lawrence Faulkner DO:    Subjective     Chief Complaint   Patient presents with    Med Management     Verbal.     Dizziness    Fatigue           History of Present Illness:    Ivis Vela is a 68 y.o. female with a past medical history of paroxysmal atrial fibrillation on Eliquis, nonobstructive CAD, PVCs, and symptomatic PACs with an 8% burden on recent Holter.  She presents today for cardiology follow-up.  She reports on 2/29/2024 she went to the ED with fatigue, dizziness, and hypotension.  She was diagnosed with urinary tract infection and prescribed antibiotics.  She has continued to have the symptoms and is now feeling worse.  She denies any syncopal episodes.  Her blood pressure continues to run low.  She denies any bleeding issues with the Eliquis.  She did wake up this morning with nasal congestion and sinus pressure.          No Known Allergies:      Current Outpatient Medications:     alendronate (FOSAMAX) 70 MG tablet, Take 1 tablet by mouth Every 7 (Seven) Days., Disp: , Rfl:     amitriptyline (ELAVIL) 25 MG tablet, Take 1 tablet by mouth Every Night., Disp: , Rfl:     apixaban (ELIQUIS) 5 MG tablet tablet, Take 1 tablet by mouth 2 (Two) Times a Day., Disp: 60 tablet, Rfl: 5    aspirin 81 MG EC tablet, Take 1 tablet by mouth Daily., Disp: , Rfl:     atorvastatin (LIPITOR) 80 MG tablet, TAKE 1 TABLET DAILY, Disp: 90 tablet, Rfl: 3    cefdinir (OMNICEF) 300 MG capsule, Take 1 capsule by mouth 2 (Two) Times a Day., Disp: 10 capsule, Rfl: 0    flecainide (TAMBOCOR)  "100 MG tablet, Take 1 tablet by mouth 2 (Two) Times a Day., Disp: 60 tablet, Rfl: 5    isosorbide mononitrate (IMDUR) 60 MG 24 hr tablet, Take 1 tablet by mouth Daily., Disp: 30 tablet, Rfl: 5    metoprolol tartrate (LOPRESSOR) 25 MG tablet, Take 0.5 tablets by mouth 2 (Two) Times a Day., Disp: , Rfl:     nitroglycerin (NITROSTAT) 0.4 MG SL tablet, 1 under the tongue as needed for angina, may repeat q5mins for up three doses, Disp: 25 tablet, Rfl: 2    pantoprazole (PROTONIX) 40 MG EC tablet, Take 1 tablet by mouth Daily., Disp: , Rfl:       The following portions of the patient's history were reviewed and updated as appropriate: allergies, current medications, past family history, past medical history, past social history, past surgical history and problem list.    Social History     Tobacco Use    Smoking status: Former     Current packs/day: 0.00     Types: Electronic Cigarette, Cigarettes     Quit date: 2014     Years since quitting: 10.1    Smokeless tobacco: Never   Vaping Use    Vaping status: Never Used   Substance Use Topics    Alcohol use: No    Drug use: No       Review of Systems   Constitutional: Positive for malaise/fatigue. Negative for decreased appetite.   Cardiovascular:  Negative for dyspnea on exertion and palpitations.   Respiratory:  Negative for cough and shortness of breath.    Neurological:  Positive for dizziness.       Objective   Vitals:    03/05/24 0948 03/05/24 0950   BP: (!) 87/53 (!) 88/44   BP Location: Left arm Right arm   Patient Position: Sitting Sitting   Cuff Size: Adult Adult   Pulse: 57 52   SpO2: 97% 97%   Weight: 78.5 kg (173 lb) 78.5 kg (173 lb)   Height: 162.6 cm (64\") 162.6 cm (64\")     Body mass index is 29.7 kg/m².        Vitals reviewed.   Constitutional:       Appearance: Healthy appearance. Well-developed and not in distress.   HENT:      Head: Normocephalic and atraumatic.   Neck:      Vascular: No carotid bruit or JVD.   Pulmonary:      Effort: Pulmonary effort is " "normal.      Breath sounds: Normal breath sounds. No wheezing. No rales.   Cardiovascular:      Bradycardia present. Regular rhythm.      Murmurs: There is no murmur.      . No S3 and S4 gallop.   Edema:     Peripheral edema absent.   Abdominal:      General: Bowel sounds are normal.      Palpations: Abdomen is soft.   Skin:     General: Skin is warm and dry.   Neurological:      Mental Status: Alert, oriented to person, place, and time and oriented to person, place and time.   Psychiatric:         Mood and Affect: Mood normal.         Behavior: Behavior normal.         Lab Results   Component Value Date     02/29/2024    K 4.1 02/29/2024     02/29/2024    CO2 24.6 02/29/2024    BUN 17 02/29/2024    CREATININE 1.00 02/29/2024    GLUCOSE 102 (H) 02/29/2024    CALCIUM 9.4 02/29/2024    AST 16 02/29/2024    ALT 14 02/29/2024    ALKPHOS 73 02/29/2024     No results found for: \"CKTOTAL\"  Lab Results   Component Value Date    WBC 8.19 02/29/2024    HGB 9.1 (L) 02/29/2024    HCT 31.5 (L) 02/29/2024     02/29/2024     Lab Results   Component Value Date    INR 0.94 04/24/2023    INR 1.04 11/01/2022    INR 1.05 10/19/2022     Lab Results   Component Value Date    MG 2.3 10/19/2022     Lab Results   Component Value Date    TSH 2.230 10/18/2022    TRIG 96 10/18/2022    HDL 55 10/18/2022    LDL 85 10/18/2022      No results found for: \"BNP\"        Procedures      Assessment & Plan    Diagnosis Plan   1. Essential hypertension  CBC & Differential    Comprehensive Metabolic Panel      2. Paroxysmal atrial fibrillation  CBC & Differential    Comprehensive Metabolic Panel      3. Anemia, unspecified type  CBC & Differential    Comprehensive Metabolic Panel                   Recommendations:    Her BP continues to run low despite recently decreasing Hyzaar dosage.  She did take her blood pressure medication this morning.  Will discontinue Hyzaar for now.  She will hold metoprolol this evening.  I have asked her to " increase her fluid intake and eat more salt today.  After reviewing ER records, her hemoglobin has dropped significantly over the past year.  Last hemoglobin in April 2023 was 14.9 and now 9.1.  Will repeat CBC today.  If anemia persists, may have to consider holding Eliquis and getting GI evaluation.  I did ask her to see her PCP today to evaluate upper respiratory symptoms and rule out COVID/flu.  If her symptoms worsen, I asked her to go to the ED. She was agreeable.  Follow-up in 2 weeks as scheduled or sooner if needed.        Return for Next scheduled follow up.    As always, I appreciate very much the opportunity to participate in the cardiovascular care of your patients.      With Best Regards,    GAYLE Haskins

## 2024-03-05 NOTE — LETTER
March 5, 2024     Lawrence Faulkner DO  [address] [place] [address] Drive  [address]  [place] [id number]    Patient: Ivis Vela   YOB: 1955   Date of Visit: 3/5/2024     Dear Lawrence Faulkner DO:       Thank you for referring Ivis Vela to me for evaluation. Below are the relevant portions of my assessment and plan of care.    If you have questions, please do not hesitate to call me. I look forward to following Ivis along with you.         Sincerely,        GAYLE Haskins        CC: No Baylee Johnson APRN  03/05/24 1127  Sign when Signing Visit  Lawrence Faulkner DO  Ivis Vela  1955 03/05/2024    Patient Active Problem List   Diagnosis   • Hypertension   • Asthma   • Osteoporosis   • Left arm pain   • Closed nondisplaced fracture of head of radius with routine healing   • Palpitations   • Dizziness and giddiness   • Unstable angina   • Paroxysmal atrial fibrillation   • PVC's (premature ventricular contractions)   • Premature atrial contractions   • VT (ventricular tachycardia)       Dear Lawrence Faulkner DO:    Subjective    Chief Complaint   Patient presents with   • Med Management     Verbal.    • Dizziness   • Fatigue           History of Present Illness:    Ivis Vela is a 68 y.o. female with a past medical history of paroxysmal atrial fibrillation on Eliquis, nonobstructive CAD, PVCs, and symptomatic PACs with an 8% burden on recent Holter.  She presents today for cardiology follow-up.  She reports on 2/29/2024 she went to the ED with fatigue, dizziness, and hypotension.  She was diagnosed with urinary tract infection and prescribed antibiotics.  She has continued to have the symptoms and is now feeling worse.  She denies any syncopal episodes.  Her blood pressure continues to run low.  She denies any bleeding issues with the Eliquis.  She did wake up this morning with nasal congestion and sinus pressure.          No Known Allergies:      Current Outpatient  Medications:   •  alendronate (FOSAMAX) 70 MG tablet, Take 1 tablet by mouth Every 7 (Seven) Days., Disp: , Rfl:   •  amitriptyline (ELAVIL) 25 MG tablet, Take 1 tablet by mouth Every Night., Disp: , Rfl:   •  apixaban (ELIQUIS) 5 MG tablet tablet, Take 1 tablet by mouth 2 (Two) Times a Day., Disp: 60 tablet, Rfl: 5  •  aspirin 81 MG EC tablet, Take 1 tablet by mouth Daily., Disp: , Rfl:   •  atorvastatin (LIPITOR) 80 MG tablet, TAKE 1 TABLET DAILY, Disp: 90 tablet, Rfl: 3  •  cefdinir (OMNICEF) 300 MG capsule, Take 1 capsule by mouth 2 (Two) Times a Day., Disp: 10 capsule, Rfl: 0  •  flecainide (TAMBOCOR) 100 MG tablet, Take 1 tablet by mouth 2 (Two) Times a Day., Disp: 60 tablet, Rfl: 5  •  isosorbide mononitrate (IMDUR) 60 MG 24 hr tablet, Take 1 tablet by mouth Daily., Disp: 30 tablet, Rfl: 5  •  metoprolol tartrate (LOPRESSOR) 25 MG tablet, Take 0.5 tablets by mouth 2 (Two) Times a Day., Disp: , Rfl:   •  nitroglycerin (NITROSTAT) 0.4 MG SL tablet, 1 under the tongue as needed for angina, may repeat q5mins for up three doses, Disp: 25 tablet, Rfl: 2  •  pantoprazole (PROTONIX) 40 MG EC tablet, Take 1 tablet by mouth Daily., Disp: , Rfl:       The following portions of the patient's history were reviewed and updated as appropriate: allergies, current medications, past family history, past medical history, past social history, past surgical history and problem list.    Social History     Tobacco Use   • Smoking status: Former     Current packs/day: 0.00     Types: Electronic Cigarette, Cigarettes     Quit date: 2014     Years since quitting: 10.1   • Smokeless tobacco: Never   Vaping Use   • Vaping status: Never Used   Substance Use Topics   • Alcohol use: No   • Drug use: No       Review of Systems   Constitutional: Positive for malaise/fatigue. Negative for decreased appetite.   Cardiovascular:  Negative for dyspnea on exertion and palpitations.   Respiratory:  Negative for cough and shortness of breath.   "  Neurological:  Positive for dizziness.       Objective  Vitals:    03/05/24 0948 03/05/24 0950   BP: (!) 87/53 (!) 88/44   BP Location: Left arm Right arm   Patient Position: Sitting Sitting   Cuff Size: Adult Adult   Pulse: 57 52   SpO2: 97% 97%   Weight: 78.5 kg (173 lb) 78.5 kg (173 lb)   Height: 162.6 cm (64\") 162.6 cm (64\")     Body mass index is 29.7 kg/m².        Vitals reviewed.   Constitutional:       Appearance: Healthy appearance. Well-developed and not in distress.   HENT:      Head: Normocephalic and atraumatic.   Neck:      Vascular: No carotid bruit or JVD.   Pulmonary:      Effort: Pulmonary effort is normal.      Breath sounds: Normal breath sounds. No wheezing. No rales.   Cardiovascular:      Bradycardia present. Regular rhythm.      Murmurs: There is no murmur.      . No S3 and S4 gallop.   Edema:     Peripheral edema absent.   Abdominal:      General: Bowel sounds are normal.      Palpations: Abdomen is soft.   Skin:     General: Skin is warm and dry.   Neurological:      Mental Status: Alert, oriented to person, place, and time and oriented to person, place and time.   Psychiatric:         Mood and Affect: Mood normal.         Behavior: Behavior normal.         Lab Results   Component Value Date     02/29/2024    K 4.1 02/29/2024     02/29/2024    CO2 24.6 02/29/2024    BUN 17 02/29/2024    CREATININE 1.00 02/29/2024    GLUCOSE 102 (H) 02/29/2024    CALCIUM 9.4 02/29/2024    AST 16 02/29/2024    ALT 14 02/29/2024    ALKPHOS 73 02/29/2024     No results found for: \"CKTOTAL\"  Lab Results   Component Value Date    WBC 8.19 02/29/2024    HGB 9.1 (L) 02/29/2024    HCT 31.5 (L) 02/29/2024     02/29/2024     Lab Results   Component Value Date    INR 0.94 04/24/2023    INR 1.04 11/01/2022    INR 1.05 10/19/2022     Lab Results   Component Value Date    MG 2.3 10/19/2022     Lab Results   Component Value Date    TSH 2.230 10/18/2022    TRIG 96 10/18/2022    HDL 55 10/18/2022    LDL " "85 10/18/2022      No results found for: \"BNP\"        Procedures      Assessment & Plan   Diagnosis Plan   1. Essential hypertension  CBC & Differential    Comprehensive Metabolic Panel      2. Paroxysmal atrial fibrillation  CBC & Differential    Comprehensive Metabolic Panel      3. Anemia, unspecified type  CBC & Differential    Comprehensive Metabolic Panel                   Recommendations:    Her BP continues to run low despite recently decreasing Hyzaar dosage.  She did take her blood pressure medication this morning.  Will discontinue Hyzaar for now.  She will hold metoprolol this evening.  I have asked her to increase her fluid intake and eat more salt today.  After reviewing ER records, her hemoglobin has dropped significantly over the past year.  Last hemoglobin in April 2023 was 14.9 and now 9.1.  Will repeat CBC today.  If anemia persists, may have to consider holding Eliquis and getting GI evaluation.  I did ask her to see her PCP today to evaluate upper respiratory symptoms and rule out COVID/flu.  If her symptoms worsen, I asked her to go to the ED. She was agreeable.  Follow-up in 2 weeks as scheduled or sooner if needed.        Return for Next scheduled follow up.    As always, I appreciate very much the opportunity to participate in the cardiovascular care of your patients.      With Best Regards,    Baylee Chaudhary, GAYLE          "

## 2024-03-05 NOTE — PROGRESS NOTES
I called the patient to discuss CBC results. Her hemoglobin (see below) was 11.8 in January of 2024. She reports having black, tarry stool along with fatigue, dizziness, and hypotension over the last several weeks. CBC on 2/29/24 during ER encounter showed a hemoglobin of 9.1. CBC today shows hemoglobin of 8.5. I relayed this information to the patient and advised her to hold the Eliquis and low dose aspirin right now. Will refer to GI for urgent evaluation and endoscopies. She states her PCP prescribed an iron supplement, so she is now taking this as well. We did discuss stroke risk with holding Eliquis. She voiced understanding. Will ultimately need to refer to EP for Watchman device.     I discussed her plan of care with Dr. Anderson

## 2024-03-06 ENCOUNTER — HOSPITAL ENCOUNTER (EMERGENCY)
Facility: HOSPITAL | Age: 69
Discharge: HOME | End: 2024-03-06
Attending: STUDENT IN AN ORGANIZED HEALTH CARE EDUCATION/TRAINING PROGRAM | Admitting: STUDENT IN AN ORGANIZED HEALTH CARE EDUCATION/TRAINING PROGRAM
Payer: MEDICARE

## 2024-03-06 ENCOUNTER — TELEPHONE (OUTPATIENT)
Dept: CARDIOLOGY | Facility: CLINIC | Age: 69
End: 2024-03-06

## 2024-03-06 ENCOUNTER — APPOINTMENT (OUTPATIENT)
Dept: GENERAL RADIOLOGY | Facility: HOSPITAL | Age: 69
End: 2024-03-06
Payer: MEDICARE

## 2024-03-06 VITALS
OXYGEN SATURATION: 95 % | TEMPERATURE: 98.4 F | BODY MASS INDEX: 29.53 KG/M2 | RESPIRATION RATE: 18 BRPM | HEIGHT: 64 IN | WEIGHT: 173 LBS | HEART RATE: 70 BPM | DIASTOLIC BLOOD PRESSURE: 68 MMHG | SYSTOLIC BLOOD PRESSURE: 132 MMHG

## 2024-03-06 DIAGNOSIS — D64.9 ANEMIA, UNSPECIFIED TYPE: ICD-10-CM

## 2024-03-06 DIAGNOSIS — D64.9 SYMPTOMATIC ANEMIA: Primary | ICD-10-CM

## 2024-03-06 DIAGNOSIS — R53.83 FATIGUE, UNSPECIFIED TYPE: Primary | ICD-10-CM

## 2024-03-06 DIAGNOSIS — E87.6 HYPOKALEMIA: ICD-10-CM

## 2024-03-06 DIAGNOSIS — K92.1 BLACK TARRY STOOLS: ICD-10-CM

## 2024-03-06 LAB
ABO GROUP BLD: NORMAL
ABO GROUP BLD: NORMAL
ALBUMIN SERPL-MCNC: 3.8 G/DL (ref 3.5–5.2)
ALBUMIN/GLOB SERPL: 1.6 G/DL
ALP SERPL-CCNC: 78 U/L (ref 39–117)
ALT SERPL W P-5'-P-CCNC: 15 U/L (ref 1–33)
ANION GAP SERPL CALCULATED.3IONS-SCNC: 10.4 MMOL/L (ref 5–15)
AST SERPL-CCNC: 14 U/L (ref 1–32)
BASOPHILS # BLD AUTO: 0.02 10*3/MM3 (ref 0–0.2)
BASOPHILS NFR BLD AUTO: 0.4 % (ref 0–1.5)
BILIRUB SERPL-MCNC: 0.2 MG/DL (ref 0–1.2)
BILIRUB UR QL STRIP: NEGATIVE
BLD GP AB SCN SERPL QL: NEGATIVE
BUN SERPL-MCNC: 11 MG/DL (ref 8–23)
BUN/CREAT SERPL: 11.6 (ref 7–25)
CALCIUM SPEC-SCNC: 8.7 MG/DL (ref 8.6–10.5)
CHLORIDE SERPL-SCNC: 105 MMOL/L (ref 98–107)
CLARITY UR: ABNORMAL
CO2 SERPL-SCNC: 24.6 MMOL/L (ref 22–29)
COLOR UR: YELLOW
CREAT SERPL-MCNC: 0.95 MG/DL (ref 0.57–1)
DEPRECATED RDW RBC AUTO: 44 FL (ref 37–54)
EGFRCR SERPLBLD CKD-EPI 2021: 65.8 ML/MIN/1.73
EOSINOPHIL # BLD AUTO: 0.02 10*3/MM3 (ref 0–0.4)
EOSINOPHIL NFR BLD AUTO: 0.4 % (ref 0.3–6.2)
ERYTHROCYTE [DISTWIDTH] IN BLOOD BY AUTOMATED COUNT: 14.9 % (ref 12.3–15.4)
FERRITIN SERPL-MCNC: 40.97 NG/ML (ref 13–150)
GEN 5 2HR TROPONIN T REFLEX: 15 NG/L
GLOBULIN UR ELPH-MCNC: 2.4 GM/DL
GLUCOSE SERPL-MCNC: 144 MG/DL (ref 65–99)
GLUCOSE UR STRIP-MCNC: NEGATIVE MG/DL
HCT VFR BLD AUTO: 29.9 % (ref 34–46.6)
HGB BLD-MCNC: 8.8 G/DL (ref 12–15.9)
HGB UR QL STRIP.AUTO: NEGATIVE
HOLD SPECIMEN: NORMAL
HOLD SPECIMEN: NORMAL
IMM GRANULOCYTES # BLD AUTO: 0.01 10*3/MM3 (ref 0–0.05)
IMM GRANULOCYTES NFR BLD AUTO: 0.2 % (ref 0–0.5)
IRON 24H UR-MRATE: 17 MCG/DL (ref 37–145)
IRON SATN MFR SERPL: 4 % (ref 20–50)
KETONES UR QL STRIP: NEGATIVE
LEUKOCYTE ESTERASE UR QL STRIP.AUTO: NEGATIVE
LYMPHOCYTES # BLD AUTO: 0.84 10*3/MM3 (ref 0.7–3.1)
LYMPHOCYTES NFR BLD AUTO: 17.8 % (ref 19.6–45.3)
MCH RBC QN AUTO: 24 PG (ref 26.6–33)
MCHC RBC AUTO-ENTMCNC: 29.4 G/DL (ref 31.5–35.7)
MCV RBC AUTO: 81.7 FL (ref 79–97)
MONOCYTES # BLD AUTO: 0.51 10*3/MM3 (ref 0.1–0.9)
MONOCYTES NFR BLD AUTO: 10.8 % (ref 5–12)
NEUTROPHILS NFR BLD AUTO: 3.32 10*3/MM3 (ref 1.7–7)
NEUTROPHILS NFR BLD AUTO: 70.4 % (ref 42.7–76)
NITRITE UR QL STRIP: NEGATIVE
NRBC BLD AUTO-RTO: 0 /100 WBC (ref 0–0.2)
PH UR STRIP.AUTO: 6 [PH] (ref 5–8)
PLATELET # BLD AUTO: 342 10*3/MM3 (ref 140–450)
PMV BLD AUTO: 10.6 FL (ref 6–12)
POTASSIUM SERPL-SCNC: 3.2 MMOL/L (ref 3.5–5.2)
PROT SERPL-MCNC: 6.2 G/DL (ref 6–8.5)
PROT UR QL STRIP: NEGATIVE
RBC # BLD AUTO: 3.66 10*6/MM3 (ref 3.77–5.28)
RH BLD: POSITIVE
RH BLD: POSITIVE
SODIUM SERPL-SCNC: 140 MMOL/L (ref 136–145)
SP GR UR STRIP: 1.01 (ref 1–1.03)
T&S EXPIRATION DATE: NORMAL
TIBC SERPL-MCNC: 459 MCG/DL (ref 298–536)
TRANSFERRIN SERPL-MCNC: 308 MG/DL (ref 200–360)
TROPONIN T DELTA: -7 NG/L
TROPONIN T SERPL HS-MCNC: 22 NG/L
UROBILINOGEN UR QL STRIP: ABNORMAL
WBC NRBC COR # BLD AUTO: 4.72 10*3/MM3 (ref 3.4–10.8)
WHOLE BLOOD HOLD COAG: NORMAL
WHOLE BLOOD HOLD SPECIMEN: NORMAL

## 2024-03-06 PROCEDURE — 86901 BLOOD TYPING SEROLOGIC RH(D): CPT | Performed by: STUDENT IN AN ORGANIZED HEALTH CARE EDUCATION/TRAINING PROGRAM

## 2024-03-06 PROCEDURE — 93010 ELECTROCARDIOGRAM REPORT: CPT | Performed by: INTERNAL MEDICINE

## 2024-03-06 PROCEDURE — 99284 EMERGENCY DEPT VISIT MOD MDM: CPT

## 2024-03-06 PROCEDURE — 71045 X-RAY EXAM CHEST 1 VIEW: CPT

## 2024-03-06 PROCEDURE — 82728 ASSAY OF FERRITIN: CPT | Performed by: STUDENT IN AN ORGANIZED HEALTH CARE EDUCATION/TRAINING PROGRAM

## 2024-03-06 PROCEDURE — 86850 RBC ANTIBODY SCREEN: CPT | Performed by: STUDENT IN AN ORGANIZED HEALTH CARE EDUCATION/TRAINING PROGRAM

## 2024-03-06 PROCEDURE — 84484 ASSAY OF TROPONIN QUANT: CPT | Performed by: EMERGENCY MEDICINE

## 2024-03-06 PROCEDURE — 93005 ELECTROCARDIOGRAM TRACING: CPT | Performed by: EMERGENCY MEDICINE

## 2024-03-06 PROCEDURE — 84466 ASSAY OF TRANSFERRIN: CPT | Performed by: STUDENT IN AN ORGANIZED HEALTH CARE EDUCATION/TRAINING PROGRAM

## 2024-03-06 PROCEDURE — 85025 COMPLETE CBC W/AUTO DIFF WBC: CPT | Performed by: EMERGENCY MEDICINE

## 2024-03-06 PROCEDURE — 36415 COLL VENOUS BLD VENIPUNCTURE: CPT

## 2024-03-06 PROCEDURE — 86901 BLOOD TYPING SEROLOGIC RH(D): CPT

## 2024-03-06 PROCEDURE — 81003 URINALYSIS AUTO W/O SCOPE: CPT | Performed by: STUDENT IN AN ORGANIZED HEALTH CARE EDUCATION/TRAINING PROGRAM

## 2024-03-06 PROCEDURE — 80053 COMPREHEN METABOLIC PANEL: CPT | Performed by: EMERGENCY MEDICINE

## 2024-03-06 PROCEDURE — 83540 ASSAY OF IRON: CPT | Performed by: STUDENT IN AN ORGANIZED HEALTH CARE EDUCATION/TRAINING PROGRAM

## 2024-03-06 PROCEDURE — 86900 BLOOD TYPING SEROLOGIC ABO: CPT | Performed by: STUDENT IN AN ORGANIZED HEALTH CARE EDUCATION/TRAINING PROGRAM

## 2024-03-06 PROCEDURE — 86900 BLOOD TYPING SEROLOGIC ABO: CPT

## 2024-03-06 RX ORDER — POTASSIUM CHLORIDE 20 MEQ/1
40 TABLET, EXTENDED RELEASE ORAL ONCE
Status: COMPLETED | OUTPATIENT
Start: 2024-03-06 | End: 2024-03-06

## 2024-03-06 RX ORDER — ASPIRIN 325 MG
325 TABLET ORAL ONCE
Status: DISCONTINUED | OUTPATIENT
Start: 2024-03-06 | End: 2024-03-06

## 2024-03-06 RX ORDER — SODIUM CHLORIDE 0.9 % (FLUSH) 0.9 %
10 SYRINGE (ML) INJECTION AS NEEDED
Status: DISCONTINUED | OUTPATIENT
Start: 2024-03-06 | End: 2024-03-07 | Stop reason: HOSPADM

## 2024-03-06 RX ADMIN — POTASSIUM CHLORIDE 40 MEQ: 1500 TABLET, EXTENDED RELEASE ORAL at 20:46

## 2024-03-06 NOTE — TELEPHONE ENCOUNTER
Called patient and advised of Baylee instruction to go to er for further eval. Told her the worsening weakness and the headache could be indication of continued blood loss. Patient verb understanding and agreed would go to er.

## 2024-03-06 NOTE — TELEPHONE ENCOUNTER
I have placed an urgent referral to General surgery to evaluate for possible endoscopies. This appointment is pending. If her symptoms are worsening in the interim, she should go to the ER because anemia could be worse.

## 2024-03-06 NOTE — TELEPHONE ENCOUNTER
Patient called, feeling weaker today, although BP some improved top #'s 110-120, bottom 50's. Also  having ongoing headache throughout last night and today. Seen PCP with negative Covid and neg flu.

## 2024-03-07 ENCOUNTER — OFFICE VISIT (OUTPATIENT)
Dept: SURGERY | Facility: CLINIC | Age: 69
End: 2024-03-07
Payer: MEDICARE

## 2024-03-07 VITALS — HEART RATE: 62 BPM | DIASTOLIC BLOOD PRESSURE: 76 MMHG | SYSTOLIC BLOOD PRESSURE: 111 MMHG

## 2024-03-07 DIAGNOSIS — R23.8 CHANGE OF SKIN COLOR: ICD-10-CM

## 2024-03-07 DIAGNOSIS — L98.9 SKIN LESION: Primary | ICD-10-CM

## 2024-03-07 DIAGNOSIS — D64.9 ANEMIA, UNSPECIFIED TYPE: Primary | ICD-10-CM

## 2024-03-07 LAB
QT INTERVAL: 394 MS
QTC INTERVAL: 431 MS

## 2024-03-07 PROCEDURE — 3074F SYST BP LT 130 MM HG: CPT | Performed by: SURGERY

## 2024-03-07 PROCEDURE — 3078F DIAST BP <80 MM HG: CPT | Performed by: SURGERY

## 2024-03-07 PROCEDURE — 1159F MED LIST DOCD IN RCRD: CPT | Performed by: SURGERY

## 2024-03-07 PROCEDURE — 1160F RVW MEDS BY RX/DR IN RCRD: CPT | Performed by: SURGERY

## 2024-03-07 PROCEDURE — 99204 OFFICE O/P NEW MOD 45 MIN: CPT | Performed by: SURGERY

## 2024-03-07 RX ORDER — SODIUM CHLORIDE 9 MG/ML
40 INJECTION, SOLUTION INTRAVENOUS AS NEEDED
Status: CANCELLED | OUTPATIENT
Start: 2024-03-08

## 2024-03-07 RX ORDER — SODIUM CHLORIDE 0.9 % (FLUSH) 0.9 %
10 SYRINGE (ML) INJECTION AS NEEDED
Status: CANCELLED | OUTPATIENT
Start: 2024-03-08

## 2024-03-07 RX ORDER — SODIUM CHLORIDE 0.9 % (FLUSH) 0.9 %
10 SYRINGE (ML) INJECTION EVERY 12 HOURS SCHEDULED
Status: CANCELLED | OUTPATIENT
Start: 2024-03-08

## 2024-03-07 NOTE — H&P (VIEW-ONLY)
Date of Service: 3/7/2024    Subjective   Ivis Vela is a 68 y.o. female is being seen for consultation for anemia today at the request of Lawrence Faulkner DO    Chief complaint: Anemia    Ivis Vela is a 68 y.o.  female vape user, with history of paroxysmal atrial fibrillation and had been on Eliquis (last dose was 3/2), who presents my office with low hemoglobin.  She states she had some severe constipation around [place] time then resolved with stool softeners and noticed some dark discoloration of her stool but otherwise has normal bowel movements now.  She denies any melena or hematochezia currently.  She does not take any iron supplementation.  Denies NSAID use.  Reports lightheadedness but has not lost consciousness    Had an EGD and colonoscopy with Dr. Bruner 3 years ago.    Past Medical History:   Diagnosis Date    Arm fracture     Hyperlipidemia     Hypertension     Osteoporosis        Past Surgical History:   Procedure Laterality Date    CARDIAC CATHETERIZATION N/A 10/19/2022    Procedure: Left Heart Cath;  Surgeon: Piotr Edwards DO;  Location: [facility] CATH INVASIVE LOCATION;  Service: Cardiology;  Laterality: N/A;    CARPAL TUNNEL RELEASE Bilateral     CATARACT EXTRACTION      CHOLECYSTECTOMY      ESOPHAGOSCOPY W/ DILATION      HYSTERECTOMY           Family History   Problem Relation Age of Onset    Hypertension Mother     Heart disease Father     Hypertension Father     Hypertension Sister     Heart disease Brother     Hypertension Brother     Diabetes Brother     Breast cancer Paternal Cousin     Breast cancer Maternal Cousin          Social History     Socioeconomic History    Marital status:    Tobacco Use    Smoking status: Former     Current packs/day: 0.00     Types: Electronic Cigarette, Cigarettes     Quit date: 2014     Years since quitting: 10.1     Passive exposure: Past    Smokeless tobacco: Never   Vaping Use    Vaping status: Every Day    Substances: Nicotine   Substance  and Sexual Activity    Alcohol use: No    Drug use: No    Sexual activity: Defer                Review of Systems    14 point review of systems negative except for what is noted in HPI        Objective     Physical Exam:  There were no vitals filed for this visit. There is no height or weight on file to calculate BMI.  Constitution: /76   Pulse 62  . No acute distress   Head: Normocephalic, atraumatic.   Eyes: Aligned without strabismus. Conjunctivae noninjected   Ears, Nose, Mouth: no lesions appreciated   Respiratory: Symmetric chest expansion. No respiratory distress.   Gastrointestinal:  Soft, nondistended, nontender  Skin: Mild digital clubbing  Lymphatics: No abnormal cervical or supraclavicular adenopathy  Neurologic: No gross deficits.  Alert and oriented x3.  Mild tremor at rest  Psychiatric: Normal mood        Results:    Last hemoglobin 8.8      Ivis Vela is a 68 y.o.  female vape user with anemia    We discussed the risks and benefits of EGD and colonoscopy.  The patient would like to start with an EGD alone.  If negative, we can pursue a colonoscopy                   Rufino Cohen MD  [facility]

## 2024-03-07 NOTE — ED NOTES
"Patient advises she was sent to ED by her cardiologist. Patient states she was sent for \"losing blood, I was here on Thursday and my blood was a 9.1, then at my doctor yesterday it was 8.2 or something.\" Patient denies any visible blood. Patient denies chest pain, only complains of generalized weakness.   "

## 2024-03-07 NOTE — PROGRESS NOTES
Date of Service: 3/7/2024    Subjective   Ivis Vela is a 68 y.o. female is being seen for consultation for anemia today at the request of Lawrence Faulkner DO    Chief complaint: Anemia    Ivis Vela is a 68 y.o.  female vape user, with history of paroxysmal atrial fibrillation and had been on Eliquis (last dose was 3/2), who presents my office with low hemoglobin.  She states she had some severe constipation around [place] time then resolved with stool softeners and noticed some dark discoloration of her stool but otherwise has normal bowel movements now.  She denies any melena or hematochezia currently.  She does not take any iron supplementation.  Denies NSAID use.  Reports lightheadedness but has not lost consciousness    Had an EGD and colonoscopy with Dr. Bruner 3 years ago.    Past Medical History:   Diagnosis Date    Arm fracture     Hyperlipidemia     Hypertension     Osteoporosis        Past Surgical History:   Procedure Laterality Date    CARDIAC CATHETERIZATION N/A 10/19/2022    Procedure: Left Heart Cath;  Surgeon: Piotr Edwards DO;  Location: [facility] CATH INVASIVE LOCATION;  Service: Cardiology;  Laterality: N/A;    CARPAL TUNNEL RELEASE Bilateral     CATARACT EXTRACTION      CHOLECYSTECTOMY      ESOPHAGOSCOPY W/ DILATION      HYSTERECTOMY           Family History   Problem Relation Age of Onset    Hypertension Mother     Heart disease Father     Hypertension Father     Hypertension Sister     Heart disease Brother     Hypertension Brother     Diabetes Brother     Breast cancer Paternal Cousin     Breast cancer Maternal Cousin          Social History     Socioeconomic History    Marital status:    Tobacco Use    Smoking status: Former     Current packs/day: 0.00     Types: Electronic Cigarette, Cigarettes     Quit date: 2014     Years since quitting: 10.1     Passive exposure: Past    Smokeless tobacco: Never   Vaping Use    Vaping status: Every Day    Substances: Nicotine   Substance  and Sexual Activity    Alcohol use: No    Drug use: No    Sexual activity: Defer                Review of Systems    14 point review of systems negative except for what is noted in HPI        Objective     Physical Exam:  There were no vitals filed for this visit. There is no height or weight on file to calculate BMI.  Constitution: /76   Pulse 62  . No acute distress   Head: Normocephalic, atraumatic.   Eyes: Aligned without strabismus. Conjunctivae noninjected   Ears, Nose, Mouth: no lesions appreciated   Respiratory: Symmetric chest expansion. No respiratory distress.   Gastrointestinal:  Soft, nondistended, nontender  Skin: Mild digital clubbing  Lymphatics: No abnormal cervical or supraclavicular adenopathy  Neurologic: No gross deficits.  Alert and oriented x3.  Mild tremor at rest  Psychiatric: Normal mood        Results:    Last hemoglobin 8.8      Ivis Vela is a 68 y.o.  female vape user with anemia    We discussed the risks and benefits of EGD and colonoscopy.  The patient would like to start with an EGD alone.  If negative, we can pursue a colonoscopy                   Rufino Cohen MD  [facility]

## 2024-03-07 NOTE — H&P (VIEW-ONLY)
Date of Service: 3/7/2024    Subjective   Ivis Vela is a 68 y.o. female is being seen for consultation for anemia today at the request of Lawrence Faulkner DO    Chief complaint: Anemia    Ivis Vela is a 68 y.o.  female vape user, with history of paroxysmal atrial fibrillation and had been on Eliquis (last dose was 3/2), who presents my office with low hemoglobin.  She states she had some severe constipation around [place] time then resolved with stool softeners and noticed some dark discoloration of her stool but otherwise has normal bowel movements now.  She denies any melena or hematochezia currently.  She does not take any iron supplementation.  Denies NSAID use.  Reports lightheadedness but has not lost consciousness    Had an EGD and colonoscopy with Dr. Bruner 3 years ago.    Past Medical History:   Diagnosis Date    Arm fracture     Hyperlipidemia     Hypertension     Osteoporosis        Past Surgical History:   Procedure Laterality Date    CARDIAC CATHETERIZATION N/A 10/19/2022    Procedure: Left Heart Cath;  Surgeon: Piotr Edwards DO;  Location: [facility] CATH INVASIVE LOCATION;  Service: Cardiology;  Laterality: N/A;    CARPAL TUNNEL RELEASE Bilateral     CATARACT EXTRACTION      CHOLECYSTECTOMY      ESOPHAGOSCOPY W/ DILATION      HYSTERECTOMY           Family History   Problem Relation Age of Onset    Hypertension Mother     Heart disease Father     Hypertension Father     Hypertension Sister     Heart disease Brother     Hypertension Brother     Diabetes Brother     Breast cancer Paternal Cousin     Breast cancer Maternal Cousin          Social History     Socioeconomic History    Marital status:    Tobacco Use    Smoking status: Former     Current packs/day: 0.00     Types: Electronic Cigarette, Cigarettes     Quit date: 2014     Years since quitting: 10.1     Passive exposure: Past    Smokeless tobacco: Never   Vaping Use    Vaping status: Every Day    Substances: Nicotine   Substance  and Sexual Activity    Alcohol use: No    Drug use: No    Sexual activity: Defer                Review of Systems    14 point review of systems negative except for what is noted in HPI        Objective     Physical Exam:  There were no vitals filed for this visit. There is no height or weight on file to calculate BMI.  Constitution: /76   Pulse 62  . No acute distress   Head: Normocephalic, atraumatic.   Eyes: Aligned without strabismus. Conjunctivae noninjected   Ears, Nose, Mouth: no lesions appreciated   Respiratory: Symmetric chest expansion. No respiratory distress.   Gastrointestinal:  Soft, nondistended, nontender  Skin: Mild digital clubbing  Lymphatics: No abnormal cervical or supraclavicular adenopathy  Neurologic: No gross deficits.  Alert and oriented x3.  Mild tremor at rest  Psychiatric: Normal mood        Results:    Last hemoglobin 8.8      Ivis Vela is a 68 y.o.  female vape user with anemia    We discussed the risks and benefits of EGD and colonoscopy.  The patient would like to start with an EGD alone.  If negative, we can pursue a colonoscopy                   Rufino Cohen MD  [facility]

## 2024-03-07 NOTE — DISCHARGE INSTRUCTIONS
Follow-up with your primary care provider, return to the ED if you notice any dark stools or bleeding.  Take iron supplements as directed.

## 2024-03-07 NOTE — ED PROVIDER NOTES
Subjective   History of Present Illness  68-year-old female past medical history of hypertension presents to the ED with worsening fatigue over the past several weeks.  Patient states she is found to have her blood counts she thinks her blood counts are dropping.  Has not had any black stools or bloody stools.  Patient was recently taken off of apixaban for A-fib as they were concerned about possible bleeding and patient has not been having A-fib very often.  Patient states she was also taken off of her high blood pressure medication because her blood pressure was dropping low and now it has been normal today.  She denies any chest pain, shortness of breath, abdominal pain, nausea vomiting, fevers chills, headache, or any other symptoms.    History provided by:  Patient      Review of Systems    Past Medical History:   Diagnosis Date    Arm fracture     Hyperlipidemia     Hypertension     Osteoporosis        No Known Allergies    Past Surgical History:   Procedure Laterality Date    CARDIAC CATHETERIZATION N/A 10/19/2022    Procedure: Left Heart Cath;  Surgeon: Piotr Edwards DO;  Location: [facility] CATH INVASIVE LOCATION;  Service: Cardiology;  Laterality: N/A;    CARPAL TUNNEL RELEASE Bilateral     CATARACT EXTRACTION      CHOLECYSTECTOMY      ESOPHAGOSCOPY W/ DILATION      HYSTERECTOMY         Family History   Problem Relation Age of Onset    Hypertension Mother     Heart disease Father     Hypertension Father     Hypertension Sister     Heart disease Brother     Hypertension Brother     Diabetes Brother     Breast cancer Paternal Cousin     Breast cancer Maternal Cousin        Social History     Socioeconomic History    Marital status:    Tobacco Use    Smoking status: Former     Current packs/day: 0.00     Types: Electronic Cigarette, Cigarettes     Quit date: 2014     Years since quitting: 10.1    Smokeless tobacco: Never   Vaping Use    Vaping status: Never Used   Substance and Sexual Activity    Alcohol  use: No    Drug use: No    Sexual activity: Defer           Objective   Physical Exam  Constitutional:       General: She is not in acute distress.     Appearance: She is not ill-appearing.   HENT:      Head: Normocephalic and atraumatic.   Eyes:      Conjunctiva/sclera: Conjunctivae normal.   Cardiovascular:      Rate and Rhythm: Normal rate and regular rhythm.      Heart sounds: Normal heart sounds.   Pulmonary:      Effort: Pulmonary effort is normal.      Breath sounds: Normal breath sounds.   Abdominal:      General: Abdomen is flat.      Palpations: Abdomen is soft.      Tenderness: There is no abdominal tenderness.   Neurological:      General: No focal deficit present.      Mental Status: She is alert and oriented to person, place, and time. Mental status is at baseline.         Procedures           ED Course  ED Course as of 03/06/24 2316   Wed Mar 06, 2024   1801 ECG 12 Lead Other; dizziness  Normal sinus rhythm with sinus arrhythmia, rate of 72, QRS of 102 QTc 431, there is ST depression seen in the inferior leads, no ST segment elevation other signs of occlusive myocardial infarction.  The ST changes are unchanged from to previous EKGs.  Unremarkable EKG, interpreted by myself. [SM]      ED Course User Index  [SM] Guido Patton MD                                             Medical Decision Making  68-year-old female presents to the ED with fatigue and concern for lowering blood counts.  Vital signs normal on arrival.  EKG showed no acute findings.  Labs were obtained, hemoglobin level 8.8 which is not dropping from previous.  Other labs and chest x-ray showed no significant findings.  EKG with no acute findings.  Patient was well-appearing on reevaluation.  She will follow-up with her primary care provider.  Given strict return precautions for any new bleeding or acute worsening of symptoms.  Discharged in no acute distress instructed follow-up with PCP and likely GI for scope.    Problems  Addressed:  Anemia, unspecified type: complicated acute illness or injury  Fatigue, unspecified type: complicated acute illness or injury  Hypokalemia: complicated acute illness or injury    Amount and/or Complexity of Data Reviewed  Labs: ordered.  Radiology: ordered.  ECG/medicine tests: ordered and independent interpretation performed.    Risk  Prescription drug management.        Final diagnoses:   Fatigue, unspecified type   Hypokalemia   Anemia, unspecified type       ED Disposition  ED Disposition       ED Disposition   Discharge    Condition   Stable    Comment   --               No follow-up provider specified.       Medication List      No changes were made to your prescriptions during this visit.            Cam Melendrez MD  03/06/24 7743

## 2024-03-08 ENCOUNTER — ANESTHESIA EVENT (OUTPATIENT)
Dept: PERIOP | Facility: HOSPITAL | Age: 69
End: 2024-03-08
Payer: MEDICARE

## 2024-03-08 ENCOUNTER — HOSPITAL ENCOUNTER (OUTPATIENT)
Facility: HOSPITAL | Age: 69
Setting detail: HOSPITAL OUTPATIENT SURGERY
Discharge: HOME | End: 2024-03-08
Attending: SURGERY | Admitting: SURGERY
Payer: MEDICARE

## 2024-03-08 ENCOUNTER — ANESTHESIA (OUTPATIENT)
Dept: PERIOP | Facility: HOSPITAL | Age: 69
End: 2024-03-08
Payer: MEDICARE

## 2024-03-08 VITALS
RESPIRATION RATE: 18 BRPM | DIASTOLIC BLOOD PRESSURE: 61 MMHG | WEIGHT: 173 LBS | BODY MASS INDEX: 29.53 KG/M2 | HEART RATE: 59 BPM | SYSTOLIC BLOOD PRESSURE: 117 MMHG | OXYGEN SATURATION: 98 % | HEIGHT: 64 IN | TEMPERATURE: 98 F

## 2024-03-08 DIAGNOSIS — D64.9 ANEMIA, UNSPECIFIED TYPE: ICD-10-CM

## 2024-03-08 PROCEDURE — 25010000002 PROPOFOL 200 MG/20ML EMULSION: Performed by: NURSE ANESTHETIST, CERTIFIED REGISTERED

## 2024-03-08 PROCEDURE — 25810000003 LACTATED RINGERS PER 1000 ML: Performed by: ANESTHESIOLOGY

## 2024-03-08 RX ORDER — FENTANYL CITRATE 50 UG/ML
50 INJECTION, SOLUTION INTRAMUSCULAR; INTRAVENOUS
Status: DISCONTINUED | OUTPATIENT
Start: 2024-03-08 | End: 2024-03-08 | Stop reason: HOSPADM

## 2024-03-08 RX ORDER — SODIUM CHLORIDE 9 MG/ML
40 INJECTION, SOLUTION INTRAVENOUS AS NEEDED
Status: DISCONTINUED | OUTPATIENT
Start: 2024-03-08 | End: 2024-03-08 | Stop reason: HOSPADM

## 2024-03-08 RX ORDER — OXYCODONE HYDROCHLORIDE AND ACETAMINOPHEN 5; 325 MG/1; MG/1
1 TABLET ORAL ONCE AS NEEDED
Status: DISCONTINUED | OUTPATIENT
Start: 2024-03-08 | End: 2024-03-08 | Stop reason: HOSPADM

## 2024-03-08 RX ORDER — SODIUM CHLORIDE 0.9 % (FLUSH) 0.9 %
10 SYRINGE (ML) INJECTION AS NEEDED
Status: DISCONTINUED | OUTPATIENT
Start: 2024-03-08 | End: 2024-03-08 | Stop reason: HOSPADM

## 2024-03-08 RX ORDER — LIDOCAINE HYDROCHLORIDE 20 MG/ML
INJECTION, SOLUTION EPIDURAL; INFILTRATION; INTRACAUDAL; PERINEURAL AS NEEDED
Status: DISCONTINUED | OUTPATIENT
Start: 2024-03-08 | End: 2024-03-08 | Stop reason: SURG

## 2024-03-08 RX ORDER — OMEPRAZOLE 20 MG/1
20 CAPSULE, DELAYED RELEASE ORAL 2 TIMES DAILY
Qty: 60 CAPSULE | Refills: 1 | Status: ON HOLD | OUTPATIENT
Start: 2024-03-08 | End: 2024-03-15

## 2024-03-08 RX ORDER — SODIUM CHLORIDE 0.9 % (FLUSH) 0.9 %
10 SYRINGE (ML) INJECTION EVERY 12 HOURS SCHEDULED
Status: DISCONTINUED | OUTPATIENT
Start: 2024-03-08 | End: 2024-03-08 | Stop reason: HOSPADM

## 2024-03-08 RX ORDER — SODIUM CHLORIDE, SODIUM LACTATE, POTASSIUM CHLORIDE, CALCIUM CHLORIDE 600; 310; 30; 20 MG/100ML; MG/100ML; MG/100ML; MG/100ML
125 INJECTION, SOLUTION INTRAVENOUS ONCE
Status: COMPLETED | OUTPATIENT
Start: 2024-03-08 | End: 2024-03-08

## 2024-03-08 RX ORDER — PROPOFOL 10 MG/ML
INJECTION, EMULSION INTRAVENOUS AS NEEDED
Status: DISCONTINUED | OUTPATIENT
Start: 2024-03-08 | End: 2024-03-08 | Stop reason: SURG

## 2024-03-08 RX ORDER — ONDANSETRON 2 MG/ML
4 INJECTION INTRAMUSCULAR; INTRAVENOUS AS NEEDED
Status: DISCONTINUED | OUTPATIENT
Start: 2024-03-08 | End: 2024-03-08 | Stop reason: HOSPADM

## 2024-03-08 RX ORDER — IPRATROPIUM BROMIDE AND ALBUTEROL SULFATE 2.5; .5 MG/3ML; MG/3ML
3 SOLUTION RESPIRATORY (INHALATION) ONCE AS NEEDED
Status: DISCONTINUED | OUTPATIENT
Start: 2024-03-08 | End: 2024-03-08 | Stop reason: HOSPADM

## 2024-03-08 RX ORDER — SODIUM CHLORIDE, SODIUM LACTATE, POTASSIUM CHLORIDE, CALCIUM CHLORIDE 600; 310; 30; 20 MG/100ML; MG/100ML; MG/100ML; MG/100ML
100 INJECTION, SOLUTION INTRAVENOUS ONCE AS NEEDED
Status: DISCONTINUED | OUTPATIENT
Start: 2024-03-08 | End: 2024-03-08 | Stop reason: HOSPADM

## 2024-03-08 RX ORDER — MIDAZOLAM HYDROCHLORIDE 1 MG/ML
0.5 INJECTION INTRAMUSCULAR; INTRAVENOUS
Status: DISCONTINUED | OUTPATIENT
Start: 2024-03-08 | End: 2024-03-08 | Stop reason: HOSPADM

## 2024-03-08 RX ORDER — ACETAMINOPHEN 650 MG/1
650 SUPPOSITORY RECTAL ONCE
Status: COMPLETED | OUTPATIENT
Start: 2024-03-08 | End: 2024-03-08

## 2024-03-08 RX ADMIN — LIDOCAINE HYDROCHLORIDE 100 MG: 20 INJECTION, SOLUTION EPIDURAL; INFILTRATION; INTRACAUDAL; PERINEURAL at 10:10

## 2024-03-08 RX ADMIN — SODIUM CHLORIDE, POTASSIUM CHLORIDE, SODIUM LACTATE AND CALCIUM CHLORIDE: 600; 310; 30; 20 INJECTION, SOLUTION INTRAVENOUS at 10:06

## 2024-03-08 RX ADMIN — PROPOFOL 120 MG: 10 INJECTION, EMULSION INTRAVENOUS at 10:10

## 2024-03-08 RX ADMIN — ACETAMINOPHEN 650 MG: 650 SUPPOSITORY RECTAL at 10:48

## 2024-03-08 NOTE — ANESTHESIA PREPROCEDURE EVALUATION
Anesthesia Evaluation     Patient summary reviewed and Nursing notes reviewed   no history of anesthetic complications:   NPO Solid Status: > 8 hours  NPO Liquid Status: > 8 hours           Airway   Mallampati: II  TM distance: >3 FB  Neck ROM: full  No difficulty expected  Dental - normal exam     Pulmonary - normal exam    breath sounds clear to auscultation  (+) a smoker Current, Smoked day of surgery, vape, asthma,  Cardiovascular - normal exam    ECG reviewed  PT is on anticoagulation therapy  Rhythm: regular  Rate: normal    (+) hypertension, dysrhythmias Atrial Fib, angina, hyperlipidemia      Neuro/Psych  (+) dizziness/light headedness  GI/Hepatic/Renal/Endo    (+) GERD    Musculoskeletal (-) negative ROS    Abdominal  - normal exam   Substance History - negative use     OB/GYN negative ob/gyn ROS         Other - negative ROS                     Anesthesia Plan    ASA 3     general   total IV anesthesia  intravenous induction     Anesthetic plan, risks, benefits, and alternatives have been provided, discussed and informed consent has been obtained with: patient.    Use of blood products discussed with patient  Consented to blood products.        CODE STATUS:

## 2024-03-08 NOTE — ANESTHESIA POSTPROCEDURE EVALUATION
Patient: Ivis Vela    Procedure Summary       Date: 03/08/24 Room / Location:  [facility] OR  /  [facility]    Anesthesia Start: 1006 Anesthesia Stop: 1016    Procedure: ESOPHAGOGASTRODUODENOSCOPY WITH ANESTHESIA (Esophagus) Diagnosis:       Anemia, unspecified type      (Anemia, unspecified type [D64.9])    Surgeons: Rufino Cohen MD Provider: Roger Eaton MD    Anesthesia Type: general ASA Status: 3            Anesthesia Type: general    Vitals  Vitals Value Taken Time   /61 03/08/24 1040   Temp 98 °F (36.7 °C) 03/08/24 1018   Pulse 59 03/08/24 1044   Resp 18 03/08/24 1018   SpO2 98 % 03/08/24 1044           Post Anesthesia Care and Evaluation    Patient location during evaluation: PACU  Patient participation: complete - patient participated  Level of consciousness: awake  Pain score: 1  Pain management: adequate    Airway patency: patent  Anesthetic complications: No anesthetic complications  PONV Status: controlled  Cardiovascular status: acceptable and blood pressure returned to baseline  Respiratory status: acceptable and room air  Hydration status: acceptable    Comments: Patient comfortable with discharge at this time.

## 2024-03-11 ENCOUNTER — TELEPHONE (OUTPATIENT)
Dept: CARDIOLOGY | Facility: CLINIC | Age: 69
End: 2024-03-11
Payer: MEDICARE

## 2024-03-11 ENCOUNTER — TELEPHONE (OUTPATIENT)
Dept: SURGERY | Facility: CLINIC | Age: 69
End: 2024-03-11
Payer: MEDICARE

## 2024-03-11 DIAGNOSIS — D64.9 ANEMIA, UNSPECIFIED TYPE: Primary | ICD-10-CM

## 2024-03-11 DIAGNOSIS — D64.9 SYMPTOMATIC ANEMIA: ICD-10-CM

## 2024-03-11 DIAGNOSIS — I48.0 PAROXYSMAL ATRIAL FIBRILLATION: Primary | ICD-10-CM

## 2024-03-11 LAB — REF LAB TEST METHOD: NORMAL

## 2024-03-11 RX ORDER — SODIUM CHLORIDE 0.9 % (FLUSH) 0.9 %
10 SYRINGE (ML) INJECTION AS NEEDED
Status: CANCELLED | OUTPATIENT
Start: 2024-03-11

## 2024-03-11 RX ORDER — POLYETHYLENE GLYCOL 3350 17 G/17G
238 POWDER, FOR SOLUTION ORAL ONCE
Qty: 238 EACH | Refills: 0 | Status: SHIPPED | OUTPATIENT
Start: 2024-03-11 | End: 2024-03-11

## 2024-03-11 RX ORDER — SODIUM CHLORIDE 0.9 % (FLUSH) 0.9 %
10 SYRINGE (ML) INJECTION EVERY 12 HOURS SCHEDULED
Status: CANCELLED | OUTPATIENT
Start: 2024-03-11

## 2024-03-11 RX ORDER — BISACODYL 5 MG/1
5 TABLET, DELAYED RELEASE ORAL TAKE AS DIRECTED
Qty: 8 TABLET | Refills: 0 | Status: SHIPPED | OUTPATIENT
Start: 2024-03-11 | End: 2024-03-13

## 2024-03-11 RX ORDER — SODIUM CHLORIDE 9 MG/ML
40 INJECTION, SOLUTION INTRAVENOUS AS NEEDED
Status: CANCELLED | OUTPATIENT
Start: 2024-03-11

## 2024-03-11 NOTE — TELEPHONE ENCOUNTER
Called patient to advise that Dr. Rashid would need to determine when would be ok to resume Eliquis. She states is scheduled for colonoscopy near future. She will await his instruction. Last iron was 8.5 patient states and she wonders if she should have this recheck again and she is not feeling much better?

## 2024-03-11 NOTE — TELEPHONE ENCOUNTER
Caller: Ivis Vela    Relationship: Self    Best call back number: [phone]    What is the best time to reach you: ANY    What was the call regarding: PATIENT ASKING IF SHE NEEDS TO HAVE LABS DRAWN AGAIN BEFORE THE NEXT APPOINTMENT. DOES SHE STAY OFF THE ELIQUIS STILL? PLEASE CALL TO ADVISE.     Is it okay if the provider responds through MyChart: NO

## 2024-03-11 NOTE — TELEPHONE ENCOUNTER
SPOKE TO MS MAHAN WITH THE FOLLOWING SCHEDULE:    2 DAY BOWEL PREP MEDICATIONS HAVE BEEN SENT TO HER PHARMACY ON FILE.    2 DAY BOWEL PREP INSTRUCTIONS AWAITING  AT [facility] BY DAUGHTER ARTIE.    2 DAY BOWEL PREP:  DAY 1:  WED 03/13/24  DAY 2: THUR 03/14/24    NOTHING TO EAT OR DRINK AFTER MIDNIGHT ON THURSDAY 03/14/24.    COLONOSCOPY FRIDAY 03/15/24 ARRIVE AT 7:30 AM AT [facility], OUTPATIENT SURGERY DEPT.  PLEASE HAVE AN ADULT  WITH YOU TO DRIVE YOU HOME AFTER THE PROCEDURE.    PATIENT STATES SHE HAS BEEN OFF HER ELIQUIS BLOOD THINNING MEDICATIONS SINCE 03/06/24.    DAUGHTER TO  BOWEL PREP INSTRUCTIONS    PATIENT VERBALIZED UNDERSTANDING AND AGREEMENT WITH THE ABOVE SCHEDULE, DATE, TIME, LOCATION, PREP, PROCESS.    IF ANY QUESTIONS, INSTRUCTED TO CALL OUR OFFICE [phone].

## 2024-03-11 NOTE — TELEPHONE ENCOUNTER
I ordered a CBC to compare hemoglobin levels. We need to find out what Dr. Cohen's recommendations are regarding when the patient could resume anticoagulation. Can you also please contact EP (patient is established with them) to schedule appointment for consultation regarding Watchman's device? Thanks.

## 2024-03-14 ENCOUNTER — ANESTHESIA EVENT (OUTPATIENT)
Dept: PERIOP | Facility: HOSPITAL | Age: 69
End: 2024-03-14
Payer: MEDICARE

## 2024-03-14 DIAGNOSIS — D64.9 ANEMIA, UNSPECIFIED TYPE: Primary | ICD-10-CM

## 2024-03-14 NOTE — TELEPHONE ENCOUNTER
As EGD was unrevealing, colonoscopy is planned for Friday. Patient did not want both done simultaneously. If colonoscopy is also negative, it would be reasonable to restart, give iron supplementation. If blood counts continue to decrease, the next step will be GI referral for pill endoscopy [Care Plan reviewed and provided to patient/caregiver] : Care plan reviewed and provided to patient/caregiver [Understands and communicates without difficulty] : Patient/Caregiver understands and communicates without difficulty

## 2024-03-15 ENCOUNTER — TELEPHONE (OUTPATIENT)
Dept: CARDIOLOGY | Facility: CLINIC | Age: 69
End: 2024-03-15
Payer: MEDICARE

## 2024-03-15 ENCOUNTER — HOSPITAL ENCOUNTER (OUTPATIENT)
Facility: HOSPITAL | Age: 69
Setting detail: HOSPITAL OUTPATIENT SURGERY
Discharge: HOME | End: 2024-03-15
Attending: SURGERY | Admitting: SURGERY
Payer: MEDICARE

## 2024-03-15 ENCOUNTER — ANESTHESIA (OUTPATIENT)
Dept: PERIOP | Facility: HOSPITAL | Age: 69
End: 2024-03-15
Payer: MEDICARE

## 2024-03-15 VITALS
DIASTOLIC BLOOD PRESSURE: 76 MMHG | HEART RATE: 79 BPM | RESPIRATION RATE: 16 BRPM | HEIGHT: 65 IN | WEIGHT: 173 LBS | OXYGEN SATURATION: 98 % | TEMPERATURE: 97.8 F | BODY MASS INDEX: 28.82 KG/M2 | SYSTOLIC BLOOD PRESSURE: 163 MMHG

## 2024-03-15 DIAGNOSIS — D64.9 ANEMIA, UNSPECIFIED TYPE: ICD-10-CM

## 2024-03-15 PROCEDURE — 25010000002 PROPOFOL 10 MG/ML EMULSION: Performed by: NURSE ANESTHETIST, CERTIFIED REGISTERED

## 2024-03-15 PROCEDURE — 25010000002 ONDANSETRON PER 1 MG: Performed by: NURSE ANESTHETIST, CERTIFIED REGISTERED

## 2024-03-15 PROCEDURE — 25810000003 LACTATED RINGERS PER 1000 ML: Performed by: ANESTHESIOLOGY

## 2024-03-15 PROCEDURE — 25010000002 PROPOFOL 200 MG/20ML EMULSION: Performed by: NURSE ANESTHETIST, CERTIFIED REGISTERED

## 2024-03-15 RX ORDER — IPRATROPIUM BROMIDE AND ALBUTEROL SULFATE 2.5; .5 MG/3ML; MG/3ML
3 SOLUTION RESPIRATORY (INHALATION) ONCE AS NEEDED
Status: DISCONTINUED | OUTPATIENT
Start: 2024-03-15 | End: 2024-03-15 | Stop reason: HOSPADM

## 2024-03-15 RX ORDER — ONDANSETRON 2 MG/ML
4 INJECTION INTRAMUSCULAR; INTRAVENOUS AS NEEDED
Status: DISCONTINUED | OUTPATIENT
Start: 2024-03-15 | End: 2024-03-15 | Stop reason: HOSPADM

## 2024-03-15 RX ORDER — SODIUM CHLORIDE 0.9 % (FLUSH) 0.9 %
10 SYRINGE (ML) INJECTION EVERY 12 HOURS SCHEDULED
Status: DISCONTINUED | OUTPATIENT
Start: 2024-03-15 | End: 2024-03-15 | Stop reason: HOSPADM

## 2024-03-15 RX ORDER — SODIUM CHLORIDE, SODIUM LACTATE, POTASSIUM CHLORIDE, CALCIUM CHLORIDE 600; 310; 30; 20 MG/100ML; MG/100ML; MG/100ML; MG/100ML
125 INJECTION, SOLUTION INTRAVENOUS ONCE
Status: COMPLETED | OUTPATIENT
Start: 2024-03-15 | End: 2024-03-15

## 2024-03-15 RX ORDER — SODIUM CHLORIDE 9 MG/ML
40 INJECTION, SOLUTION INTRAVENOUS AS NEEDED
Status: DISCONTINUED | OUTPATIENT
Start: 2024-03-15 | End: 2024-03-15 | Stop reason: HOSPADM

## 2024-03-15 RX ORDER — PROPOFOL 10 MG/ML
INJECTION, EMULSION INTRAVENOUS AS NEEDED
Status: DISCONTINUED | OUTPATIENT
Start: 2024-03-15 | End: 2024-03-15 | Stop reason: SURG

## 2024-03-15 RX ORDER — SODIUM CHLORIDE 0.9 % (FLUSH) 0.9 %
10 SYRINGE (ML) INJECTION AS NEEDED
Status: DISCONTINUED | OUTPATIENT
Start: 2024-03-15 | End: 2024-03-15 | Stop reason: HOSPADM

## 2024-03-15 RX ORDER — SODIUM CHLORIDE, SODIUM LACTATE, POTASSIUM CHLORIDE, CALCIUM CHLORIDE 600; 310; 30; 20 MG/100ML; MG/100ML; MG/100ML; MG/100ML
100 INJECTION, SOLUTION INTRAVENOUS ONCE AS NEEDED
Status: DISCONTINUED | OUTPATIENT
Start: 2024-03-15 | End: 2024-03-15 | Stop reason: HOSPADM

## 2024-03-15 RX ORDER — PANTOPRAZOLE SODIUM 40 MG/1
40 TABLET, DELAYED RELEASE ORAL DAILY
COMMUNITY

## 2024-03-15 RX ORDER — OXYCODONE HYDROCHLORIDE AND ACETAMINOPHEN 5; 325 MG/1; MG/1
1 TABLET ORAL ONCE AS NEEDED
Status: DISCONTINUED | OUTPATIENT
Start: 2024-03-15 | End: 2024-03-15 | Stop reason: HOSPADM

## 2024-03-15 RX ORDER — MIDAZOLAM HYDROCHLORIDE 1 MG/ML
0.5 INJECTION INTRAMUSCULAR; INTRAVENOUS
Status: DISCONTINUED | OUTPATIENT
Start: 2024-03-15 | End: 2024-03-15 | Stop reason: HOSPADM

## 2024-03-15 RX ADMIN — PROPOFOL 160 MCG/KG/MIN: 10 INJECTION, EMULSION INTRAVENOUS at 08:03

## 2024-03-15 RX ADMIN — ONDANSETRON 4 MG: 2 INJECTION INTRAMUSCULAR; INTRAVENOUS at 08:41

## 2024-03-15 RX ADMIN — PROPOFOL 100 MG: 10 INJECTION, EMULSION INTRAVENOUS at 08:03

## 2024-03-15 RX ADMIN — PROPOFOL 20 MG: 10 INJECTION, EMULSION INTRAVENOUS at 08:12

## 2024-03-15 RX ADMIN — SODIUM CHLORIDE, POTASSIUM CHLORIDE, SODIUM LACTATE AND CALCIUM CHLORIDE: 600; 310; 30; 20 INJECTION, SOLUTION INTRAVENOUS at 08:00

## 2024-03-15 NOTE — TELEPHONE ENCOUNTER
Patient called to state had colonoscopy today which was negative. She states is feeling about same, no better no worse. States no evidence of bleeding from colonoscopy standpoint and was told could resume her normal meds. Does she restart her Eliquis and ASA?

## 2024-03-15 NOTE — ANESTHESIA PREPROCEDURE EVALUATION
Anesthesia Evaluation     Patient summary reviewed and Nursing notes reviewed   no history of anesthetic complications:   NPO Solid Status: > 8 hours  NPO Liquid Status: > 8 hours           Airway   Mallampati: II  TM distance: >3 FB  Neck ROM: full  No difficulty expected  Dental - normal exam     Pulmonary - normal exam    breath sounds clear to auscultation  (+) a smoker Current, Smoked day of surgery, vape, asthma,  Cardiovascular - normal exam    ECG reviewed  PT is on anticoagulation therapy  Rhythm: regular  Rate: normal    (+) hypertension, valvular problems/murmurs (mild TR), dysrhythmias Atrial Fib, angina, hyperlipidemia      Neuro/Psych  (+) dizziness/light headedness  GI/Hepatic/Renal/Endo    (+) GERD    Musculoskeletal (-) negative ROS    Abdominal  - normal exam   Substance History - negative use     OB/GYN negative ob/gyn ROS         Other   blood dyscrasia anemia,     ROS/Med Hx Other: Echo 10/19/2022:  ·  Left ventricular ejection fraction appears to be 51 - 55%.  ·  Left ventricular wall thickness is consistent with mild to moderate septal asymmetric hypertrophy.  ·  Left ventricular diastolic function is consistent with (grade I) impaired relaxation.  ·  Estimated right ventricular systolic pressure from tricuspid regurgitation is normal (<35 mmHg).                  Anesthesia Plan    ASA 3     general   total IV anesthesia  intravenous induction     Anesthetic plan, risks, benefits, and alternatives have been provided, discussed and informed consent has been obtained with: patient.  Pre-procedure education provided  Plan discussed with CRNA.      CODE STATUS:

## 2024-03-15 NOTE — TELEPHONE ENCOUNTER
Dr. Alonso would like you to schedule the patient for a consult to discuss a Watchman device.    Thanks

## 2024-03-15 NOTE — ANESTHESIA POSTPROCEDURE EVALUATION
Patient: Ivis Vela    Procedure Summary       Date: 03/15/24 Room / Location: [facility] OR [address] [place] [address] [facility] OR    Anesthesia Start: 0800 Anesthesia Stop: 0830    Procedure: COLONOSCOPY Diagnosis:       Anemia, unspecified type      (Anemia, unspecified type [D64.9])    Surgeons: Rufino Cohen MD Provider: Andrei Valdez DO    Anesthesia Type: general ASA Status: 3            Anesthesia Type: general    Vitals  Vitals Value Taken Time   /76 03/15/24 0900   Temp 97.8 °F (36.6 °C) 03/15/24 0832   Pulse 79 03/15/24 0900   Resp 16 03/15/24 0900   SpO2 98 % 03/15/24 0900           Post Anesthesia Care and Evaluation    Patient location during evaluation: PHASE II  Patient participation: complete - patient participated  Level of consciousness: awake and alert  Pain score: 1  Pain management: adequate    Airway patency: patent  Anesthetic complications: No anesthetic complications  PONV Status: controlled  Cardiovascular status: acceptable  Respiratory status: acceptable  Hydration status: acceptable

## 2024-03-18 DIAGNOSIS — D64.9 SYMPTOMATIC ANEMIA: Primary | ICD-10-CM

## 2024-03-18 LAB — REF LAB TEST METHOD: NORMAL

## 2024-03-18 NOTE — TELEPHONE ENCOUNTER
Dr. Cohen said it would be reasonable to resume anticoagulation if her colonoscopy was negative. If her blood counts begin to drop again after resuming, she will need to be referred to GI for possible pill cam. She also has an appointment with EP to discuss Watchman device. I would like her to have a CBC Friday. Thanks.

## 2024-03-18 NOTE — TELEPHONE ENCOUNTER
Called pt and advised her to discuss starting back on the Eliquis with Dr. Cohen. She expressed understanding and will get her lab done on Friday.

## 2024-03-21 ENCOUNTER — LAB (OUTPATIENT)
Dept: LAB | Facility: HOSPITAL | Age: 69
End: 2024-03-21
Payer: MEDICARE

## 2024-03-21 ENCOUNTER — OFFICE VISIT (OUTPATIENT)
Dept: SURGERY | Facility: CLINIC | Age: 69
End: 2024-03-21
Payer: MEDICARE

## 2024-03-21 VITALS
WEIGHT: 168.8 LBS | DIASTOLIC BLOOD PRESSURE: 78 MMHG | HEART RATE: 84 BPM | BODY MASS INDEX: 27.13 KG/M2 | SYSTOLIC BLOOD PRESSURE: 124 MMHG | HEIGHT: 66 IN

## 2024-03-21 DIAGNOSIS — I25.10 ASCVD (ARTERIOSCLEROTIC CARDIOVASCULAR DISEASE): ICD-10-CM

## 2024-03-21 DIAGNOSIS — D64.9 ANEMIA, UNSPECIFIED TYPE: Primary | ICD-10-CM

## 2024-03-21 DIAGNOSIS — I10 ESSENTIAL HYPERTENSION: ICD-10-CM

## 2024-03-21 DIAGNOSIS — I48.0 PAROXYSMAL ATRIAL FIBRILLATION: ICD-10-CM

## 2024-03-21 DIAGNOSIS — D64.9 SYMPTOMATIC ANEMIA: ICD-10-CM

## 2024-03-21 DIAGNOSIS — I49.1 PREMATURE ATRIAL CONTRACTIONS: ICD-10-CM

## 2024-03-21 LAB
ANION GAP SERPL CALCULATED.3IONS-SCNC: 10 MMOL/L (ref 5–15)
ANISOCYTOSIS BLD QL: NORMAL
BASOPHILS # BLD AUTO: 0.04 10*3/MM3 (ref 0–0.2)
BASOPHILS NFR BLD AUTO: 0.5 % (ref 0–1.5)
BUN SERPL-MCNC: 10 MG/DL (ref 8–23)
BUN/CREAT SERPL: 12.5 (ref 7–25)
CALCIUM SPEC-SCNC: 9.3 MG/DL (ref 8.6–10.5)
CHLORIDE SERPL-SCNC: 108 MMOL/L (ref 98–107)
CO2 SERPL-SCNC: 28 MMOL/L (ref 22–29)
CREAT SERPL-MCNC: 0.8 MG/DL (ref 0.57–1)
DACRYOCYTES BLD QL SMEAR: NORMAL
DEPRECATED RDW RBC AUTO: 49.3 FL (ref 37–54)
EGFRCR SERPLBLD CKD-EPI 2021: 80.9 ML/MIN/1.73
EOSINOPHIL # BLD AUTO: 0.05 10*3/MM3 (ref 0–0.4)
EOSINOPHIL NFR BLD AUTO: 0.7 % (ref 0.3–6.2)
ERYTHROCYTE [DISTWIDTH] IN BLOOD BY AUTOMATED COUNT: 17.1 % (ref 12.3–15.4)
GLUCOSE SERPL-MCNC: 95 MG/DL (ref 65–99)
HCT VFR BLD AUTO: 33.7 % (ref 34–46.6)
HGB BLD-MCNC: 9.5 G/DL (ref 12–15.9)
HYPOCHROMIA BLD QL: NORMAL
IMM GRANULOCYTES # BLD AUTO: 0.02 10*3/MM3 (ref 0–0.05)
IMM GRANULOCYTES NFR BLD AUTO: 0.3 % (ref 0–0.5)
LYMPHOCYTES # BLD AUTO: 1.97 10*3/MM3 (ref 0.7–3.1)
LYMPHOCYTES NFR BLD AUTO: 26 % (ref 19.6–45.3)
MAGNESIUM SERPL-MCNC: 2.1 MG/DL (ref 1.6–2.4)
MCH RBC QN AUTO: 23.1 PG (ref 26.6–33)
MCHC RBC AUTO-ENTMCNC: 28.2 G/DL (ref 31.5–35.7)
MCV RBC AUTO: 81.8 FL (ref 79–97)
MICROCYTES BLD QL: NORMAL
MONOCYTES # BLD AUTO: 0.71 10*3/MM3 (ref 0.1–0.9)
MONOCYTES NFR BLD AUTO: 9.4 % (ref 5–12)
NEUTROPHILS NFR BLD AUTO: 4.8 10*3/MM3 (ref 1.7–7)
NEUTROPHILS NFR BLD AUTO: 63.1 % (ref 42.7–76)
NRBC BLD AUTO-RTO: 0 /100 WBC (ref 0–0.2)
PLAT MORPH BLD: NORMAL
PLATELET # BLD AUTO: 451 10*3/MM3 (ref 140–450)
PMV BLD AUTO: 9.6 FL (ref 6–12)
POTASSIUM SERPL-SCNC: 3.4 MMOL/L (ref 3.5–5.2)
RBC # BLD AUTO: 4.12 10*6/MM3 (ref 3.77–5.28)
SODIUM SERPL-SCNC: 146 MMOL/L (ref 136–145)
TSH SERPL DL<=0.05 MIU/L-ACNC: 1.39 UIU/ML (ref 0.27–4.2)
WBC NRBC COR # BLD AUTO: 7.59 10*3/MM3 (ref 3.4–10.8)

## 2024-03-21 PROCEDURE — 85025 COMPLETE CBC W/AUTO DIFF WBC: CPT

## 2024-03-21 PROCEDURE — 3078F DIAST BP <80 MM HG: CPT | Performed by: SURGERY

## 2024-03-21 PROCEDURE — 84443 ASSAY THYROID STIM HORMONE: CPT

## 2024-03-21 PROCEDURE — 3074F SYST BP LT 130 MM HG: CPT | Performed by: SURGERY

## 2024-03-21 PROCEDURE — 1159F MED LIST DOCD IN RCRD: CPT | Performed by: SURGERY

## 2024-03-21 PROCEDURE — 80048 BASIC METABOLIC PNL TOTAL CA: CPT

## 2024-03-21 PROCEDURE — 85007 BL SMEAR W/DIFF WBC COUNT: CPT

## 2024-03-21 PROCEDURE — 36415 COLL VENOUS BLD VENIPUNCTURE: CPT

## 2024-03-21 PROCEDURE — 1160F RVW MEDS BY RX/DR IN RCRD: CPT | Performed by: SURGERY

## 2024-03-21 PROCEDURE — 99212 OFFICE O/P EST SF 10 MIN: CPT | Performed by: SURGERY

## 2024-03-21 PROCEDURE — 83735 ASSAY OF MAGNESIUM: CPT

## 2024-03-22 ENCOUNTER — OFFICE VISIT (OUTPATIENT)
Dept: CARDIOLOGY | Facility: CLINIC | Age: 69
End: 2024-03-22
Payer: MEDICARE

## 2024-03-22 VITALS
BODY MASS INDEX: 27.38 KG/M2 | HEART RATE: 65 BPM | DIASTOLIC BLOOD PRESSURE: 70 MMHG | WEIGHT: 170.4 LBS | OXYGEN SATURATION: 95 % | SYSTOLIC BLOOD PRESSURE: 134 MMHG | HEIGHT: 66 IN

## 2024-03-22 DIAGNOSIS — I48.0 PAROXYSMAL ATRIAL FIBRILLATION: Primary | ICD-10-CM

## 2024-03-22 DIAGNOSIS — D64.9 SYMPTOMATIC ANEMIA: ICD-10-CM

## 2024-03-22 PROCEDURE — 3078F DIAST BP <80 MM HG: CPT | Performed by: NURSE PRACTITIONER

## 2024-03-22 PROCEDURE — 3075F SYST BP GE 130 - 139MM HG: CPT | Performed by: NURSE PRACTITIONER

## 2024-03-22 PROCEDURE — 1159F MED LIST DOCD IN RCRD: CPT | Performed by: NURSE PRACTITIONER

## 2024-03-22 PROCEDURE — 1160F RVW MEDS BY RX/DR IN RCRD: CPT | Performed by: NURSE PRACTITIONER

## 2024-03-22 PROCEDURE — 99214 OFFICE O/P EST MOD 30 MIN: CPT | Performed by: NURSE PRACTITIONER

## 2024-03-22 RX ORDER — FERROUS SULFATE 325(65) MG
1 TABLET ORAL DAILY
COMMUNITY
Start: 2024-03-06

## 2024-03-22 RX ORDER — LOSARTAN POTASSIUM AND HYDROCHLOROTHIAZIDE 12.5; 5 MG/1; MG/1
1 TABLET ORAL DAILY
COMMUNITY

## 2024-03-22 NOTE — LETTER
March 22, 2024     Lawrence Faulkner DO  [address] [place] [address] Drive  [address]  [place] [id number]    Patient: Ivis Vela   YOB: 1955   Date of Visit: 3/22/2024     Dear Lawrence Faulkner DO:       Thank you for referring Ivis Vela to me for evaluation. Below are the relevant portions of my assessment and plan of care.    If you have questions, please do not hesitate to call me. I look forward to following Ivis along with you.         Sincerely,        GAYLE Haskins        CC: No Recipients    Baylee Chaudhary APRN  03/22/24 1104  Sign when Signing Visit  Lawrence Faulkner DO  Ivis Vela  1955 03/22/2024    Patient Active Problem List   Diagnosis   • Hypertension   • Asthma   • Osteoporosis   • Left arm pain   • Closed nondisplaced fracture of head of radius with routine healing   • Palpitations   • Dizziness and giddiness   • Unstable angina   • Paroxysmal atrial fibrillation   • PVC's (premature ventricular contractions)   • Premature atrial contractions   • VT (ventricular tachycardia)   • Anemia       Dear Lawrence Faulkner DO:    Subjective    Chief Complaint   Patient presents with   • Follow-up     ROUTINE           History of Present Illness:    Ivis Vela is a 68 y.o. female with a past medical history of paroxysmal atrial fibrillation on Eliquis, nonobstructive CAD, PVCs, symptomatic PACs with an 8% burden on recent Holter, and recent symptomatic anemia with hemoglobin as low as 8.5. She presents today for cardiology follow-up. She recently underwent EGD and colonoscopy. There were no significant findings. Iron supplementation was initiated. General surgery did agree with resuming Eliquis and monitoring CBC with the recommendation of GI referral with pill cam if hemoglobin declines again. Yesterday, hemoglobin was 9.5 which had improved from 8.8 previously. She has an appointment with EP on 4/12/24 for consultation regarding possible Watchman device. She denies any overt  bleeding. CHADSVASc score is 3. She reports she is feeling much better. Her palpitations have resolved. Her energy has improved. BP is now improved.         No Known Allergies:      Current Outpatient Medications:   •  aspirin 81 MG EC tablet, Take 1 tablet by mouth Daily., Disp: , Rfl:   •  atorvastatin (LIPITOR) 80 MG tablet, TAKE 1 TABLET DAILY, Disp: 90 tablet, Rfl: 3  •  FeroSul 325 (65 Fe) MG tablet, Take 1 tablet by mouth Daily., Disp: , Rfl:   •  flecainide (TAMBOCOR) 100 MG tablet, Take 1 tablet by mouth 2 (Two) Times a Day., Disp: 60 tablet, Rfl: 5  •  isosorbide mononitrate (IMDUR) 60 MG 24 hr tablet, Take 1 tablet by mouth Daily., Disp: 30 tablet, Rfl: 5  •  nitroglycerin (NITROSTAT) 0.4 MG SL tablet, 1 under the tongue as needed for angina, may repeat q5mins for up three doses, Disp: 25 tablet, Rfl: 2  •  pantoprazole (PROTONIX) 40 MG EC tablet, Take 1 tablet by mouth Daily., Disp: , Rfl:   •  apixaban (ELIQUIS) 5 MG tablet tablet, Take 1 tablet by mouth 2 (Two) Times a Day. (Patient not taking: Reported on 3/22/2024), Disp: 60 tablet, Rfl: 5  •  losartan-hydrochlorothiazide (HYZAAR) 50-12.5 MG per tablet, Take 1 tablet by mouth Daily., Disp: , Rfl:       The following portions of the patient's history were reviewed and updated as appropriate: allergies, current medications, past family history, past medical history, past social history, past surgical history and problem list.    Social History     Tobacco Use   • Smoking status: Former     Current packs/day: 0.00     Types: Electronic Cigarette, Cigarettes     Quit date: 2014     Years since quitting: 10.2     Passive exposure: Past   • Smokeless tobacco: Never   Vaping Use   • Vaping status: Every Day   • Substances: Nicotine   Substance Use Topics   • Alcohol use: No   • Drug use: No       Review of Systems   Constitutional: Negative for decreased appetite and malaise/fatigue.   Cardiovascular:  Negative for chest pain, dyspnea on exertion and  "palpitations.   Respiratory:  Negative for cough and shortness of breath.        Objective  Vitals:    03/22/24 0958   BP: 134/70   Pulse: 65   SpO2: 95%   Weight: 77.3 kg (170 lb 6.4 oz)   Height: 167.6 cm (66\")     Body mass index is 27.5 kg/m².        Vitals reviewed.   Constitutional:       Appearance: Healthy appearance. Well-developed and not in distress.   HENT:      Head: Normocephalic and atraumatic.   Neck:      Vascular: No carotid bruit or JVD.   Pulmonary:      Effort: Pulmonary effort is normal.      Breath sounds: Normal breath sounds. No wheezing. No rales.   Cardiovascular:      Normal rate. Regular rhythm.      Murmurs: There is no murmur.      . No S3 and S4 gallop.   Edema:     Peripheral edema absent.   Abdominal:      General: Bowel sounds are normal.      Palpations: Abdomen is soft.   Skin:     General: Skin is warm and dry.   Neurological:      Mental Status: Alert, oriented to person, place, and time and oriented to person, place and time.   Psychiatric:         Mood and Affect: Mood normal.         Behavior: Behavior normal.         Lab Results   Component Value Date     (H) 03/21/2024    K 3.4 (L) 03/21/2024     (H) 03/21/2024    CO2 28.0 03/21/2024    BUN 10 03/21/2024    CREATININE 0.80 03/21/2024    GLUCOSE 95 03/21/2024    CALCIUM 9.3 03/21/2024    AST 14 03/06/2024    ALT 15 03/06/2024    ALKPHOS 78 03/06/2024     Lab Results   Component Value Date    WBC 7.59 03/21/2024    HGB 9.5 (L) 03/21/2024    HCT 33.7 (L) 03/21/2024     (H) 03/21/2024     Lab Results   Component Value Date    TSH 1.390 03/21/2024    TRIG 96 10/18/2022    HDL 55 10/18/2022    LDL 85 10/18/2022          Results for orders placed during the hospital encounter of 10/18/22    Adult Transthoracic Echo Complete w/ Color, Spectral and Contrast if necessary per protocol    Interpretation Summary  •  Left ventricular ejection fraction appears to be 51 - 55%.  •  Left ventricular wall thickness is " consistent with mild to moderate septal asymmetric hypertrophy.  •  Left ventricular diastolic function is consistent with (grade I) impaired relaxation.  •  Estimated right ventricular systolic pressure from tricuspid regurgitation is normal (<35 mmHg).     Results for orders placed during the hospital encounter of 08/26/22    Stress Test With Myocardial Perfusion One Day    Interpretation Summary  · A pharmacological stress test was performed using regadenoson without low-level exercise.  · Findings consistent with a normal ECG stress test.  · Myocardial perfusion imaging indicates a normal myocardial perfusion study with no evidence of ischemia.  · Normal LV cavity size. Normal LV wall motion noted.  · Left ventricular ejection fraction is hyperdynamic (Calculated EF > 70%). .  · Impressions are consistent with a low risk study.       Results for orders placed during the hospital encounter of 10/18/22    Cardiac Catheterization/Vascular Study    Conclusion  •  Moderate CAD noted worse seen in the     Aggressive recertification        Procedures      Assessment & Plan   Diagnosis Plan   1. Paroxysmal atrial fibrillation  CBC & Differential      2. Symptomatic anemia  CBC & Differential                   Recommendations:    Paroxysmal atrial fibrillation - CHADSVASc score is 3. Will resume Eliquis since hemoglobin is improving. Will check CBC in 4 days. If hemoglobin declines, will have to consider holding anticoagulation. I advised her to keep her appointment with EP on 4/12/24 to discuss possible Watchman Device. Continue flecainide. She cannot tolerate metoprolol due to bradycardia.  Symptomatic anemia - improving. Continue iron supplementation. If hemoglobin begins to decline, will need to refer to GI for pillcam.    Plan of care discussed with Dr. Arceo    Return in about 4 weeks (around 4/19/2024) for Recheck.    As always, I appreciate very much the opportunity to participate in the cardiovascular care of  your patients.      With Best Regards,    GAYLE Haskins

## 2024-03-22 NOTE — PROGRESS NOTES
Lawrence Faulkner DO  Ivis Vela  1955 03/22/2024    Patient Active Problem List   Diagnosis    Hypertension    Asthma    Osteoporosis    Left arm pain    Closed nondisplaced fracture of head of radius with routine healing    Palpitations    Dizziness and giddiness    Unstable angina    Paroxysmal atrial fibrillation    PVC's (premature ventricular contractions)    Premature atrial contractions    VT (ventricular tachycardia)    Anemia       Dear Lawrence Faulkner DO:    Subjective     Chief Complaint   Patient presents with    Follow-up     ROUTINE           History of Present Illness:    Ivis Vela is a 68 y.o. female with a past medical history of paroxysmal atrial fibrillation on Eliquis, nonobstructive CAD, PVCs, symptomatic PACs with an 8% burden on recent Holter, and recent symptomatic anemia with hemoglobin as low as 8.5. She presents today for cardiology follow-up. She recently underwent EGD and colonoscopy. There were no significant findings. Iron supplementation was initiated. General surgery did agree with resuming Eliquis and monitoring CBC with the recommendation of GI referral with pill cam if hemoglobin declines again. Yesterday, hemoglobin was 9.5 which had improved from 8.8 previously. She has an appointment with EP on 4/12/24 for consultation regarding possible Watchman device. She denies any overt bleeding. CHADSVASc score is 3. She reports she is feeling much better. Her palpitations have resolved. Her energy has improved. BP is now improved.         No Known Allergies:      Current Outpatient Medications:     aspirin 81 MG EC tablet, Take 1 tablet by mouth Daily., Disp: , Rfl:     atorvastatin (LIPITOR) 80 MG tablet, TAKE 1 TABLET DAILY, Disp: 90 tablet, Rfl: 3    FeroSul 325 (65 Fe) MG tablet, Take 1 tablet by mouth Daily., Disp: , Rfl:     flecainide (TAMBOCOR) 100 MG tablet, Take 1 tablet by mouth 2 (Two) Times a Day., Disp: 60 tablet, Rfl: 5    isosorbide mononitrate (IMDUR) 60  "MG 24 hr tablet, Take 1 tablet by mouth Daily., Disp: 30 tablet, Rfl: 5    nitroglycerin (NITROSTAT) 0.4 MG SL tablet, 1 under the tongue as needed for angina, may repeat q5mins for up three doses, Disp: 25 tablet, Rfl: 2    pantoprazole (PROTONIX) 40 MG EC tablet, Take 1 tablet by mouth Daily., Disp: , Rfl:     apixaban (ELIQUIS) 5 MG tablet tablet, Take 1 tablet by mouth 2 (Two) Times a Day. (Patient not taking: Reported on 3/22/2024), Disp: 60 tablet, Rfl: 5    losartan-hydrochlorothiazide (HYZAAR) 50-12.5 MG per tablet, Take 1 tablet by mouth Daily., Disp: , Rfl:       The following portions of the patient's history were reviewed and updated as appropriate: allergies, current medications, past family history, past medical history, past social history, past surgical history and problem list.    Social History     Tobacco Use    Smoking status: Former     Current packs/day: 0.00     Types: Electronic Cigarette, Cigarettes     Quit date: 2014     Years since quitting: 10.2     Passive exposure: Past    Smokeless tobacco: Never   Vaping Use    Vaping status: Every Day    Substances: Nicotine   Substance Use Topics    Alcohol use: No    Drug use: No       Review of Systems   Constitutional: Negative for decreased appetite and malaise/fatigue.   Cardiovascular:  Negative for chest pain, dyspnea on exertion and palpitations.   Respiratory:  Negative for cough and shortness of breath.        Objective   Vitals:    03/22/24 0958   BP: 134/70   Pulse: 65   SpO2: 95%   Weight: 77.3 kg (170 lb 6.4 oz)   Height: 167.6 cm (66\")     Body mass index is 27.5 kg/m².        Vitals reviewed.   Constitutional:       Appearance: Healthy appearance. Well-developed and not in distress.   HENT:      Head: Normocephalic and atraumatic.   Neck:      Vascular: No carotid bruit or JVD.   Pulmonary:      Effort: Pulmonary effort is normal.      Breath sounds: Normal breath sounds. No wheezing. No rales.   Cardiovascular:      Normal rate. " Regular rhythm.      Murmurs: There is no murmur.      . No S3 and S4 gallop.   Edema:     Peripheral edema absent.   Abdominal:      General: Bowel sounds are normal.      Palpations: Abdomen is soft.   Skin:     General: Skin is warm and dry.   Neurological:      Mental Status: Alert, oriented to person, place, and time and oriented to person, place and time.   Psychiatric:         Mood and Affect: Mood normal.         Behavior: Behavior normal.         Lab Results   Component Value Date     (H) 03/21/2024    K 3.4 (L) 03/21/2024     (H) 03/21/2024    CO2 28.0 03/21/2024    BUN 10 03/21/2024    CREATININE 0.80 03/21/2024    GLUCOSE 95 03/21/2024    CALCIUM 9.3 03/21/2024    AST 14 03/06/2024    ALT 15 03/06/2024    ALKPHOS 78 03/06/2024     Lab Results   Component Value Date    WBC 7.59 03/21/2024    HGB 9.5 (L) 03/21/2024    HCT 33.7 (L) 03/21/2024     (H) 03/21/2024     Lab Results   Component Value Date    TSH 1.390 03/21/2024    TRIG 96 10/18/2022    HDL 55 10/18/2022    LDL 85 10/18/2022          Results for orders placed during the hospital encounter of 10/18/22    Adult Transthoracic Echo Complete w/ Color, Spectral and Contrast if necessary per protocol    Interpretation Summary    Left ventricular ejection fraction appears to be 51 - 55%.    Left ventricular wall thickness is consistent with mild to moderate septal asymmetric hypertrophy.    Left ventricular diastolic function is consistent with (grade I) impaired relaxation.    Estimated right ventricular systolic pressure from tricuspid regurgitation is normal (<35 mmHg).     Results for orders placed during the hospital encounter of 08/26/22    Stress Test With Myocardial Perfusion One Day    Interpretation Summary  · A pharmacological stress test was performed using regadenoson without low-level exercise.  · Findings consistent with a normal ECG stress test.  · Myocardial perfusion imaging indicates a normal myocardial perfusion  study with no evidence of ischemia.  · Normal LV cavity size. Normal LV wall motion noted.  · Left ventricular ejection fraction is hyperdynamic (Calculated EF > 70%). .  · Impressions are consistent with a low risk study.       Results for orders placed during the hospital encounter of 10/18/22    Cardiac Catheterization/Vascular Study    Conclusion    Moderate CAD noted worse seen in the     Aggressive recertification        Procedures      Assessment & Plan    Diagnosis Plan   1. Paroxysmal atrial fibrillation  CBC & Differential      2. Symptomatic anemia  CBC & Differential                   Recommendations:    Paroxysmal atrial fibrillation - CHADSVASc score is 3. Will resume Eliquis since hemoglobin is improving. Will check CBC in 4 days. If hemoglobin declines, will have to consider holding anticoagulation. I advised her to keep her appointment with EP on 4/12/24 to discuss possible Watchman Device. Continue flecainide. She cannot tolerate metoprolol due to bradycardia.  Symptomatic anemia - improving. Continue iron supplementation. If hemoglobin begins to decline, will need to refer to GI for pillcam.    Plan of care discussed with Dr. Arceo    Return in about 4 weeks (around 4/19/2024) for Recheck.    As always, I appreciate very much the opportunity to participate in the cardiovascular care of your patients.      With Best Regards,    GAYLE Haskins

## 2024-03-22 NOTE — PROGRESS NOTES
"Patient Name:  Ivis Vela  YOB: 1955  [id number]    Surgery Progress Note    Date of visit: 3/22/2024    Subjective   Subjective:   Ivis Vela is a 68 y.o.  female vape user, with history of paroxysmal atrial fibrillation and had been on Eliquis (last dose was 3/2), who presented my office with low hemoglobin.  She states she had some severe constipation around [place] time then resolved with stool softeners and noticed some dark discoloration of her stool but otherwise has normal bowel movements now.  She denies any melena or hematochezia currently.  She does not take any iron supplementation.  Denies NSAID use.  Reports lightheadedness but has not lost consciousness     She has since undergone an unrevealing EGD and colonoscopy that demonstrated a single polyp. Still hasn't taken her eliquis. On Fe supplement       Objective     Objective:     /78 (BP Location: Left arm)   Pulse 84   Ht 167.6 cm (66\")   Wt 76.6 kg (168 lb 12.8 oz)   BMI 27.25 kg/m²       Constitution: No acute distress  L:  Symmetric chest expansion. No respiratory distress  Abd:   soft, nondistended, nontender   Ext:  No cyanosis, clubbing, edema    Recent labs that are back at this time have been reviewed.   Hemoglobin 9.5 from 8.8         Assessment & Plan     Assessment/ Plan:    68-year-old female with improving anemia    Endoscopy was unrevealing.  Repeat colonoscopy in 7 years  From surgical standpoint, okay to restart Eliquis.  If continued issues with bleeding, recommend referral to gastroenterology for pill endoscopy        Rufino Cohen MD  [facility]  3/22/2024  14:10 EDT      "

## 2024-03-27 ENCOUNTER — TELEPHONE (OUTPATIENT)
Dept: CARDIOLOGY | Facility: CLINIC | Age: 69
End: 2024-03-27
Payer: MEDICARE

## 2024-03-27 NOTE — TELEPHONE ENCOUNTER
Hub to relay.     Called pt to advise her that we got her HSS report back and it didn't have any data on it. It stated that she didn't wear the monitor.

## 2024-03-28 ENCOUNTER — TELEPHONE (OUTPATIENT)
Dept: CARDIOLOGY | Facility: CLINIC | Age: 69
End: 2024-03-28
Payer: MEDICARE

## 2024-03-28 ENCOUNTER — TRANSCRIBE ORDERS (OUTPATIENT)
Dept: ADMINISTRATIVE | Facility: HOSPITAL | Age: 69
End: 2024-03-28
Payer: MEDICARE

## 2024-03-28 DIAGNOSIS — E04.9 ENLARGEMENT OF THYROID: Primary | ICD-10-CM

## 2024-03-28 NOTE — TELEPHONE ENCOUNTER
Patient was supposed to have a CBC at her PCP office this week.  Will you please request results?  Thanks.

## 2024-03-28 NOTE — TELEPHONE ENCOUNTER
Spoke with patient and she sated she has had this done at least twice since then   She verified PCP.  Sh stated she would call them and get this herself.

## 2024-04-04 ENCOUNTER — HOSPITAL ENCOUNTER (OUTPATIENT)
Facility: HOSPITAL | Age: 69
Discharge: HOME | End: 2024-04-04
Admitting: FAMILY MEDICINE
Payer: MEDICARE

## 2024-04-04 DIAGNOSIS — E04.9 ENLARGEMENT OF THYROID: ICD-10-CM

## 2024-04-04 PROCEDURE — 76536 US EXAM OF HEAD AND NECK: CPT

## 2024-04-12 ENCOUNTER — OFFICE VISIT (OUTPATIENT)
Dept: CARDIOLOGY | Facility: CLINIC | Age: 69
End: 2024-04-12
Payer: MEDICARE

## 2024-04-12 ENCOUNTER — TRANSCRIBE ORDERS (OUTPATIENT)
Dept: ADMINISTRATIVE | Facility: HOSPITAL | Age: 69
End: 2024-04-12
Payer: MEDICARE

## 2024-04-12 VITALS
SYSTOLIC BLOOD PRESSURE: 110 MMHG | WEIGHT: 171 LBS | OXYGEN SATURATION: 94 % | HEART RATE: 93 BPM | DIASTOLIC BLOOD PRESSURE: 62 MMHG | BODY MASS INDEX: 27.48 KG/M2 | HEIGHT: 66 IN

## 2024-04-12 DIAGNOSIS — I49.3 PVC'S (PREMATURE VENTRICULAR CONTRACTIONS): ICD-10-CM

## 2024-04-12 DIAGNOSIS — I49.1 PREMATURE ATRIAL CONTRACTIONS: ICD-10-CM

## 2024-04-12 DIAGNOSIS — I48.0 PAROXYSMAL ATRIAL FIBRILLATION: Primary | ICD-10-CM

## 2024-04-13 NOTE — PROGRESS NOTES
Cardiac Electrophysiology Outpatient Note  [facility] at [facility]    Office Visit     Ivis Vela  [id number]  11/04/2022    Primary Care Physician: Lawrence Faulkner DO    Referred By: No ref. provider found    CC: PAF, VT    Problem List:  Paroxsymal atrial fibrillation  CHADS2Vasc=3, on eliquis  30 day event monitor 09/2022: Predominantly sinus heart rate ranging from  bpm, frequent PACs and PVCs with short runs of A. fib, ventricular bigeminy.  5 beat and 25 beat run of VT, rate around 200 bpm.  Patient reported symptoms of heart fluttering, dizziness, chest pain correlated with normal sinus rhythm. 3% PVCs, 7% PACs  Ventricular tachycardia  30 day event monitor 09/2022:  5 beat and 25 beat run of VT, rate around 200 bpm.   Echo 10/19/2022: EF 51-55%, mild to moderate septal asymmetric hypertrophy, grade I diastolic dysfunction  CAD, Chest pain  Stress test 08/26/2022: no evidence of ischemia, low risk study  C 10/19/2022: moderate CAD, no stents placed, aggressive medical management and risk factor stratification   ED visit [facility] for CP 11/01/2022, negative troponin, negative work up  Hypertension  Asthma  Former tobacco abuse    History of Present Illness:   Ivis Vela is a 68 y.o. female who presents to the electrophysiology clinic for follow-up of atrial fibrillation.  She has been treated with flecainide without clear recurrence of atrial fibrillation.  She wore an ambulatory monitor showing no atrial fibrillation 8% PACs.  Overall feels as though her symptoms are much better since starting flecainide.  She was hospitalized for significant anemia.  There was concern that she may be bleeding, however no obvious bleeding was found.  She underwent EGD and colonoscopy without clear source of bleeding.  Eliquis was initially stopped but was then restarted.  Her primary cardiology team was requesting discussion of watchman implantation.        Past Surgical History:    Procedure Laterality Date    BREAST BIOPSY      CARDIAC CATHETERIZATION N/A 10/19/2022    Procedure: Left Heart Cath;  Surgeon: Piotr Edwards DO;  Location:  [facility]ATH [facility];  Service: Cardiology;  Laterality: N/A;    CARPAL TUNNEL RELEASE Bilateral     CATARACT EXTRACTION      CHOLECYSTECTOMY      COLONOSCOPY N/A 3/15/2024    Procedure: COLONOSCOPY;  Surgeon: Rufino Cohen MD;  Location:  [facility] OR;  Service: Gastroenterology;  Laterality: N/A;    ENDOSCOPY N/A 3/8/2024    Procedure: ESOPHAGOGASTRODUODENOSCOPY WITH ANESTHESIA;  Surgeon: Rufino Cohen MD;  Location:  [facility] OR;  Service: Gastroenterology;  Laterality: N/A;    ESOPHAGOSCOPY W/ DILATION      HYSTERECTOMY         Family History   Problem Relation Age of Onset    Hypertension Mother     Heart disease Father     Hypertension Father     Hypertension Sister     Heart disease Brother     Hypertension Brother     Diabetes Brother     Breast cancer Paternal Cousin     Breast cancer Maternal Cousin        Social History     Socioeconomic History    Marital status:    Tobacco Use    Smoking status: Former     Current packs/day: 0.00     Types: Electronic Cigarette, Cigarettes     Quit date: 2014     Years since quitting: 10.2     Passive exposure: Past    Smokeless tobacco: Never   Vaping Use    Vaping status: Every Day    Substances: Nicotine   Substance and Sexual Activity    Alcohol use: No    Drug use: No    Sexual activity: Defer         Current Outpatient Medications:     atorvastatin (LIPITOR) 80 MG tablet, TAKE 1 TABLET DAILY, Disp: 90 tablet, Rfl: 3    FeroSul 325 (65 Fe) MG tablet, Take 1 tablet by mouth Daily., Disp: , Rfl:     flecainide (TAMBOCOR) 100 MG tablet, Take 1 tablet by mouth 2 (Two) Times a Day., Disp: 60 tablet, Rfl: 5    isosorbide mononitrate (IMDUR) 60 MG 24 hr tablet, Take 1 tablet by mouth Daily., Disp: 30 tablet, Rfl: 5    losartan-hydrochlorothiazide (HYZAAR) 50-12.5 MG per tablet, Take 1 tablet by  "mouth Daily., Disp: , Rfl:     nitroglycerin (NITROSTAT) 0.4 MG SL tablet, 1 under the tongue as needed for angina, may repeat q5mins for up three doses, Disp: 25 tablet, Rfl: 2    pantoprazole (PROTONIX) 40 MG EC tablet, Take 1 tablet by mouth Daily., Disp: , Rfl:     apixaban (ELIQUIS) 5 MG tablet tablet, Take 1 tablet by mouth 2 (Two) Times a Day. (Patient not taking: Reported on 4/12/2024), Disp: 60 tablet, Rfl: 5    Allergies:   No Known Allergies    Review of Systems:  Review of Systems   Constitutional: Positive for malaise/fatigue.   Cardiovascular:  Positive for chest pain, irregular heartbeat, near-syncope and palpitations. Negative for dyspnea on exertion, leg swelling, orthopnea, paroxysmal nocturnal dyspnea and syncope.   Respiratory:  Negative for shortness of breath.    Neurological:  Positive for dizziness and light-headedness.        Vital Signs: Blood pressure 110/62, pulse 93, height 167.6 cm (66\"), weight 77.6 kg (171 lb), SpO2 94%.    Physical Exam  Vitals reviewed.   Cardiovascular:      Rate and Rhythm: Normal rate. Rhythm irregular.      Heart sounds: Normal heart sounds.   Pulmonary:      Effort: Pulmonary effort is normal.      Breath sounds: Normal breath sounds.   Musculoskeletal:      Right lower leg: No edema.      Left lower leg: No edema.   Neurological:      Mental Status: She is alert and oriented to person, place, and time.   Psychiatric:         Behavior: Behavior is cooperative.         Lab Results   Component Value Date    GLUCOSE 95 03/21/2024    CALCIUM 9.3 03/21/2024     (H) 03/21/2024    K 3.4 (L) 03/21/2024    CO2 28.0 03/21/2024     (H) 03/21/2024    BUN 10 03/21/2024    CREATININE 0.80 03/21/2024    EGFRIFNONA 75 07/24/2020    BCR 12.5 03/21/2024    ANIONGAP 10.0 03/21/2024     Lab Results   Component Value Date    WBC 7.59 03/21/2024    HGB 9.5 (L) 03/21/2024    HCT 33.7 (L) 03/21/2024    MCV 81.8 03/21/2024     (H) 03/21/2024     Lab Results "   Component Value Date    INR 0.94 04/24/2023    INR 1.04 11/01/2022    INR 1.05 10/19/2022    PROTIME 13.1 04/24/2023    PROTIME 13.9 11/01/2022    PROTIME 13.9 10/19/2022     Lab Results   Component Value Date    TSH 1.390 03/21/2024        Results for orders placed during the hospital encounter of 10/18/22    Adult Transthoracic Echo Complete w/ Color, Spectral and Contrast if necessary per protocol    Interpretation Summary    Left ventricular ejection fraction appears to be 51 - 55%.    Left ventricular wall thickness is consistent with mild to moderate septal asymmetric hypertrophy.    Left ventricular diastolic function is consistent with (grade I) impaired relaxation.    Estimated right ventricular systolic pressure from tricuspid regurgitation is normal (<35 mmHg).      I personally viewed and interpreted the patient's EKG/Telemetry/lab data.        EKG interpreted, shows normal sinus rhythm with nonspecific ST changes    Procedures    Ivis Vela  reports that she quit smoking about 10 years ago. Her smoking use included electronic cigarette and cigarettes. She has been exposed to tobacco smoke. She has never used smokeless tobacco.    Assessment & Plan    1. Paroxysmal atrial fibrillation (HCC)  -Event monitor in September 2022 revealing short runs of A. fib.  Currently on flecainide 100 mg twice daily and doing well with this.  No clear recurrence of atrial fibrillation.  He has been treated with Eliquis.  Of note, she had hospitalization for significant anemia.  There was no clear bleeding with this.  She presents today for discussion of watchman implantation.  We do long discussion about the pros and cons of this.  Since there is no clear bleeding associated with her anemia, I am uncertain how much benefit watchman implantation would have.  There also are clear downsides associated with this.  Her hemoglobin is increasing with iron supplementation.  After this discussion, for the current time I  recommended continued trial of anticoagulation and see how she does with this.  Will reassess in 3 months.  Will continue Eliquis and stop aspirin.    2. PVC's (premature ventricular contractions)  -3% PVC burden noted on monitor.  No clear symptoms with this at the current time.    3. Premature atrial contractions  -8% PAC burden noted on monitor.  No clear symptoms currently.  Will continue to monitor.  Continue flecainide.        Follow Up:  Return in about 3 months (around 7/12/2024) for Recheck.

## 2024-04-19 ENCOUNTER — OFFICE VISIT (OUTPATIENT)
Dept: CARDIOLOGY | Facility: CLINIC | Age: 69
End: 2024-04-19
Payer: MEDICARE

## 2024-04-19 VITALS
WEIGHT: 173 LBS | BODY MASS INDEX: 27.8 KG/M2 | HEART RATE: 71 BPM | SYSTOLIC BLOOD PRESSURE: 133 MMHG | OXYGEN SATURATION: 98 % | DIASTOLIC BLOOD PRESSURE: 75 MMHG | HEIGHT: 66 IN

## 2024-04-19 DIAGNOSIS — I10 ESSENTIAL HYPERTENSION: ICD-10-CM

## 2024-04-19 DIAGNOSIS — D64.9 SYMPTOMATIC ANEMIA: ICD-10-CM

## 2024-04-19 DIAGNOSIS — I48.0 PAROXYSMAL ATRIAL FIBRILLATION: Primary | ICD-10-CM

## 2024-04-19 DIAGNOSIS — I25.10 ASCVD (ARTERIOSCLEROTIC CARDIOVASCULAR DISEASE): ICD-10-CM

## 2024-04-19 PROCEDURE — 1159F MED LIST DOCD IN RCRD: CPT | Performed by: NURSE PRACTITIONER

## 2024-04-19 PROCEDURE — 3075F SYST BP GE 130 - 139MM HG: CPT | Performed by: NURSE PRACTITIONER

## 2024-04-19 PROCEDURE — 3078F DIAST BP <80 MM HG: CPT | Performed by: NURSE PRACTITIONER

## 2024-04-19 PROCEDURE — 99213 OFFICE O/P EST LOW 20 MIN: CPT | Performed by: NURSE PRACTITIONER

## 2024-04-19 PROCEDURE — 1160F RVW MEDS BY RX/DR IN RCRD: CPT | Performed by: NURSE PRACTITIONER

## 2024-04-19 NOTE — LETTER
April 19, 2024     Lawrence Faulkner DO  [address] [place] [address] Drive  [address]  [place] [phone]    Patient: Ivis Vela   YOB: 1955   Date of Visit: 4/19/2024     Dear Lawrence Faulkner DO:       Thank you for referring Ivis Vela to me for evaluation. Below are the relevant portions of my assessment and plan of care.    If you have questions, please do not hesitate to call me. I look forward to following Ivis along with you.         Sincerely,        GAYLE Haskins        CC: No Recipients    Baylee Chaudhary APRN  04/19/24 0947  Sign when Signing Visit  Lawrence Faulkner DO  Ivis Vela  1955 04/19/2024    Patient Active Problem List   Diagnosis   • Hypertension   • Asthma   • Osteoporosis   • Left arm pain   • Closed nondisplaced fracture of head of radius with routine healing   • Palpitations   • Dizziness and giddiness   • Unstable angina   • Paroxysmal atrial fibrillation   • PVC's (premature ventricular contractions)   • Premature atrial contractions   • VT (ventricular tachycardia)   • Anemia       Dear Lawrence Faulkner DO:    Subjective    Chief Complaint   Patient presents with   • Follow-up     4 WEEK FOLLOW UP LABS   • Med Management     Med list           History of Present Illness:    Ivis Vela is a 68 y.o. female with a past medical history of paroxysmal atrial fibrillation on Eliquis, nonobstructive CAD, PVCs, symptomatic PACs with an 8% burden on recent Holter, and recent symptomatic anemia with hemoglobin as low as 8.5.  She presents today for cardiology follow-up.  Iron supplementation has been maintained.  Endoscopies were unremarkable.  She reports PCP is monitoring hemoglobin and has gradually been increasing.  Hemoglobin was 9.5 last in our system.  However, she reports a hemoglobin of 9.8 recently at PCP office.  Denies any bleeding issues.  Was recently evaluated by EP for possible Watchman device but decided to hold off for now.  Overall, she is feeling much  better.  She does have some sinus tachycardia up to 115 bpm on home monitor when she is very physically active such as working outside.  She has only been taking half tablet of the losartan/HCTZ.  Her BP has been high in the mornings.        No Known Allergies:      Current Outpatient Medications:   •  apixaban (ELIQUIS) 5 MG tablet tablet, Take 1 tablet by mouth 2 (Two) Times a Day., Disp: 60 tablet, Rfl: 5  •  atorvastatin (LIPITOR) 80 MG tablet, TAKE 1 TABLET DAILY, Disp: 90 tablet, Rfl: 3  •  flecainide (TAMBOCOR) 100 MG tablet, Take 1 tablet by mouth 2 (Two) Times a Day., Disp: 60 tablet, Rfl: 5  •  isosorbide mononitrate (IMDUR) 60 MG 24 hr tablet, Take 1 tablet by mouth Daily., Disp: 30 tablet, Rfl: 5  •  losartan-hydrochlorothiazide (HYZAAR) 50-12.5 MG per tablet, Take 1 tablet by mouth Daily., Disp: , Rfl:   •  nitroglycerin (NITROSTAT) 0.4 MG SL tablet, 1 under the tongue as needed for angina, may repeat q5mins for up three doses, Disp: 25 tablet, Rfl: 2  •  pantoprazole (PROTONIX) 40 MG EC tablet, Take 1 tablet by mouth Daily., Disp: , Rfl:   •  FeroSul 325 (65 Fe) MG tablet, Take 1 tablet by mouth Daily., Disp: , Rfl:       The following portions of the patient's history were reviewed and updated as appropriate: allergies, current medications, past family history, past medical history, past social history, past surgical history and problem list.    Social History     Tobacco Use   • Smoking status: Former     Current packs/day: 0.00     Types: Electronic Cigarette, Cigarettes     Quit date: 2014     Years since quitting: 10.3     Passive exposure: Past   • Smokeless tobacco: Never   Vaping Use   • Vaping status: Every Day   • Substances: Nicotine   Substance Use Topics   • Alcohol use: No   • Drug use: No       Review of Systems   Constitutional: Negative for decreased appetite and malaise/fatigue.   Cardiovascular:  Negative for chest pain, dyspnea on exertion and palpitations.   Respiratory:  Negative  "for cough and shortness of breath.        Objective  Vitals:    04/19/24 0854   BP: 133/75   BP Location: Left arm   Patient Position: Sitting   Cuff Size: Adult   Pulse: 71   SpO2: 98%   Weight: 78.5 kg (173 lb)   Height: 167.6 cm (65.98\")     Body mass index is 27.94 kg/m².        Vitals reviewed.   Constitutional:       Appearance: Healthy appearance. Well-developed and not in distress.   HENT:      Head: Normocephalic and atraumatic.   Neck:      Vascular: No carotid bruit or JVD.   Pulmonary:      Effort: Pulmonary effort is normal.      Breath sounds: Normal breath sounds. No wheezing. No rales.   Cardiovascular:      Normal rate. Regular rhythm.      Murmurs: There is no murmur.      . No S3 and S4 gallop.   Edema:     Peripheral edema absent.   Abdominal:      General: Bowel sounds are normal.      Palpations: Abdomen is soft.   Skin:     General: Skin is warm and dry.   Neurological:      Mental Status: Alert, oriented to person, place, and time and oriented to person, place and time.   Psychiatric:         Mood and Affect: Mood normal.         Behavior: Behavior normal.         Lab Results   Component Value Date     (H) 03/21/2024    K 3.4 (L) 03/21/2024     (H) 03/21/2024    CO2 28.0 03/21/2024    BUN 10 03/21/2024    CREATININE 0.80 03/21/2024    GLUCOSE 95 03/21/2024    CALCIUM 9.3 03/21/2024    AST 14 03/06/2024    ALT 15 03/06/2024    ALKPHOS 78 03/06/2024     Lab Results   Component Value Date    WBC 7.59 03/21/2024    HGB 9.5 (L) 03/21/2024    HCT 33.7 (L) 03/21/2024     (H) 03/21/2024     Lab Results   Component Value Date    TSH 1.390 03/21/2024    TRIG 96 10/18/2022    HDL 55 10/18/2022    LDL 85 10/18/2022          Results for orders placed during the hospital encounter of 10/18/22    Adult Transthoracic Echo Complete w/ Color, Spectral and Contrast if necessary per protocol    Interpretation Summary  •  Left ventricular ejection fraction appears to be 51 - 55%.  •  Left " ventricular wall thickness is consistent with mild to moderate septal asymmetric hypertrophy.  •  Left ventricular diastolic function is consistent with (grade I) impaired relaxation.  •  Estimated right ventricular systolic pressure from tricuspid regurgitation is normal (<35 mmHg).     Results for orders placed during the hospital encounter of 08/26/22    Stress Test With Myocardial Perfusion One Day    Interpretation Summary  · A pharmacological stress test was performed using regadenoson without low-level exercise.  · Findings consistent with a normal ECG stress test.  · Myocardial perfusion imaging indicates a normal myocardial perfusion study with no evidence of ischemia.  · Normal LV cavity size. Normal LV wall motion noted.  · Left ventricular ejection fraction is hyperdynamic (Calculated EF > 70%). .  · Impressions are consistent with a low risk study.       Results for orders placed during the hospital encounter of 10/18/22    Cardiac Catheterization/Vascular Study    Conclusion  •  Moderate CAD noted worse seen in the [facility] recertification        Procedures      Assessment & Plan   Diagnosis Plan   1. Paroxysmal atrial fibrillation        2. Symptomatic anemia        3. Essential hypertension        4. ASCVD (arteriosclerotic cardiovascular disease)                     Recommendations:    Paroxysmal atrial fibrillation-maintaining sinus rhythm.  Continue flecainide and Eliquis.  Symptomatic anemia-hemoglobin stable.  Will continue to monitor.  She reports PCP has ordered labs to check this again.  Essential hypertension-uncontrolled in the a.m.  She is going to resume taking 1 tablet of the Hyzaar every day.  CAD-no recent anginal symptoms.  EP discontinued aspirin.  Continue with atorvastatin and isosorbide.  Follow-up in 3 months or sooner if needed.        Return in about 3 months (around 7/19/2024) for Recheck.    As always, I appreciate very much the opportunity to participate in the  cardiovascular care of your patients.      With Best Regards,    GAYLE Haskins

## 2024-04-19 NOTE — PROGRESS NOTES
Lawrence Faulkner DO  Ivis Vela  1955 04/19/2024    Patient Active Problem List   Diagnosis    Hypertension    Asthma    Osteoporosis    Left arm pain    Closed nondisplaced fracture of head of radius with routine healing    Palpitations    Dizziness and giddiness    Unstable angina    Paroxysmal atrial fibrillation    PVC's (premature ventricular contractions)    Premature atrial contractions    VT (ventricular tachycardia)    Anemia       Dear Lawrence Faulkner DO:    Subjective     Chief Complaint   Patient presents with    Follow-up     4 WEEK FOLLOW UP LABS    Med Management     Med list           History of Present Illness:    Ivis Vela is a 68 y.o. female with a past medical history of paroxysmal atrial fibrillation on Eliquis, nonobstructive CAD, PVCs, symptomatic PACs with an 8% burden on recent Holter, and recent symptomatic anemia with hemoglobin as low as 8.5.  She presents today for cardiology follow-up.  Iron supplementation has been maintained.  Endoscopies were unremarkable.  She reports PCP is monitoring hemoglobin and has gradually been increasing.  Hemoglobin was 9.5 last in our system.  However, she reports a hemoglobin of 9.8 recently at PCP office.  Denies any bleeding issues.  Was recently evaluated by EP for possible Watchman device but decided to hold off for now.  Overall, she is feeling much better.  She does have some sinus tachycardia up to 115 bpm on home monitor when she is very physically active such as working outside.  She has only been taking half tablet of the losartan/HCTZ.  Her BP has been high in the mornings.        No Known Allergies:      Current Outpatient Medications:     apixaban (ELIQUIS) 5 MG tablet tablet, Take 1 tablet by mouth 2 (Two) Times a Day., Disp: 60 tablet, Rfl: 5    atorvastatin (LIPITOR) 80 MG tablet, TAKE 1 TABLET DAILY, Disp: 90 tablet, Rfl: 3    flecainide (TAMBOCOR) 100 MG tablet, Take 1 tablet by mouth 2 (Two) Times a Day., Disp: 60  "tablet, Rfl: 5    isosorbide mononitrate (IMDUR) 60 MG 24 hr tablet, Take 1 tablet by mouth Daily., Disp: 30 tablet, Rfl: 5    losartan-hydrochlorothiazide (HYZAAR) 50-12.5 MG per tablet, Take 1 tablet by mouth Daily., Disp: , Rfl:     nitroglycerin (NITROSTAT) 0.4 MG SL tablet, 1 under the tongue as needed for angina, may repeat q5mins for up three doses, Disp: 25 tablet, Rfl: 2    pantoprazole (PROTONIX) 40 MG EC tablet, Take 1 tablet by mouth Daily., Disp: , Rfl:     FeroSul 325 (65 Fe) MG tablet, Take 1 tablet by mouth Daily., Disp: , Rfl:       The following portions of the patient's history were reviewed and updated as appropriate: allergies, current medications, past family history, past medical history, past social history, past surgical history and problem list.    Social History     Tobacco Use    Smoking status: Former     Current packs/day: 0.00     Types: Electronic Cigarette, Cigarettes     Quit date: 2014     Years since quitting: 10.3     Passive exposure: Past    Smokeless tobacco: Never   Vaping Use    Vaping status: Every Day    Substances: Nicotine   Substance Use Topics    Alcohol use: No    Drug use: No       Review of Systems   Constitutional: Negative for decreased appetite and malaise/fatigue.   Cardiovascular:  Negative for chest pain, dyspnea on exertion and palpitations.   Respiratory:  Negative for cough and shortness of breath.        Objective   Vitals:    04/19/24 0854   BP: 133/75   BP Location: Left arm   Patient Position: Sitting   Cuff Size: Adult   Pulse: 71   SpO2: 98%   Weight: 78.5 kg (173 lb)   Height: 167.6 cm (65.98\")     Body mass index is 27.94 kg/m².        Vitals reviewed.   Constitutional:       Appearance: Healthy appearance. Well-developed and not in distress.   HENT:      Head: Normocephalic and atraumatic.   Neck:      Vascular: No carotid bruit or JVD.   Pulmonary:      Effort: Pulmonary effort is normal.      Breath sounds: Normal breath sounds. No wheezing. No " rales.   Cardiovascular:      Normal rate. Regular rhythm.      Murmurs: There is no murmur.      . No S3 and S4 gallop.   Edema:     Peripheral edema absent.   Abdominal:      General: Bowel sounds are normal.      Palpations: Abdomen is soft.   Skin:     General: Skin is warm and dry.   Neurological:      Mental Status: Alert, oriented to person, place, and time and oriented to person, place and time.   Psychiatric:         Mood and Affect: Mood normal.         Behavior: Behavior normal.         Lab Results   Component Value Date     (H) 03/21/2024    K 3.4 (L) 03/21/2024     (H) 03/21/2024    CO2 28.0 03/21/2024    BUN 10 03/21/2024    CREATININE 0.80 03/21/2024    GLUCOSE 95 03/21/2024    CALCIUM 9.3 03/21/2024    AST 14 03/06/2024    ALT 15 03/06/2024    ALKPHOS 78 03/06/2024     Lab Results   Component Value Date    WBC 7.59 03/21/2024    HGB 9.5 (L) 03/21/2024    HCT 33.7 (L) 03/21/2024     (H) 03/21/2024     Lab Results   Component Value Date    TSH 1.390 03/21/2024    TRIG 96 10/18/2022    HDL 55 10/18/2022    LDL 85 10/18/2022          Results for orders placed during the hospital encounter of 10/18/22    Adult Transthoracic Echo Complete w/ Color, Spectral and Contrast if necessary per protocol    Interpretation Summary    Left ventricular ejection fraction appears to be 51 - 55%.    Left ventricular wall thickness is consistent with mild to moderate septal asymmetric hypertrophy.    Left ventricular diastolic function is consistent with (grade I) impaired relaxation.    Estimated right ventricular systolic pressure from tricuspid regurgitation is normal (<35 mmHg).     Results for orders placed during the hospital encounter of 08/26/22    Stress Test With Myocardial Perfusion One Day    Interpretation Summary  · A pharmacological stress test was performed using regadenoson without low-level exercise.  · Findings consistent with a normal ECG stress test.  · Myocardial perfusion imaging  indicates a normal myocardial perfusion study with no evidence of ischemia.  · Normal LV cavity size. Normal LV wall motion noted.  · Left ventricular ejection fraction is hyperdynamic (Calculated EF > 70%). .  · Impressions are consistent with a low risk study.       Results for orders placed during the hospital encounter of 10/18/22    Cardiac Catheterization/Vascular Study    Conclusion    Moderate CAD noted worse seen in the     [facility] recertification        Procedures      Assessment & Plan    Diagnosis Plan   1. Paroxysmal atrial fibrillation        2. Symptomatic anemia        3. Essential hypertension        4. ASCVD (arteriosclerotic cardiovascular disease)                     Recommendations:    Paroxysmal atrial fibrillation-maintaining sinus rhythm.  Continue flecainide and Eliquis.  Symptomatic anemia-hemoglobin stable.  Will continue to monitor.  She reports PCP has ordered labs to check this again.  Essential hypertension-uncontrolled in the a.m.  She is going to resume taking 1 tablet of the Hyzaar every day.  CAD-no recent anginal symptoms.  EP discontinued aspirin.  Continue with atorvastatin and isosorbide.  Follow-up in 3 months or sooner if needed.        Return in about 3 months (around 7/19/2024) for Recheck.    As always, I appreciate very much the opportunity to participate in the cardiovascular care of your patients.      With Best Regards,    GAYLE Haskins

## 2024-04-24 ENCOUNTER — HOSPITAL ENCOUNTER (OUTPATIENT)
Dept: ULTRASOUND IMAGING | Facility: HOSPITAL | Age: 69
Discharge: HOME | End: 2024-04-24
Admitting: FAMILY MEDICINE
Payer: MEDICARE

## 2024-04-24 VITALS
HEART RATE: 76 BPM | OXYGEN SATURATION: 97 % | SYSTOLIC BLOOD PRESSURE: 181 MMHG | DIASTOLIC BLOOD PRESSURE: 98 MMHG | RESPIRATION RATE: 16 BRPM

## 2024-04-24 DIAGNOSIS — E04.9 ENLARGEMENT OF THYROID: ICD-10-CM

## 2024-04-24 PROCEDURE — 10005 FNA BX W/US GDN 1ST LES: CPT | Performed by: RADIOLOGY

## 2024-04-24 PROCEDURE — 88305 TISSUE EXAM BY PATHOLOGIST: CPT

## 2024-04-24 PROCEDURE — 88173 CYTOPATH EVAL FNA REPORT: CPT

## 2024-04-24 PROCEDURE — 76942 ECHO GUIDE FOR BIOPSY: CPT

## 2024-04-25 LAB — REF LAB TEST METHOD: NORMAL

## 2024-06-26 ENCOUNTER — TRANSCRIBE ORDERS (OUTPATIENT)
Dept: ADMINISTRATIVE | Facility: HOSPITAL | Age: 69
End: 2024-06-26
Payer: MEDICARE

## 2024-06-26 DIAGNOSIS — Z12.31 VISIT FOR SCREENING MAMMOGRAM: Primary | ICD-10-CM

## 2024-07-15 ENCOUNTER — HOSPITAL ENCOUNTER (OUTPATIENT)
Facility: HOSPITAL | Age: 69
Discharge: HOME | End: 2024-07-15
Admitting: FAMILY MEDICINE
Payer: MEDICARE

## 2024-07-15 DIAGNOSIS — Z12.31 VISIT FOR SCREENING MAMMOGRAM: ICD-10-CM

## 2024-07-15 PROCEDURE — 77067 SCR MAMMO BI INCL CAD: CPT

## 2024-07-15 PROCEDURE — 77063 BREAST TOMOSYNTHESIS BI: CPT

## 2024-07-15 PROCEDURE — 77063 BREAST TOMOSYNTHESIS BI: CPT | Performed by: RADIOLOGY

## 2024-07-15 PROCEDURE — 77067 SCR MAMMO BI INCL CAD: CPT | Performed by: RADIOLOGY

## 2024-08-22 ENCOUNTER — TRANSCRIBE ORDERS (OUTPATIENT)
Dept: ADMINISTRATIVE | Facility: HOSPITAL | Age: 69
End: 2024-08-22
Payer: MEDICARE

## 2024-08-22 ENCOUNTER — HOSPITAL ENCOUNTER (OUTPATIENT)
Dept: GENERAL RADIOLOGY | Facility: HOSPITAL | Age: 69
Discharge: HOME | End: 2024-08-22
Payer: MEDICARE

## 2024-08-22 DIAGNOSIS — M54.12 CERVICAL RADICULITIS: Primary | ICD-10-CM

## 2024-08-22 DIAGNOSIS — M54.12 CERVICAL RADICULITIS: ICD-10-CM

## 2024-08-22 PROCEDURE — 72040 X-RAY EXAM NECK SPINE 2-3 VW: CPT

## 2024-09-06 ENCOUNTER — OFFICE VISIT (OUTPATIENT)
Dept: CARDIOLOGY | Facility: CLINIC | Age: 69
End: 2024-09-06
Payer: MEDICARE

## 2024-09-06 VITALS
BODY MASS INDEX: 26.78 KG/M2 | HEART RATE: 74 BPM | SYSTOLIC BLOOD PRESSURE: 154 MMHG | WEIGHT: 166.6 LBS | OXYGEN SATURATION: 94 % | DIASTOLIC BLOOD PRESSURE: 90 MMHG | HEIGHT: 66 IN

## 2024-09-06 DIAGNOSIS — Z79.899 LONG TERM CURRENT USE OF ANTIARRHYTHMIC MEDICAL THERAPY: ICD-10-CM

## 2024-09-06 DIAGNOSIS — I49.1 PREMATURE ATRIAL CONTRACTIONS: ICD-10-CM

## 2024-09-06 DIAGNOSIS — I49.3 PVC'S (PREMATURE VENTRICULAR CONTRACTIONS): ICD-10-CM

## 2024-09-06 DIAGNOSIS — I48.0 PAROXYSMAL ATRIAL FIBRILLATION: Primary | ICD-10-CM

## 2024-09-06 PROCEDURE — 1160F RVW MEDS BY RX/DR IN RCRD: CPT

## 2024-09-06 PROCEDURE — 99214 OFFICE O/P EST MOD 30 MIN: CPT

## 2024-09-06 PROCEDURE — 3077F SYST BP >= 140 MM HG: CPT

## 2024-09-06 PROCEDURE — 3080F DIAST BP >= 90 MM HG: CPT

## 2024-09-06 PROCEDURE — 1159F MED LIST DOCD IN RCRD: CPT

## 2024-09-06 PROCEDURE — 93000 ELECTROCARDIOGRAM COMPLETE: CPT

## 2024-09-06 RX ORDER — LANCETS
EACH MISCELLANEOUS
COMMUNITY
Start: 2024-08-02

## 2024-09-06 RX ORDER — BLOOD SUGAR DIAGNOSTIC
STRIP MISCELLANEOUS
COMMUNITY
Start: 2024-06-17

## 2024-09-06 RX ORDER — BLOOD-GLUCOSE METER
EACH MISCELLANEOUS
COMMUNITY
Start: 2024-06-11

## 2024-09-06 RX ORDER — LOSARTAN POTASSIUM AND HYDROCHLOROTHIAZIDE 12.5; 1 MG/1; MG/1
0.5 TABLET ORAL DAILY
COMMUNITY
Start: 2024-07-29

## 2024-09-06 RX ORDER — MELOXICAM 15 MG/1
1 TABLET ORAL DAILY
COMMUNITY
Start: 2024-08-21

## 2024-09-06 RX ORDER — METOPROLOL TARTRATE 25 MG/1
25 TABLET, FILM COATED ORAL 2 TIMES DAILY
COMMUNITY

## 2024-09-06 NOTE — PROGRESS NOTES
Cardiac Electrophysiology Outpatient Note  [facility]    Office Visit     Ivis Vela  [id number]  09/06/2024    Primary Care Physician: Lawrence Faulkner DO    Referred By: No ref. provider found    Subjective     Chief Complaint   Patient presents with    Paroxysmal atrial fibrillation     Problem List:  Paroxsymal atrial fibrillation  CHADS2Vasc=3, on eliquis  30 day event monitor 09/2022: Predominantly sinus heart rate ranging from  bpm, frequent PACs and PVCs with short runs of A. fib, ventricular bigeminy.  5 beat and 25 beat run of VT, rate around 200 bpm.  Patient reported symptoms of heart fluttering, dizziness, chest pain correlated with normal sinus rhythm. 3% PVCs, 7% PACs  Ventricular tachycardia  30 day event monitor 09/2022:  5 beat and 25 beat run of VT, rate around 200 bpm.   Echo 10/19/2022: EF 51-55%, mild to moderate septal asymmetric hypertrophy, grade I diastolic dysfunction  CAD, Chest pain  Stress test 08/26/2022: no evidence of ischemia, low risk study  C 10/19/2022: moderate CAD, no stents placed, aggressive medical management and risk factor stratification   ED visit [facility] for CP 11/01/2022, negative troponin, negative work up  Hypertension  Asthma  Former tobacco abuse    History of Present Illness:   Ivis Vela is a 69 y.o. female who presents to my electrophysiology clinic for follow up of paroxysmal atrial fibrillation on flecainide and Eliquis, PVCs, PACs, anemia and hypertension.  Since we last saw the patient, she reports she is still having palpitations that are somewhat bothersome to her.  Otherwise she denies chest pain, shortness of breath, lower extremity edema or syncope.  Her  was diagnosed with cancer and they are going through a lot psychologically and emotionally currently.    Past Medical History:   Diagnosis Date    Arm fracture     Asthma     Atrial fibrillation     GERD (gastroesophageal reflux disease)      Hyperlipidemia     Hypertension     Osteoporosis        Past Surgical History:   Procedure Laterality Date    BREAST BIOPSY      CARDIAC CATHETERIZATION N/A 10/19/2022    Procedure: Left Heart Cath;  Surgeon: Piotr Edwards DO;  Location:  [facility]ATH [facility];  Service: Cardiology;  Laterality: N/A;    CARPAL TUNNEL RELEASE Bilateral     CATARACT EXTRACTION      CHOLECYSTECTOMY      COLONOSCOPY N/A 3/15/2024    Procedure: COLONOSCOPY;  Surgeon: Rufino Cohen MD;  Location:  [facility] OR;  Service: Gastroenterology;  Laterality: N/A;    ENDOSCOPY N/A 3/8/2024    Procedure: ESOPHAGOGASTRODUODENOSCOPY WITH ANESTHESIA;  Surgeon: Rufino Cohen MD;  Location:  [facility] OR;  Service: Gastroenterology;  Laterality: N/A;    ESOPHAGOSCOPY W/ DILATION      HYSTERECTOMY         Family History   Problem Relation Age of Onset    Hypertension Mother     Heart disease Father     Hypertension Father     Hypertension Sister     Heart attack Sister     Aneurysm Sister         brain    Drug abuse Sister     Heart attack Brother 48    Heart disease Brother     Hypertension Brother     Diabetes Brother     Breast cancer Paternal Cousin     Breast cancer Maternal Cousin        Social History     Socioeconomic History    Marital status:    Tobacco Use    Smoking status: Former     Current packs/day: 0.00     Types: Electronic Cigarette, Cigarettes     Quit date: 2014     Years since quitting: 10.6     Passive exposure: Past    Smokeless tobacco: Never   Vaping Use    Vaping status: Every Day    Substances: Nicotine   Substance and Sexual Activity    Alcohol use: No    Drug use: No    Sexual activity: Defer         Current Outpatient Medications:     Accu-Chek Guide test strip, USE 1 STRIP TO CHECK GLUCOSE 4 TIMES DAILY AS NEEDED, Disp: , Rfl:     Accu-Chek Softclix Lancets lancets, USE 1 LANCET TO CHECK GLUCOSE TWICE DAILY, Disp: , Rfl:     apixaban (ELIQUIS) 5 MG tablet tablet, Take 1 tablet by mouth 2 (Two) Times a  "Day., Disp: 60 tablet, Rfl: 5    atorvastatin (LIPITOR) 80 MG tablet, TAKE 1 TABLET DAILY, Disp: 90 tablet, Rfl: 3    Blood Glucose Monitoring Suppl (Accu-Chek Guide) w/Device kit, USE AS DIRECTED TWICE DAILY AS NEEDED, Disp: , Rfl:     FeroSul 325 (65 Fe) MG tablet, Take 1 tablet by mouth Daily., Disp: , Rfl:     flecainide (TAMBOCOR) 100 MG tablet, Take 1 tablet by mouth 2 (Two) Times a Day., Disp: 60 tablet, Rfl: 5    isosorbide mononitrate (IMDUR) 60 MG 24 hr tablet, Take 1 tablet by mouth Daily., Disp: 30 tablet, Rfl: 5    losartan-hydrochlorothiazide (HYZAAR) 100-12.5 MG per tablet, Take 0.5 tablets by mouth Daily., Disp: , Rfl:     meloxicam (MOBIC) 15 MG tablet, Take 1 tablet by mouth Daily., Disp: , Rfl:     nitroglycerin (NITROSTAT) 0.4 MG SL tablet, 1 under the tongue as needed for angina, may repeat q5mins for up three doses, Disp: 25 tablet, Rfl: 2    pantoprazole (PROTONIX) 40 MG EC tablet, Take 1 tablet by mouth Daily., Disp: , Rfl:     metoprolol tartrate (LOPRESSOR) 25 MG tablet, Take 1 tablet by mouth 2 (Two) Times a Day., Disp: , Rfl:     Allergies:   No Known Allergies    Objective   Vital Signs: Blood pressure 154/90, pulse 74, height 167.6 cm (66\"), weight 75.6 kg (166 lb 9.6 oz), SpO2 94%.    PHYSICAL EXAM  General appearance: Awake, alert, cooperative  Head: Normocephalic, without obvious abnormality, atraumatic  Lungs: Clear to ascultation bilaterally  Heart: Regular rate and rhythm, no murmurs, no lower extremity swelling  Skin: Skin color, turgor normal, no rashes or lesions  Neurologic: Grossly normal     Lab Results   Component Value Date    GLUCOSE 95 03/21/2024    CALCIUM 9.3 03/21/2024     (H) 03/21/2024    K 3.4 (L) 03/21/2024    CO2 28.0 03/21/2024     (H) 03/21/2024    BUN 10 03/21/2024    CREATININE 0.80 03/21/2024    EGFRIFNONA 75 07/24/2020    BCR 12.5 03/21/2024    ANIONGAP 10.0 03/21/2024     Lab Results   Component Value Date    WBC 7.59 03/21/2024    HGB 9.5 (L) " 03/21/2024    HCT 33.7 (L) 03/21/2024    MCV 81.8 03/21/2024     (H) 03/21/2024     Lab Results   Component Value Date    INR 0.94 04/24/2023    INR 1.04 11/01/2022    INR 1.05 10/19/2022    PROTIME 13.1 04/24/2023    PROTIME 13.9 11/01/2022    PROTIME 13.9 10/19/2022     Lab Results   Component Value Date    TSH 1.390 03/21/2024          Results for orders placed during the hospital encounter of 10/18/22    Adult Transthoracic Echo Complete w/ Color, Spectral and Contrast if necessary per protocol    Interpretation Summary    Left ventricular ejection fraction appears to be 51 - 55%.    Left ventricular wall thickness is consistent with mild to moderate septal asymmetric hypertrophy.    Left ventricular diastolic function is consistent with (grade I) impaired relaxation.    Estimated right ventricular systolic pressure from tricuspid regurgitation is normal (<35 mmHg).     Results for orders placed during the hospital encounter of 10/18/22    Cardiac Catheterization/Vascular Study    Conclusion    Moderate CAD noted worse seen in the [facility] recertification      I personally viewed and interpreted the patient's EKG/Telemetry/lab data      ECG 12 Lead    Date/Time: 9/6/2024 5:02 PM  Performed by: Damion Day PA-C    Authorized by: Damion Day PA-C  Comparison: compared with previous ECG from 4/12/2024  Comparison to previous ECG: Interval worsening of lateral ST abnormality  Rhythm: sinus rhythm  Rate: normal  BPM: 74  Other findings: T wave abnormality    Clinical impression: abnormal EKG          Ivis Vela  reports that she quit smoking about 10 years ago. Her smoking use included electronic cigarette and cigarettes. She has been exposed to tobacco smoke. She has never used smokeless tobacco.        Advance Care Planning   Advance Care Planning: ACP discussion was declined by the patient. Patient does not have an advance directive, declines further assistance.     Assessment & Plan     1. Paroxysmal atrial fibrillation  No documented recurrence on flecainide 100 mg twice daily, the patient is experiencing palpitations that are predict are from PACs and PVCs rather than atrial fibrillation.  We will investigate further with Holter monitor.    2. PVC's (premature ventricular contractions)  On flecainide.  Patient with complaints of palpitations.  We will document Holter monitor.    3. Premature atrial contractions  Same as above    4. Long term current use of antiarrhythmic medical therapy  QRS acceptable on EKG today.  Continue flecainide for PVC and atrial fibrillation suppression.    The patient has chronic ST abnormality in lateral leads especially V5 and V6, but they do appear to be somewhat worse on today's EKG.  I recommended the patient discuss this with her primary cardiology team mainly GAYLE Haskins.  It is likely nonspecific as she has no anginal complaints at this time    Follow Up:  Return in about 6 months (around 3/6/2025).      Thank you for allowing me to participate in the care of your patient. Please do not hesitate to contact me with additional questions or concerns.      Damion Day PA-C  Cardiac Electrophysiology  [facility] / [facility]

## 2024-09-09 ENCOUNTER — OFFICE VISIT (OUTPATIENT)
Dept: CARDIOLOGY | Facility: CLINIC | Age: 69
End: 2024-09-09
Payer: MEDICARE

## 2024-09-09 VITALS
SYSTOLIC BLOOD PRESSURE: 148 MMHG | DIASTOLIC BLOOD PRESSURE: 82 MMHG | BODY MASS INDEX: 26.68 KG/M2 | HEIGHT: 66 IN | HEART RATE: 79 BPM | OXYGEN SATURATION: 98 % | WEIGHT: 166 LBS

## 2024-09-09 DIAGNOSIS — I48.0 PAROXYSMAL ATRIAL FIBRILLATION: Primary | ICD-10-CM

## 2024-09-09 DIAGNOSIS — I10 ESSENTIAL HYPERTENSION: ICD-10-CM

## 2024-09-09 DIAGNOSIS — I25.10 ASCVD (ARTERIOSCLEROTIC CARDIOVASCULAR DISEASE): ICD-10-CM

## 2024-09-09 DIAGNOSIS — R07.2 PRECORDIAL PAIN: ICD-10-CM

## 2024-09-09 PROCEDURE — 99214 OFFICE O/P EST MOD 30 MIN: CPT | Performed by: NURSE PRACTITIONER

## 2024-09-09 PROCEDURE — 1160F RVW MEDS BY RX/DR IN RCRD: CPT | Performed by: NURSE PRACTITIONER

## 2024-09-09 PROCEDURE — 3077F SYST BP >= 140 MM HG: CPT | Performed by: NURSE PRACTITIONER

## 2024-09-09 PROCEDURE — 3079F DIAST BP 80-89 MM HG: CPT | Performed by: NURSE PRACTITIONER

## 2024-09-09 PROCEDURE — 1159F MED LIST DOCD IN RCRD: CPT | Performed by: NURSE PRACTITIONER

## 2024-09-09 NOTE — LETTER
September 9, 2024     Lawrence Faulkner DO  [address] [place] [address] Drive  [address]  [place] [id number]    Patient: Ivis Vela   YOB: 1955   Date of Visit: 9/9/2024     Dear Lawrence Faulkner DO:       Thank you for referring Ivis Vela to me for evaluation. Below are the relevant portions of my assessment and plan of care.    If you have questions, please do not hesitate to call me. I look forward to following Ivis along with you.         Sincerely,        GAYLE Haskins        CC: No Recipients    Baylee Chaudhary APRN  09/09/24 1042  Sign when Signing Visit  Lawrence Faulkner DO  Ivis Vela  1955 09/09/2024    Patient Active Problem List   Diagnosis   • Hypertension   • Asthma   • Osteoporosis   • Left arm pain   • Closed nondisplaced fracture of head of radius with routine healing   • Palpitations   • Dizziness and giddiness   • Unstable angina   • Paroxysmal atrial fibrillation   • PVC's (premature ventricular contractions)   • Premature atrial contractions   • VT (ventricular tachycardia)   • Anemia   • Long term current use of antiarrhythmic medical therapy       Dear Lawrence Faulkner DO:    Subjective    Chief Complaint   Patient presents with   • Follow-up         History of Present Illness:    Ivis Vela is a 69 y.o. female with a past medical history of paroxysmal atrial fibrillation on Eliquis, nonobstructive CAD, PVCs, PACs, paroxysmal atrial fibrillation, and iron deficiency anemia.  She presents today for cardiology follow-up.  She continues to have intermittent palpitations, although overall improved.  Was evaluated by EP on 9/6/2024.  Holter monitor has been ordered.  She has occasional chest pressure she thinks may be from her cervical spine and shoulder.  She is following with pain management but has to have additional imaging due to worsening of symptoms.  Her last cardiac cath was in 2022 and revealed moderate CAD which was nonobstructive.  She did have recent EKG at  EP office which she brought for me to review today.  This shows normal sinus rhythm with nonspecific ST/T wave changes, similar to EKG prior.  She also reports she has had issues with her blood pressure.  On occasion, has episodes of hypotension with systolic blood pressure in the 80s.  When this happens she feels very lightheaded and has near syncopal episodes.  Otherwise, blood pressure is typically above goal.          No Known Allergies:      Current Outpatient Medications:   •  Accu-Chek Guide test strip, USE 1 STRIP TO CHECK GLUCOSE 4 TIMES DAILY AS NEEDED, Disp: , Rfl:   •  Accu-Chek Softclix Lancets lancets, USE 1 LANCET TO CHECK GLUCOSE TWICE DAILY, Disp: , Rfl:   •  apixaban (ELIQUIS) 5 MG tablet tablet, Take 1 tablet by mouth 2 (Two) Times a Day., Disp: 60 tablet, Rfl: 5  •  atorvastatin (LIPITOR) 80 MG tablet, TAKE 1 TABLET DAILY, Disp: 90 tablet, Rfl: 3  •  Blood Glucose Monitoring Suppl (Accu-Chek Guide) w/Device kit, USE AS DIRECTED TWICE DAILY AS NEEDED, Disp: , Rfl:   •  FeroSul 325 (65 Fe) MG tablet, Take 1 tablet by mouth Daily., Disp: , Rfl:   •  flecainide (TAMBOCOR) 100 MG tablet, Take 1 tablet by mouth 2 (Two) Times a Day., Disp: 60 tablet, Rfl: 5  •  isosorbide mononitrate (IMDUR) 60 MG 24 hr tablet, Take 1 tablet by mouth Daily., Disp: 30 tablet, Rfl: 5  •  losartan-hydrochlorothiazide (HYZAAR) 100-12.5 MG per tablet, Take 0.5 tablets by mouth Daily., Disp: , Rfl:   •  meloxicam (MOBIC) 15 MG tablet, Take 1 tablet by mouth Daily., Disp: , Rfl:   •  metoprolol tartrate (LOPRESSOR) 25 MG tablet, Take 1 tablet by mouth 2 (Two) Times a Day., Disp: , Rfl:   •  nitroglycerin (NITROSTAT) 0.4 MG SL tablet, 1 under the tongue as needed for angina, may repeat q5mins for up three doses, Disp: 25 tablet, Rfl: 2  •  pantoprazole (PROTONIX) 40 MG EC tablet, Take 1 tablet by mouth Daily., Disp: , Rfl:       The following portions of the patient's history were reviewed and updated as appropriate: allergies,  "current medications, past family history, past medical history, past social history, past surgical history and problem list.    Social History     Tobacco Use   • Smoking status: Former     Current packs/day: 0.00     Types: Electronic Cigarette, Cigarettes     Quit date: 2014     Years since quitting: 10.6     Passive exposure: Past   • Smokeless tobacco: Never   Vaping Use   • Vaping status: Every Day   • Substances: Nicotine   Substance Use Topics   • Alcohol use: No   • Drug use: No       Review of Systems   Constitutional: Negative for decreased appetite and malaise/fatigue.   Cardiovascular:  Positive for chest pain. Negative for dyspnea on exertion and palpitations.   Respiratory:  Negative for cough and shortness of breath.        Objective  Vitals:    09/09/24 1003 09/09/24 1005   BP: 171/81 148/82   Pulse: 79    SpO2: 98%    Weight: 75.3 kg (166 lb)    Height: 167.6 cm (66\")      Body mass index is 26.79 kg/m².        Vitals reviewed.   Constitutional:       Appearance: Healthy appearance. Well-developed and not in distress.   HENT:      Head: Normocephalic and atraumatic.   Neck:      Vascular: No carotid bruit or JVD.   Pulmonary:      Effort: Pulmonary effort is normal.      Breath sounds: Normal breath sounds. No wheezing. No rales.   Cardiovascular:      Normal rate. Regular rhythm.      Murmurs: There is no murmur.      . No S3 and S4 gallop.   Edema:     Peripheral edema absent.   Abdominal:      General: Bowel sounds are normal.      Palpations: Abdomen is soft.   Skin:     General: Skin is warm and dry.   Neurological:      Mental Status: Alert, oriented to person, place, and time and oriented to person, place and time.   Psychiatric:         Mood and Affect: Mood normal.         Behavior: Behavior normal.         Lab Results   Component Value Date     (H) 03/21/2024    K 3.4 (L) 03/21/2024     (H) 03/21/2024    CO2 28.0 03/21/2024    BUN 10 03/21/2024    CREATININE 0.80 03/21/2024    " GLUCOSE 95 03/21/2024    CALCIUM 9.3 03/21/2024    AST 14 03/06/2024    ALT 15 03/06/2024    ALKPHOS 78 03/06/2024     Lab Results   Component Value Date    WBC 7.59 03/21/2024    HGB 9.5 (L) 03/21/2024    HCT 33.7 (L) 03/21/2024     (H) 03/21/2024     Lab Results   Component Value Date    TSH 1.390 03/21/2024    TRIG 96 10/18/2022    HDL 55 10/18/2022    LDL 85 10/18/2022          Results for orders placed during the hospital encounter of 10/18/22    Adult Transthoracic Echo Complete w/ Color, Spectral and Contrast if necessary per protocol    Interpretation Summary  •  Left ventricular ejection fraction appears to be 51 - 55%.  •  Left ventricular wall thickness is consistent with mild to moderate septal asymmetric hypertrophy.  •  Left ventricular diastolic function is consistent with (grade I) impaired relaxation.  •  Estimated right ventricular systolic pressure from tricuspid regurgitation is normal (<35 mmHg).     Results for orders placed during the hospital encounter of 08/26/22    Stress Test With Myocardial Perfusion One Day    Interpretation Summary  · A pharmacological stress test was performed using regadenoson without low-level exercise.  · Findings consistent with a normal ECG stress test.  · Myocardial perfusion imaging indicates a normal myocardial perfusion study with no evidence of ischemia.  · Normal LV cavity size. Normal LV wall motion noted.  · Left ventricular ejection fraction is hyperdynamic (Calculated EF > 70%). .  · Impressions are consistent with a low risk study.       Results for orders placed during the hospital encounter of 10/18/22    Cardiac Catheterization/Vascular Study    Conclusion  •  Moderate CAD noted worse seen in the [facility] recertification        Procedures    Assessment & Plan   Diagnosis Plan   1. Paroxysmal atrial fibrillation        2. ASCVD (arteriosclerotic cardiovascular disease)  Stress Test With Myocardial Perfusion One Day      3. Precordial  pain  Stress Test With Myocardial Perfusion One Day      4. Essential hypertension                 Recommendations:  Paroxysmal atrial fibrillation-continue Eliquis, flecainide, and metoprolol.  She is following with EP and will be wearing Holter monitor soon.  ASCVD-nonobstructive on cardiac cath in 2022 but with recent anginal symptoms.  Will order Lexiscan stress test to rule out any evidence of myocardial ischemia.  Hypertension-BP has been fluctuating.  She is going to take her blood pressure every morning before taking her medications.  If SBP is less than 110 mmHg, she will hold Hyzaar.  Follow-up in 6 weeks or sooner if needed.      Return in about 6 weeks (around 10/21/2024) for Recheck.    As always, I appreciate very much the opportunity to participate in the cardiovascular care of your patients.      With Best Regards,    GAYLE Haskins

## 2024-09-09 NOTE — PROGRESS NOTES
Lawrence Faulkner DO  Ivis Vela  1955 09/09/2024    Patient Active Problem List   Diagnosis    Hypertension    Asthma    Osteoporosis    Left arm pain    Closed nondisplaced fracture of head of radius with routine healing    Palpitations    Dizziness and giddiness    Unstable angina    Paroxysmal atrial fibrillation    PVC's (premature ventricular contractions)    Premature atrial contractions    VT (ventricular tachycardia)    Anemia    Long term current use of antiarrhythmic medical therapy       Dear Lawrence Faulkner DO:    Subjective     Chief Complaint   Patient presents with    Follow-up         History of Present Illness:    Ivis Vela is a 69 y.o. female with a past medical history of paroxysmal atrial fibrillation on Eliquis, nonobstructive CAD, PVCs, PACs, paroxysmal atrial fibrillation, and iron deficiency anemia.  She presents today for cardiology follow-up.  She continues to have intermittent palpitations, although overall improved.  Was evaluated by EP on 9/6/2024.  Holter monitor has been ordered.  She has occasional chest pressure she thinks may be from her cervical spine and shoulder.  She is following with pain management but has to have additional imaging due to worsening of symptoms.  Her last cardiac cath was in 2022 and revealed moderate CAD which was nonobstructive.  She did have recent EKG at EP office which she brought for me to review today.  This shows normal sinus rhythm with nonspecific ST/T wave changes, similar to EKG prior.  She also reports she has had issues with her blood pressure.  On occasion, has episodes of hypotension with systolic blood pressure in the 80s.  When this happens she feels very lightheaded and has near syncopal episodes.  Otherwise, blood pressure is typically above goal.          No Known Allergies:      Current Outpatient Medications:     Accu-Chek Guide test strip, USE 1 STRIP TO CHECK GLUCOSE 4 TIMES DAILY AS NEEDED, Disp: , Rfl:     Accu-Chek  Softclix Lancets lancets, USE 1 LANCET TO CHECK GLUCOSE TWICE DAILY, Disp: , Rfl:     apixaban (ELIQUIS) 5 MG tablet tablet, Take 1 tablet by mouth 2 (Two) Times a Day., Disp: 60 tablet, Rfl: 5    atorvastatin (LIPITOR) 80 MG tablet, TAKE 1 TABLET DAILY, Disp: 90 tablet, Rfl: 3    Blood Glucose Monitoring Suppl (Accu-Chek Guide) w/Device kit, USE AS DIRECTED TWICE DAILY AS NEEDED, Disp: , Rfl:     FeroSul 325 (65 Fe) MG tablet, Take 1 tablet by mouth Daily., Disp: , Rfl:     flecainide (TAMBOCOR) 100 MG tablet, Take 1 tablet by mouth 2 (Two) Times a Day., Disp: 60 tablet, Rfl: 5    isosorbide mononitrate (IMDUR) 60 MG 24 hr tablet, Take 1 tablet by mouth Daily., Disp: 30 tablet, Rfl: 5    losartan-hydrochlorothiazide (HYZAAR) 100-12.5 MG per tablet, Take 0.5 tablets by mouth Daily., Disp: , Rfl:     meloxicam (MOBIC) 15 MG tablet, Take 1 tablet by mouth Daily., Disp: , Rfl:     metoprolol tartrate (LOPRESSOR) 25 MG tablet, Take 1 tablet by mouth 2 (Two) Times a Day., Disp: , Rfl:     nitroglycerin (NITROSTAT) 0.4 MG SL tablet, 1 under the tongue as needed for angina, may repeat q5mins for up three doses, Disp: 25 tablet, Rfl: 2    pantoprazole (PROTONIX) 40 MG EC tablet, Take 1 tablet by mouth Daily., Disp: , Rfl:       The following portions of the patient's history were reviewed and updated as appropriate: allergies, current medications, past family history, past medical history, past social history, past surgical history and problem list.    Social History     Tobacco Use    Smoking status: Former     Current packs/day: 0.00     Types: Electronic Cigarette, Cigarettes     Quit date: 2014     Years since quitting: 10.6     Passive exposure: Past    Smokeless tobacco: Never   Vaping Use    Vaping status: Every Day    Substances: Nicotine   Substance Use Topics    Alcohol use: No    Drug use: No       Review of Systems   Constitutional: Negative for decreased appetite and malaise/fatigue.   Cardiovascular:  Positive  "for chest pain. Negative for dyspnea on exertion and palpitations.   Respiratory:  Negative for cough and shortness of breath.        Objective   Vitals:    09/09/24 1003 09/09/24 1005   BP: 171/81 148/82   Pulse: 79    SpO2: 98%    Weight: 75.3 kg (166 lb)    Height: 167.6 cm (66\")      Body mass index is 26.79 kg/m².        Vitals reviewed.   Constitutional:       Appearance: Healthy appearance. Well-developed and not in distress.   HENT:      Head: Normocephalic and atraumatic.   Neck:      Vascular: No carotid bruit or JVD.   Pulmonary:      Effort: Pulmonary effort is normal.      Breath sounds: Normal breath sounds. No wheezing. No rales.   Cardiovascular:      Normal rate. Regular rhythm.      Murmurs: There is no murmur.      . No S3 and S4 gallop.   Edema:     Peripheral edema absent.   Abdominal:      General: Bowel sounds are normal.      Palpations: Abdomen is soft.   Skin:     General: Skin is warm and dry.   Neurological:      Mental Status: Alert, oriented to person, place, and time and oriented to person, place and time.   Psychiatric:         Mood and Affect: Mood normal.         Behavior: Behavior normal.         Lab Results   Component Value Date     (H) 03/21/2024    K 3.4 (L) 03/21/2024     (H) 03/21/2024    CO2 28.0 03/21/2024    BUN 10 03/21/2024    CREATININE 0.80 03/21/2024    GLUCOSE 95 03/21/2024    CALCIUM 9.3 03/21/2024    AST 14 03/06/2024    ALT 15 03/06/2024    ALKPHOS 78 03/06/2024     Lab Results   Component Value Date    WBC 7.59 03/21/2024    HGB 9.5 (L) 03/21/2024    HCT 33.7 (L) 03/21/2024     (H) 03/21/2024     Lab Results   Component Value Date    TSH 1.390 03/21/2024    TRIG 96 10/18/2022    HDL 55 10/18/2022    LDL 85 10/18/2022          Results for orders placed during the hospital encounter of 10/18/22    Adult Transthoracic Echo Complete w/ Color, Spectral and Contrast if necessary per protocol    Interpretation Summary    Left ventricular ejection " fraction appears to be 51 - 55%.    Left ventricular wall thickness is consistent with mild to moderate septal asymmetric hypertrophy.    Left ventricular diastolic function is consistent with (grade I) impaired relaxation.    Estimated right ventricular systolic pressure from tricuspid regurgitation is normal (<35 mmHg).     Results for orders placed during the hospital encounter of 08/26/22    Stress Test With Myocardial Perfusion One Day    Interpretation Summary  · A pharmacological stress test was performed using regadenoson without low-level exercise.  · Findings consistent with a normal ECG stress test.  · Myocardial perfusion imaging indicates a normal myocardial perfusion study with no evidence of ischemia.  · Normal LV cavity size. Normal LV wall motion noted.  · Left ventricular ejection fraction is hyperdynamic (Calculated EF > 70%). .  · Impressions are consistent with a low risk study.       Results for orders placed during the hospital encounter of 10/18/22    Cardiac Catheterization/Vascular Study    Conclusion    [facility]        Procedures    Assessment & Plan    Diagnosis Plan   1. Paroxysmal atrial fibrillation        2. ASCVD (arteriosclerotic cardiovascular disease)  Stress Test With Myocardial Perfusion One Day      3. Precordial pain  Stress Test With Myocardial Perfusion One Day      4. Essential hypertension                 Recommendations:  Paroxysmal atrial fibrillation-continue Eliquis, flecainide, and metoprolol.  She is following with EP and will be wearing Holter monitor soon.  ASCVD-nonobstructive on cardiac cath in 2022 but with recent anginal symptoms.  Will order Lexiscan stress test to rule out any evidence of myocardial ischemia.  Hypertension-BP has been fluctuating.  She is going to take her blood pressure every morning before taking her medications.  If SBP is less than 110 mmHg, she will hold Hyzaar.  Follow-up in 6  weeks or sooner if needed.      Return in about 6 weeks (around 10/21/2024) for Recheck.    As always, I appreciate very much the opportunity to participate in the cardiovascular care of your patients.      With Best Regards,    GAYLE Haskins

## 2024-09-17 DIAGNOSIS — I48.0 PAROXYSMAL ATRIAL FIBRILLATION: ICD-10-CM

## 2024-09-17 RX ORDER — FLECAINIDE ACETATE 100 MG/1
100 TABLET ORAL 2 TIMES DAILY
Qty: 60 TABLET | Refills: 5 | Status: SHIPPED | OUTPATIENT
Start: 2024-09-17

## 2024-09-19 ENCOUNTER — TELEPHONE (OUTPATIENT)
Dept: CARDIOLOGY | Facility: CLINIC | Age: 69
End: 2024-09-19

## 2024-09-19 NOTE — TELEPHONE ENCOUNTER
Pt states the monitor had an error when she put it on and she contacted customer service and they instructed her to send it back and she received another monitor. She is currently wearing this monitor and today is day 7  so she will be putting it in the mail today or tomorrow.     [employer]-can you make sure the one with no data is not associated to this order so that way when the new result comes back it is attached to the order appropriately and there is no delay with it getting read. Thanks.

## 2024-09-25 ENCOUNTER — TELEPHONE (OUTPATIENT)
Dept: CARDIOLOGY | Facility: CLINIC | Age: 69
End: 2024-09-25
Payer: MEDICARE

## 2024-09-30 ENCOUNTER — TELEPHONE (OUTPATIENT)
Dept: CARDIOLOGY | Facility: CLINIC | Age: 69
End: 2024-09-30
Payer: MEDICARE

## 2024-09-30 NOTE — TELEPHONE ENCOUNTER
Hub to relay.    Called pt and advised her that I will ask  and see if they can do the 3 days but our recommendations are no more than 2. She expressed understanding.

## 2024-09-30 NOTE — TELEPHONE ENCOUNTER
Spoke with  and he stated it was okay to hold for 3 days. Called pt and advised her I have resent her risk assessment form.

## 2024-09-30 NOTE — TELEPHONE ENCOUNTER
Caller: Ivis Vela    Relationship: Self    Best call back number: [phone]    What is the best time to reach you: ANY    What was the call regarding: PATIENT ADVISING THE CLEARANCE SENT TO [facility] WAS FOR 2 DAYS OFF OF THE ELIQUIS AND IT IS NEEDED FOR 3 DAYS OFF OF THE ELIQUIS. PLEASE RESEND CLEARANCE ASAP. CALL PATIENT WITH ANY QUESTIONS.     Is it okay if the provider responds through MyChart: NO

## 2024-10-30 ENCOUNTER — HOSPITAL ENCOUNTER (OUTPATIENT)
Dept: NUCLEAR MEDICINE | Facility: HOSPITAL | Age: 69
Discharge: HOME | End: 2024-10-30
Payer: MEDICARE

## 2024-10-30 ENCOUNTER — HOSPITAL ENCOUNTER (OUTPATIENT)
Dept: CARDIOLOGY | Facility: HOSPITAL | Age: 69
Discharge: HOME | End: 2024-10-30
Payer: MEDICARE

## 2024-10-30 DIAGNOSIS — R07.2 PRECORDIAL PAIN: ICD-10-CM

## 2024-10-30 DIAGNOSIS — I25.10 ASCVD (ARTERIOSCLEROTIC CARDIOVASCULAR DISEASE): ICD-10-CM

## 2024-10-30 LAB
BH CV NUCLEAR PRIOR STUDY: 3
BH CV REST NUCLEAR ISOTOPE DOSE: 8.2 MCI
BH CV STRESS BP STAGE 1: NORMAL
BH CV STRESS COMMENTS STAGE 1: NORMAL
BH CV STRESS DOSE REGADENOSON STAGE 1: 0.4
BH CV STRESS DURATION MIN STAGE 1: 0
BH CV STRESS DURATION SEC STAGE 1: 10
BH CV STRESS HR STAGE 1: 79
BH CV STRESS NUCLEAR ISOTOPE DOSE: 24.2 MCI
BH CV STRESS PROTOCOL 1: NORMAL
BH CV STRESS RECOVERY BP: NORMAL MMHG
BH CV STRESS RECOVERY HR: 78 BPM
BH CV STRESS STAGE 1: 1
LV EF NUC BP: 69 %
MAXIMAL PREDICTED HEART RATE: 152 BPM
PERCENT MAX PREDICTED HR: 51.97 %
STRESS BASELINE BP: NORMAL MMHG
STRESS BASELINE HR: 67 BPM
STRESS PERCENT HR: 61 %
STRESS POST PEAK BP: NORMAL MMHG
STRESS POST PEAK HR: 79 BPM
STRESS TARGET HR: 129 BPM

## 2024-10-30 PROCEDURE — A9500 TC99M SESTAMIBI: HCPCS | Performed by: NURSE PRACTITIONER

## 2024-10-30 PROCEDURE — 0 TECHNETIUM SESTAMIBI: Performed by: NURSE PRACTITIONER

## 2024-10-30 PROCEDURE — 93017 CV STRESS TEST TRACING ONLY: CPT

## 2024-10-30 PROCEDURE — 25010000002 REGADENOSON 0.4 MG/5ML SOLUTION: Performed by: NURSE PRACTITIONER

## 2024-10-30 PROCEDURE — 78452 HT MUSCLE IMAGE SPECT MULT: CPT

## 2024-10-30 RX ORDER — REGADENOSON 0.08 MG/ML
0.4 INJECTION, SOLUTION INTRAVENOUS
Status: COMPLETED | OUTPATIENT
Start: 2024-10-30 | End: 2024-10-30

## 2024-10-30 RX ADMIN — REGADENOSON 0.4 MG: 0.08 INJECTION, SOLUTION INTRAVENOUS at 08:09

## 2024-10-30 RX ADMIN — TECHNETIUM TC 99M SESTAMIBI 1 DOSE: 1 INJECTION INTRAVENOUS at 07:00

## 2024-10-30 RX ADMIN — TECHNETIUM TC 99M SESTAMIBI 1 DOSE: 1 INJECTION INTRAVENOUS at 08:09

## 2024-11-04 ENCOUNTER — OFFICE VISIT (OUTPATIENT)
Dept: CARDIOLOGY | Facility: CLINIC | Age: 69
End: 2024-11-04
Payer: MEDICARE

## 2024-11-04 VITALS
HEIGHT: 66 IN | BODY MASS INDEX: 26.9 KG/M2 | WEIGHT: 167.4 LBS | OXYGEN SATURATION: 97 % | HEART RATE: 98 BPM | DIASTOLIC BLOOD PRESSURE: 72 MMHG | SYSTOLIC BLOOD PRESSURE: 136 MMHG

## 2024-11-04 DIAGNOSIS — I48.0 PAROXYSMAL ATRIAL FIBRILLATION: ICD-10-CM

## 2024-11-04 DIAGNOSIS — E78.5 DYSLIPIDEMIA: ICD-10-CM

## 2024-11-04 DIAGNOSIS — Z82.49 FAMILY HISTORY OF PREMATURE CORONARY ARTERY DISEASE: ICD-10-CM

## 2024-11-04 DIAGNOSIS — I10 ESSENTIAL HYPERTENSION: ICD-10-CM

## 2024-11-04 DIAGNOSIS — R94.39 ABNORMAL NUCLEAR STRESS TEST: ICD-10-CM

## 2024-11-04 DIAGNOSIS — I20.9 ANGINA, CLASS III: Primary | ICD-10-CM

## 2024-11-04 PROCEDURE — 99214 OFFICE O/P EST MOD 30 MIN: CPT | Performed by: INTERNAL MEDICINE

## 2024-11-04 PROCEDURE — 3075F SYST BP GE 130 - 139MM HG: CPT | Performed by: INTERNAL MEDICINE

## 2024-11-04 PROCEDURE — 3078F DIAST BP <80 MM HG: CPT | Performed by: INTERNAL MEDICINE

## 2024-11-04 RX ORDER — CLOPIDOGREL BISULFATE 75 MG/1
75 TABLET ORAL DAILY
Qty: 30 TABLET | Refills: 2 | Status: SHIPPED | OUTPATIENT
Start: 2024-11-04

## 2024-11-04 NOTE — LETTER
November 4, 2024     Lawrence Faulkner DO  [address] [place] [address] Drive  [address]  [place] [id number]    Patient: Ivis Vela   YOB: 1955   Date of Visit: 11/4/2024     Dear Dr. Faulkner:       Thank you for referring Ivis Vela to me for evaluation. Below are the relevant portions of my assessment and plan of care.    If you have questions, please do not hesitate to call me. I look forward to following Ivis along with you.         Sincerely,        Cristofer Arceo MD        CC: No Recipients    Cristofer Arceo MD  11/04/24 1437  Sign when Signing Visit  Lawrence Faulkner   Ivis Vela  1955 11/04/2024    Patient Active Problem List   Diagnosis   • Hypertension   • Asthma   • Osteoporosis   • Left arm pain   • Closed nondisplaced fracture of head of radius with routine healing   • Palpitations   • Dizziness and giddiness   • Unstable angina   • Paroxysmal atrial fibrillation   • PVC's (premature ventricular contractions)   • Premature atrial contractions   • VT (ventricular tachycardia)   • Anemia   • Long term current use of antiarrhythmic medical therapy       Dear Dr. Faulkner    Subjective     Ivis Vela is a 69 y.o. female with the problems as listed above, presents    Chief Complaint   Patient presents with   • Results     Abnormal stress        History of Present Illness: Ms. Ivis Vela is a pleasant 69-year-old  female with history of hypertension and a strong family history of premature coronary artery disease with 3 of her younger sisters having had CABG, has been having recent chest pains for which she underwent ischemic evaluation with a Lexiscan sestamibi study and is here to review the test results.  This revealed moderate size, moderate degree of anterolateral myocardial ischemia.      On further questioning she complains of having intermittent left-sided chest pains that seem to occur at random and felt as dull pains with radiation to the left arm and associated with  some shortness of breath.  These apparently have gotten more frequent recently in the last month or so.  She denies any significant dyspnea, PND, orthopnea or pedal edema.  She has previous history of smoking but quit about 8 years ago.  She also complains of having intermittent palpitations rapid heartbeat.  Her recent Holter monitor performed at [facility] by Dr. Alonso revealed apparently no significant arrhythmias.    Regadenoson Stress Test with Myocardial Perfusion SPECT (Multi Study)    Accession Number: [id number]   Date of Study: 10/30/24   Ordering Provider: Baylee Chaudhary APRN   Clinical Indications: Chest Pain        [facility]  Performing Staff   ECG [place], SPECT [place]: Cristofer Arceo MD    Tech: Williams Xie RN   Support Staff: Getachew Brannon         [facility] PACS Images     Show images for Stress Test With Myocardial Perfusion One Day   Interpretation Summary   •  A pharmacological stress test was performed using regadenoson without low-level exercise.  •  Findings consistent with an indeterminate ECG stress test.  •  Myocardial perfusion imaging indicates a moderate-sized, moderate degree of ischemia located in the anterior wall and lateral wall.  •  Normal LV cavity size. Normal LV wall motion noted.  •  Left ventricular ejection fraction is normal (Calculated EF = 69%).  •  Impressions are consistent with an intermediate to high risk study.           No Known Allergies:    Current Outpatient Medications:   •  Accu-Chek Guide test strip, USE 1 STRIP TO CHECK GLUCOSE 4 TIMES DAILY AS NEEDED, Disp: , Rfl:   •  Accu-Chek Softclix Lancets lancets, USE 1 LANCET TO CHECK GLUCOSE TWICE DAILY, Disp: , Rfl:   •  apixaban (ELIQUIS) 5 MG tablet tablet, Take 1 tablet by mouth 2 (Two) Times a Day., Disp: 60 tablet, Rfl: 5  •  atorvastatin (LIPITOR) 80 MG tablet, TAKE 1 TABLET DAILY, Disp: 90 tablet, Rfl: 3  •  Blood Glucose Monitoring Suppl (Accu-Chek Guide) w/Device kit, USE AS DIRECTED TWICE  DAILY AS NEEDED, Disp: , Rfl:   •  FeroSul 325 (65 Fe) MG tablet, Take 1 tablet by mouth Daily., Disp: , Rfl:   •  flecainide (TAMBOCOR) 100 MG tablet, Take 1 tablet by mouth 2 (Two) Times a Day., Disp: 60 tablet, Rfl: 5  •  isosorbide mononitrate (IMDUR) 60 MG 24 hr tablet, Take 1 tablet by mouth Daily., Disp: 30 tablet, Rfl: 5  •  losartan-hydrochlorothiazide (HYZAAR) 100-12.5 MG per tablet, Take 0.5 tablets by mouth Daily., Disp: , Rfl:   •  meloxicam (MOBIC) 15 MG tablet, Take 1 tablet by mouth Daily., Disp: , Rfl:   •  metoprolol tartrate (LOPRESSOR) 25 MG tablet, Take 1 tablet by mouth 2 (Two) Times a Day., Disp: , Rfl:   •  nitroglycerin (NITROSTAT) 0.4 MG SL tablet, 1 under the tongue as needed for angina, may repeat q5mins for up three doses, Disp: 25 tablet, Rfl: 2  •  pantoprazole (PROTONIX) 40 MG EC tablet, Take 1 tablet by mouth Daily., Disp: , Rfl:     The following portions of the patient's history were reviewed and updated as appropriate: allergies, current medications, past family history, past medical history, past social history, past surgical history and problem list.    Social History     Tobacco Use   • Smoking status: Former     Current packs/day: 0.00     Types: Electronic Cigarette, Cigarettes     Quit date: 2014     Years since quitting: 10.8     Passive exposure: Past   • Smokeless tobacco: Never   Vaping Use   • Vaping status: Every Day   • Substances: Nicotine   Substance Use Topics   • Alcohol use: No   • Drug use: No     Review of Systems   Constitutional: Negative for chills and fever.   HENT:  Negative for nosebleeds and sore throat.    Cardiovascular:  Positive for chest pain.   Respiratory:  Negative for cough, hemoptysis and wheezing.    Gastrointestinal:  Negative for abdominal pain, hematemesis, hematochezia, melena, nausea and vomiting.   Genitourinary:  Negative for dysuria and hematuria.   Neurological:  Negative for headaches.     Objective   Vitals:    11/04/24 1314   BP:  "136/72   Pulse: 98   SpO2: 97%   Weight: 75.9 kg (167 lb 6.4 oz)   Height: 167.6 cm (66\")     Body mass index is 27.02 kg/m².    Vitals reviewed.   Constitutional:       Appearance: Well-developed.   Eyes:      Conjunctiva/sclera: Conjunctivae normal.   HENT:      Head: Normocephalic.   Neck:      Thyroid: No thyromegaly.      Vascular: No JVD.      Trachea: No tracheal deviation.   Pulmonary:      Effort: No respiratory distress.      Breath sounds: Normal breath sounds. No wheezing. No rales.   Cardiovascular:      PMI at left midclavicular line. Normal rate. Regular rhythm. Normal S1. Normal S2.       Murmurs: There is no murmur.      No gallop.  No click. No rub.   Pulses:     Carotid: 2+ bilaterally.     Radial: 2+ bilaterally.     Dorsalis pedis: 2+ bilaterally.     Posterior tibial: 2+ bilaterally.  Edema:     Peripheral edema absent.   Abdominal:      General: Bowel sounds are normal.      Palpations: Abdomen is soft. There is no abdominal mass.      Tenderness: There is no abdominal tenderness.   Musculoskeletal:      Cervical back: Normal range of motion and neck supple. Skin:     General: Skin is warm and dry.   Neurological:      Mental Status: Alert and oriented to person, place, and time.      Cranial Nerves: No cranial nerve deficit.     Lab Results   Component Value Date     (H) 03/21/2024    K 3.4 (L) 03/21/2024     (H) 03/21/2024    CO2 28.0 03/21/2024    BUN 10 03/21/2024    CREATININE 0.80 03/21/2024    GLUCOSE 95 03/21/2024    CALCIUM 9.3 03/21/2024    AST 14 03/06/2024    ALT 15 03/06/2024    ALKPHOS 78 03/06/2024     No results found for: \"CKTOTAL\"  Lab Results   Component Value Date    WBC 7.59 03/21/2024    HGB 9.5 (L) 03/21/2024    HCT 33.7 (L) 03/21/2024     (H) 03/21/2024     Lab Results   Component Value Date    INR 0.94 04/24/2023    INR 1.04 11/01/2022    INR 1.05 10/19/2022     Lab Results   Component Value Date    MG 2.1 03/21/2024     Lab Results   Component Value " Date    TSH 1.390 03/21/2024    TRIG 96 10/18/2022    HDL 55 10/18/2022    LDL 85 10/18/2022        Assessment & Plan    Diagnosis Plan   1. Angina, class III        2. Abnormal nuclear stress test with moderate degree, moderate-sized anterolateral myocardial ischemia.        3. Paroxysmal atrial fibrillation with a [id number] score of 3.  Patient is on Eliquis for stroke prevention no major bleeding issues.        4. Family history of premature coronary artery disease        5. Essential hypertension        6. Dyslipidemia          Recommendations  I have discussed the results of the nuclear stress test with the patient and about the option of further cardiac evaluation with cardiac catheterization given the degree of ischemia and a strong family history of premature coronary artery disease.  Patient is agreeable.  I have discussed the procedure, potential risks, benefits and alternatives.  She expressed understanding of same and is wanting to proceed.  Will try to schedule this in the next week or so.  In the meantime will add clopidogrel 75 mg daily and continue with Eliquis for stroke prevention for now  Check fasting lipid panel.    No follow-ups on file.    As always, Dr. Faulkner  I appreciate very much the opportunity to participate in the cardiovascular care of your patients. Please do not hesitate to call me with any questions with regards to Ivis Vela's evaluation and management.       With Best Regards,        Cristofer Arceo MD, [facility]    Please note that portions of this note were completed with a voice recognition program.

## 2024-11-04 NOTE — H&P (VIEW-ONLY)
Lawrence Faulkner DO Ivis Vela  1955 11/04/2024    Patient Active Problem List   Diagnosis    Hypertension    Asthma    Osteoporosis    Left arm pain    Closed nondisplaced fracture of head of radius with routine healing    Palpitations    Dizziness and giddiness    Unstable angina    Paroxysmal atrial fibrillation    PVC's (premature ventricular contractions)    Premature atrial contractions    VT (ventricular tachycardia)    Anemia    Long term current use of antiarrhythmic medical therapy       Dear Dr. Faulkner    Nelson Vela is a 69 y.o. female with the problems as listed above, presents    Chief Complaint   Patient presents with    Results     Abnormal stress        History of Present Illness: Ms. Ivis Vela is a pleasant 69-year-old  female with history of hypertension and a strong family history of premature coronary artery disease with 3 of her younger sisters having had CABG, has been having recent chest pains for which she underwent ischemic evaluation with a Lexiscan sestamibi study and is here to review the test results.  This revealed moderate size, moderate degree of anterolateral myocardial ischemia.      On further questioning she complains of having intermittent left-sided chest pains that seem to occur at random and felt as dull pains with radiation to the left arm and associated with some shortness of breath.  These apparently have gotten more frequent recently in the last month or so.  She denies any significant dyspnea, PND, orthopnea or pedal edema.  She has previous history of smoking but quit about 8 years ago.  She also complains of having intermittent palpitations rapid heartbeat.  Her recent Holter monitor performed at [place] by Dr. Alonso revealed apparently no significant arrhythmias.    Regadenoson Stress Test with Myocardial Perfusion SPECT (Multi Study)    Accession Number: [id number]   Date of Study: 10/30/24   Ordering Provider: Neena  GAYLE Beach   Clinical Indications: Chest Pain        [place] [facility]  Performing Staff   ECG [place], SPECT [place]: Cristofer Arceo MD    Tech: Williams Xie RN   Support Staff: Getachew Brannon         [facility] PACS Images     Show images for Stress Test With Myocardial Perfusion One Day   Interpretation Summary     A pharmacological stress test was performed using regadenoson without low-level exercise.    Findings consistent with an indeterminate ECG stress test.    Myocardial perfusion imaging indicates a moderate-sized, moderate degree of ischemia located in the anterior wall and lateral wall.    Normal LV cavity size. Normal LV wall motion noted.    Left ventricular ejection fraction is normal (Calculated EF = 69%).    Impressions are consistent with an intermediate to high risk study.           No Known Allergies:    Current Outpatient Medications:     Accu-Chek Guide test strip, USE 1 STRIP TO CHECK GLUCOSE 4 TIMES DAILY AS NEEDED, Disp: , Rfl:     Accu-Chek Softclix Lancets lancets, USE 1 LANCET TO CHECK GLUCOSE TWICE DAILY, Disp: , Rfl:     apixaban (ELIQUIS) 5 MG tablet tablet, Take 1 tablet by mouth 2 (Two) Times a Day., Disp: 60 tablet, Rfl: 5    atorvastatin (LIPITOR) 80 MG tablet, TAKE 1 TABLET DAILY, Disp: 90 tablet, Rfl: 3    Blood Glucose Monitoring Suppl (Accu-Chek Guide) w/Device kit, USE AS DIRECTED TWICE DAILY AS NEEDED, Disp: , Rfl:     FeroSul 325 (65 Fe) MG tablet, Take 1 tablet by mouth Daily., Disp: , Rfl:     flecainide (TAMBOCOR) 100 MG tablet, Take 1 tablet by mouth 2 (Two) Times a Day., Disp: 60 tablet, Rfl: 5    isosorbide mononitrate (IMDUR) 60 MG 24 hr tablet, Take 1 tablet by mouth Daily., Disp: 30 tablet, Rfl: 5    losartan-hydrochlorothiazide (HYZAAR) 100-12.5 MG per tablet, Take 0.5 tablets by mouth Daily., Disp: , Rfl:     meloxicam (MOBIC) 15 MG tablet, Take 1 tablet by mouth Daily., Disp: , Rfl:     metoprolol tartrate (LOPRESSOR) 25 MG tablet, Take 1 tablet by mouth 2  "(Two) Times a Day., Disp: , Rfl:     nitroglycerin (NITROSTAT) 0.4 MG SL tablet, 1 under the tongue as needed for angina, may repeat q5mins for up three doses, Disp: 25 tablet, Rfl: 2    pantoprazole (PROTONIX) 40 MG EC tablet, Take 1 tablet by mouth Daily., Disp: , Rfl:     The following portions of the patient's history were reviewed and updated as appropriate: allergies, current medications, past family history, past medical history, past social history, past surgical history and problem list.    Social History     Tobacco Use    Smoking status: Former     Current packs/day: 0.00     Types: Electronic Cigarette, Cigarettes     Quit date: 2014     Years since quitting: 10.8     Passive exposure: Past    Smokeless tobacco: Never   Vaping Use    Vaping status: Every Day    Substances: Nicotine   Substance Use Topics    Alcohol use: No    Drug use: No     Review of Systems   Constitutional: Negative for chills and fever.   HENT:  Negative for nosebleeds and sore throat.    Cardiovascular:  Positive for chest pain.   Respiratory:  Negative for cough, hemoptysis and wheezing.    Gastrointestinal:  Negative for abdominal pain, hematemesis, hematochezia, melena, nausea and vomiting.   Genitourinary:  Negative for dysuria and hematuria.   Neurological:  Negative for headaches.     Objective   Vitals:    11/04/24 1314   BP: 136/72   Pulse: 98   SpO2: 97%   Weight: 75.9 kg (167 lb 6.4 oz)   Height: 167.6 cm (66\")     Body mass index is 27.02 kg/m².    Vitals reviewed.   Constitutional:       Appearance: Well-developed.   Eyes:      Conjunctiva/sclera: Conjunctivae normal.   HENT:      Head: Normocephalic.   Neck:      Thyroid: No thyromegaly.      Vascular: No JVD.      Trachea: No tracheal deviation.   Pulmonary:      Effort: No respiratory distress.      Breath sounds: Normal breath sounds. No wheezing. No rales.   Cardiovascular:      PMI at left midclavicular line. Normal rate. Regular rhythm. Normal S1. Normal S2.      " " Murmurs: There is no murmur.      No gallop.  No click. No rub.   Pulses:     Carotid: 2+ bilaterally.     Radial: 2+ bilaterally.     Dorsalis pedis: 2+ bilaterally.     Posterior tibial: 2+ bilaterally.  Edema:     Peripheral edema absent.   Abdominal:      General: Bowel sounds are normal.      Palpations: Abdomen is soft. There is no abdominal mass.      Tenderness: There is no abdominal tenderness.   Musculoskeletal:      Cervical back: Normal range of motion and neck supple. Skin:     General: Skin is warm and dry.   Neurological:      Mental Status: Alert and oriented to person, place, and time.      Cranial Nerves: No cranial nerve deficit.     Lab Results   Component Value Date     (H) 03/21/2024    K 3.4 (L) 03/21/2024     (H) 03/21/2024    CO2 28.0 03/21/2024    BUN 10 03/21/2024    CREATININE 0.80 03/21/2024    GLUCOSE 95 03/21/2024    CALCIUM 9.3 03/21/2024    AST 14 03/06/2024    ALT 15 03/06/2024    ALKPHOS 78 03/06/2024     No results found for: \"CKTOTAL\"  Lab Results   Component Value Date    WBC 7.59 03/21/2024    HGB 9.5 (L) 03/21/2024    HCT 33.7 (L) 03/21/2024     (H) 03/21/2024     Lab Results   Component Value Date    INR 0.94 04/24/2023    INR 1.04 11/01/2022    INR 1.05 10/19/2022     Lab Results   Component Value Date    MG 2.1 03/21/2024     Lab Results   Component Value Date    TSH 1.390 03/21/2024    TRIG 96 10/18/2022    HDL 55 10/18/2022    LDL 85 10/18/2022        Assessment & Plan    Diagnosis Plan   1. Angina, class III        2. Abnormal nuclear stress test with moderate degree, moderate-sized anterolateral myocardial ischemia.        3. Paroxysmal atrial fibrillation with a [id number] score of 3.  Patient is on Eliquis for stroke prevention no major bleeding issues.        4. Family history of premature coronary artery disease        5. Essential hypertension        6. Dyslipidemia          Recommendations  I have discussed the results of the nuclear stress " test with the patient and about the option of further cardiac evaluation with cardiac catheterization given the degree of ischemia and a strong family history of premature coronary artery disease.  Patient is agreeable.  I have discussed the procedure, potential risks, benefits and alternatives.  She expressed understanding of same and is wanting to proceed.  Will try to schedule this in the next week or so.  In the meantime will add clopidogrel 75 mg daily and continue with Eliquis for stroke prevention for now  Check fasting lipid panel.    No follow-ups on file.    As always, Dr. Faulkner  I appreciate very much the opportunity to participate in the cardiovascular care of your patients. Please do not hesitate to call me with any questions with regards to Ivis Vela's evaluation and management.       With Best Regards,        Cristofer Arceo MD, [facility]    Please note that portions of this note were completed with a voice recognition program.

## 2024-11-04 NOTE — PROGRESS NOTES
Lawrence Faulkner DO Ivis Vela  1955 11/04/2024    Patient Active Problem List   Diagnosis    Hypertension    Asthma    Osteoporosis    Left arm pain    Closed nondisplaced fracture of head of radius with routine healing    Palpitations    Dizziness and giddiness    Unstable angina    Paroxysmal atrial fibrillation    PVC's (premature ventricular contractions)    Premature atrial contractions    VT (ventricular tachycardia)    Anemia    Long term current use of antiarrhythmic medical therapy       Dear Dr. Faulkner    Nelson Vela is a 69 y.o. female with the problems as listed above, presents    Chief Complaint   Patient presents with    Results     Abnormal stress        History of Present Illness: Ms. Ivis Vela is a pleasant 69-year-old  female with history of hypertension and a strong family history of premature coronary artery disease with 3 of her younger sisters having had CABG, has been having recent chest pains for which she underwent ischemic evaluation with a Lexiscan sestamibi study and is here to review the test results.  This revealed moderate size, moderate degree of anterolateral myocardial ischemia.      On further questioning she complains of having intermittent left-sided chest pains that seem to occur at random and felt as dull pains with radiation to the left arm and associated with some shortness of breath.  These apparently have gotten more frequent recently in the last month or so.  She denies any significant dyspnea, PND, orthopnea or pedal edema.  She has previous history of smoking but quit about 8 years ago.  She also complains of having intermittent palpitations rapid heartbeat.  Her recent Holter monitor performed at [place] by Dr. Alonso revealed apparently no significant arrhythmias.    Regadenoson Stress Test with Myocardial Perfusion SPECT (Multi Study)    Accession Number: [id number]   Date of Study: 10/30/24   Ordering Provider: Neena  GAYLE Beach   Clinical Indications: Chest Pain        [place] [facility]  Performing Staff   ECG [place], SPECT [place]: Cristofer Arceo MD    Tech: Williams Xie RN   Support Staff: Getachew Brannon         [facility] PACS Images     Show images for Stress Test With Myocardial Perfusion One Day   Interpretation Summary     A pharmacological stress test was performed using regadenoson without low-level exercise.    Findings consistent with an indeterminate ECG stress test.    Myocardial perfusion imaging indicates a moderate-sized, moderate degree of ischemia located in the anterior wall and lateral wall.    Normal LV cavity size. Normal LV wall motion noted.    Left ventricular ejection fraction is normal (Calculated EF = 69%).    Impressions are consistent with an intermediate to high risk study.           No Known Allergies:    Current Outpatient Medications:     Accu-Chek Guide test strip, USE 1 STRIP TO CHECK GLUCOSE 4 TIMES DAILY AS NEEDED, Disp: , Rfl:     Accu-Chek Softclix Lancets lancets, USE 1 LANCET TO CHECK GLUCOSE TWICE DAILY, Disp: , Rfl:     apixaban (ELIQUIS) 5 MG tablet tablet, Take 1 tablet by mouth 2 (Two) Times a Day., Disp: 60 tablet, Rfl: 5    atorvastatin (LIPITOR) 80 MG tablet, TAKE 1 TABLET DAILY, Disp: 90 tablet, Rfl: 3    Blood Glucose Monitoring Suppl (Accu-Chek Guide) w/Device kit, USE AS DIRECTED TWICE DAILY AS NEEDED, Disp: , Rfl:     FeroSul 325 (65 Fe) MG tablet, Take 1 tablet by mouth Daily., Disp: , Rfl:     flecainide (TAMBOCOR) 100 MG tablet, Take 1 tablet by mouth 2 (Two) Times a Day., Disp: 60 tablet, Rfl: 5    isosorbide mononitrate (IMDUR) 60 MG 24 hr tablet, Take 1 tablet by mouth Daily., Disp: 30 tablet, Rfl: 5    losartan-hydrochlorothiazide (HYZAAR) 100-12.5 MG per tablet, Take 0.5 tablets by mouth Daily., Disp: , Rfl:     meloxicam (MOBIC) 15 MG tablet, Take 1 tablet by mouth Daily., Disp: , Rfl:     metoprolol tartrate (LOPRESSOR) 25 MG tablet, Take 1 tablet by mouth 2  "(Two) Times a Day., Disp: , Rfl:     nitroglycerin (NITROSTAT) 0.4 MG SL tablet, 1 under the tongue as needed for angina, may repeat q5mins for up three doses, Disp: 25 tablet, Rfl: 2    pantoprazole (PROTONIX) 40 MG EC tablet, Take 1 tablet by mouth Daily., Disp: , Rfl:     The following portions of the patient's history were reviewed and updated as appropriate: allergies, current medications, past family history, past medical history, past social history, past surgical history and problem list.    Social History     Tobacco Use    Smoking status: Former     Current packs/day: 0.00     Types: Electronic Cigarette, Cigarettes     Quit date: 2014     Years since quitting: 10.8     Passive exposure: Past    Smokeless tobacco: Never   Vaping Use    Vaping status: Every Day    Substances: Nicotine   Substance Use Topics    Alcohol use: No    Drug use: No     Review of Systems   Constitutional: Negative for chills and fever.   HENT:  Negative for nosebleeds and sore throat.    Cardiovascular:  Positive for chest pain.   Respiratory:  Negative for cough, hemoptysis and wheezing.    Gastrointestinal:  Negative for abdominal pain, hematemesis, hematochezia, melena, nausea and vomiting.   Genitourinary:  Negative for dysuria and hematuria.   Neurological:  Negative for headaches.     Objective   Vitals:    11/04/24 1314   BP: 136/72   Pulse: 98   SpO2: 97%   Weight: 75.9 kg (167 lb 6.4 oz)   Height: 167.6 cm (66\")     Body mass index is 27.02 kg/m².    Vitals reviewed.   Constitutional:       Appearance: Well-developed.   Eyes:      Conjunctiva/sclera: Conjunctivae normal.   HENT:      Head: Normocephalic.   Neck:      Thyroid: No thyromegaly.      Vascular: No JVD.      Trachea: No tracheal deviation.   Pulmonary:      Effort: No respiratory distress.      Breath sounds: Normal breath sounds. No wheezing. No rales.   Cardiovascular:      PMI at left midclavicular line. Normal rate. Regular rhythm. Normal S1. Normal S2.      " " Murmurs: There is no murmur.      No gallop.  No click. No rub.   Pulses:     Carotid: 2+ bilaterally.     Radial: 2+ bilaterally.     Dorsalis pedis: 2+ bilaterally.     Posterior tibial: 2+ bilaterally.  Edema:     Peripheral edema absent.   Abdominal:      General: Bowel sounds are normal.      Palpations: Abdomen is soft. There is no abdominal mass.      Tenderness: There is no abdominal tenderness.   Musculoskeletal:      Cervical back: Normal range of motion and neck supple. Skin:     General: Skin is warm and dry.   Neurological:      Mental Status: Alert and oriented to person, place, and time.      Cranial Nerves: No cranial nerve deficit.     Lab Results   Component Value Date     (H) 03/21/2024    K 3.4 (L) 03/21/2024     (H) 03/21/2024    CO2 28.0 03/21/2024    BUN 10 03/21/2024    CREATININE 0.80 03/21/2024    GLUCOSE 95 03/21/2024    CALCIUM 9.3 03/21/2024    AST 14 03/06/2024    ALT 15 03/06/2024    ALKPHOS 78 03/06/2024     No results found for: \"CKTOTAL\"  Lab Results   Component Value Date    WBC 7.59 03/21/2024    HGB 9.5 (L) 03/21/2024    HCT 33.7 (L) 03/21/2024     (H) 03/21/2024     Lab Results   Component Value Date    INR 0.94 04/24/2023    INR 1.04 11/01/2022    INR 1.05 10/19/2022     Lab Results   Component Value Date    MG 2.1 03/21/2024     Lab Results   Component Value Date    TSH 1.390 03/21/2024    TRIG 96 10/18/2022    HDL 55 10/18/2022    LDL 85 10/18/2022        Assessment & Plan    Diagnosis Plan   1. Angina, class III        2. Abnormal nuclear stress test with moderate degree, moderate-sized anterolateral myocardial ischemia.        3. Paroxysmal atrial fibrillation with a [id number] score of 3.  Patient is on Eliquis for stroke prevention no major bleeding issues.        4. Family history of premature coronary artery disease        5. Essential hypertension        6. Dyslipidemia          Recommendations  I have discussed the results of the nuclear stress " test with the patient and about the option of further cardiac evaluation with cardiac catheterization given the degree of ischemia and a strong family history of premature coronary artery disease.  Patient is agreeable.  I have discussed the procedure, potential risks, benefits and alternatives.  She expressed understanding of same and is wanting to proceed.  Will try to schedule this in the next week or so.  In the meantime will add clopidogrel 75 mg daily and continue with Eliquis for stroke prevention for now  Check fasting lipid panel.    No follow-ups on file.    As always, Dr. Faulkner  I appreciate very much the opportunity to participate in the cardiovascular care of your patients. Please do not hesitate to call me with any questions with regards to Ivis Vela's evaluation and management.       With Best Regards,        Cristofer Arceo MD, [facility]    Please note that portions of this note were completed with a voice recognition program.

## 2024-11-05 DIAGNOSIS — R94.39 ABNORMAL NUCLEAR STRESS TEST: Primary | ICD-10-CM

## 2024-11-12 ENCOUNTER — HOSPITAL ENCOUNTER (OUTPATIENT)
Facility: HOSPITAL | Age: 69
Discharge: HOME | End: 2024-11-12
Attending: INTERNAL MEDICINE | Admitting: INTERNAL MEDICINE
Payer: MEDICARE

## 2024-11-12 VITALS
OXYGEN SATURATION: 96 % | DIASTOLIC BLOOD PRESSURE: 75 MMHG | WEIGHT: 159 LBS | BODY MASS INDEX: 25.55 KG/M2 | HEART RATE: 66 BPM | TEMPERATURE: 97.6 F | HEIGHT: 66 IN | SYSTOLIC BLOOD PRESSURE: 160 MMHG | RESPIRATION RATE: 18 BRPM

## 2024-11-12 DIAGNOSIS — R94.39 ABNORMAL NUCLEAR STRESS TEST: ICD-10-CM

## 2024-11-12 PROBLEM — Z98.890 STATUS POST CORONARY ANGIOGRAM: Status: ACTIVE | Noted: 2024-11-12

## 2024-11-12 LAB
ANION GAP SERPL CALCULATED.3IONS-SCNC: 9.6 MMOL/L (ref 5–15)
BUN SERPL-MCNC: 9 MG/DL (ref 8–23)
BUN/CREAT SERPL: 10.7 (ref 7–25)
CALCIUM SPEC-SCNC: 8.9 MG/DL (ref 8.6–10.5)
CHLORIDE SERPL-SCNC: 104 MMOL/L (ref 98–107)
CO2 SERPL-SCNC: 27.4 MMOL/L (ref 22–29)
CREAT SERPL-MCNC: 0.84 MG/DL (ref 0.57–1)
DEPRECATED RDW RBC AUTO: 41.9 FL (ref 37–54)
EGFRCR SERPLBLD CKD-EPI 2021: 75.8 ML/MIN/1.73
ERYTHROCYTE [DISTWIDTH] IN BLOOD BY AUTOMATED COUNT: 13.7 % (ref 12.3–15.4)
GLUCOSE SERPL-MCNC: 148 MG/DL (ref 65–99)
HCT VFR BLD AUTO: 38.8 % (ref 34–46.6)
HGB BLD-MCNC: 12.1 G/DL (ref 12–15.9)
MCH RBC QN AUTO: 26 PG (ref 26.6–33)
MCHC RBC AUTO-ENTMCNC: 31.2 G/DL (ref 31.5–35.7)
MCV RBC AUTO: 83.4 FL (ref 79–97)
PLATELET # BLD AUTO: 323 10*3/MM3 (ref 140–450)
PMV BLD AUTO: 9.9 FL (ref 6–12)
POTASSIUM SERPL-SCNC: 3.1 MMOL/L (ref 3.5–5.2)
RBC # BLD AUTO: 4.65 10*6/MM3 (ref 3.77–5.28)
SODIUM SERPL-SCNC: 141 MMOL/L (ref 136–145)
WBC NRBC COR # BLD AUTO: 5.23 10*3/MM3 (ref 3.4–10.8)

## 2024-11-12 PROCEDURE — 85027 COMPLETE CBC AUTOMATED: CPT | Performed by: INTERNAL MEDICINE

## 2024-11-12 PROCEDURE — 80048 BASIC METABOLIC PNL TOTAL CA: CPT | Performed by: INTERNAL MEDICINE

## 2024-11-12 PROCEDURE — 99152 MOD SED SAME PHYS/QHP 5/>YRS: CPT | Performed by: INTERNAL MEDICINE

## 2024-11-12 PROCEDURE — 25510000001 IOPAMIDOL PER 1 ML: Performed by: INTERNAL MEDICINE

## 2024-11-12 PROCEDURE — C1769 GUIDE WIRE: HCPCS | Performed by: INTERNAL MEDICINE

## 2024-11-12 PROCEDURE — C1894 INTRO/SHEATH, NON-LASER: HCPCS | Performed by: INTERNAL MEDICINE

## 2024-11-12 PROCEDURE — 25010000002 LIDOCAINE 2% SOLUTION: Performed by: INTERNAL MEDICINE

## 2024-11-12 PROCEDURE — 25010000002 FENTANYL CITRATE (PF) 50 MCG/ML SOLUTION: Performed by: INTERNAL MEDICINE

## 2024-11-12 PROCEDURE — 93458 L HRT ARTERY/VENTRICLE ANGIO: CPT | Performed by: INTERNAL MEDICINE

## 2024-11-12 PROCEDURE — 25010000002 MIDAZOLAM PER 1 MG: Performed by: INTERNAL MEDICINE

## 2024-11-12 PROCEDURE — 25810000003 SODIUM CHLORIDE 0.9 % SOLUTION: Performed by: INTERNAL MEDICINE

## 2024-11-12 RX ORDER — IOPAMIDOL 755 MG/ML
INJECTION, SOLUTION INTRAVASCULAR
Status: DISCONTINUED | OUTPATIENT
Start: 2024-11-12 | End: 2024-11-12 | Stop reason: HOSPADM

## 2024-11-12 RX ORDER — LIDOCAINE HYDROCHLORIDE 20 MG/ML
INJECTION, SOLUTION INFILTRATION; PERINEURAL
Status: DISCONTINUED | OUTPATIENT
Start: 2024-11-12 | End: 2024-11-12 | Stop reason: HOSPADM

## 2024-11-12 RX ORDER — POTASSIUM CHLORIDE 1500 MG/1
40 TABLET, EXTENDED RELEASE ORAL ONCE
Status: COMPLETED | OUTPATIENT
Start: 2024-11-12 | End: 2024-11-12

## 2024-11-12 RX ORDER — ACETAMINOPHEN 325 MG/1
650 TABLET ORAL EVERY 4 HOURS PRN
Status: DISCONTINUED | OUTPATIENT
Start: 2024-11-12 | End: 2024-11-12 | Stop reason: HOSPADM

## 2024-11-12 RX ORDER — MIDAZOLAM HYDROCHLORIDE 1 MG/ML
INJECTION, SOLUTION INTRAMUSCULAR; INTRAVENOUS
Status: DISCONTINUED | OUTPATIENT
Start: 2024-11-12 | End: 2024-11-12 | Stop reason: HOSPADM

## 2024-11-12 RX ORDER — SODIUM CHLORIDE 9 MG/ML
INJECTION, SOLUTION INTRAVENOUS
Status: COMPLETED | OUTPATIENT
Start: 2024-11-12 | End: 2024-11-12

## 2024-11-12 RX ORDER — FENTANYL CITRATE 50 UG/ML
INJECTION, SOLUTION INTRAMUSCULAR; INTRAVENOUS
Status: DISCONTINUED | OUTPATIENT
Start: 2024-11-12 | End: 2024-11-12 | Stop reason: HOSPADM

## 2024-11-12 RX ORDER — NITROGLYCERIN 0.4 MG/1
0.4 TABLET SUBLINGUAL
Status: DISCONTINUED | OUTPATIENT
Start: 2024-11-12 | End: 2024-11-12 | Stop reason: HOSPADM

## 2024-11-12 RX ADMIN — POTASSIUM CHLORIDE 40 MEQ: 1500 TABLET, EXTENDED RELEASE ORAL at 10:36

## 2024-11-12 NOTE — INTERVAL H&P NOTE
H&P reviewed. The patient was examined and there are no changes to the H&P.      Review of system as in H&P otherwise negative.

## 2024-11-12 NOTE — PLAN OF CARE
Goal Outcome Evaluation:      Patient is being discharged home today.

## 2024-11-12 NOTE — NURSING NOTE
Pt discharging this shift. TR band removed per protocol with no issues, dressing is C/D/I to R radial site. Pt educated on risks of bleeding and infection. Pt stated understanding. IV removed per order.Telemetry removed.

## 2024-12-03 ENCOUNTER — OFFICE VISIT (OUTPATIENT)
Dept: CARDIOLOGY | Facility: CLINIC | Age: 69
End: 2024-12-03
Payer: MEDICARE

## 2024-12-03 VITALS
OXYGEN SATURATION: 98 % | HEIGHT: 66 IN | WEIGHT: 167.6 LBS | SYSTOLIC BLOOD PRESSURE: 123 MMHG | BODY MASS INDEX: 26.93 KG/M2 | DIASTOLIC BLOOD PRESSURE: 69 MMHG | HEART RATE: 72 BPM

## 2024-12-03 DIAGNOSIS — E78.5 DYSLIPIDEMIA: ICD-10-CM

## 2024-12-03 DIAGNOSIS — I48.0 PAROXYSMAL ATRIAL FIBRILLATION: Primary | ICD-10-CM

## 2024-12-03 DIAGNOSIS — I10 ESSENTIAL HYPERTENSION: ICD-10-CM

## 2024-12-03 PROCEDURE — 3074F SYST BP LT 130 MM HG: CPT | Performed by: NURSE PRACTITIONER

## 2024-12-03 PROCEDURE — 1160F RVW MEDS BY RX/DR IN RCRD: CPT | Performed by: NURSE PRACTITIONER

## 2024-12-03 PROCEDURE — 3078F DIAST BP <80 MM HG: CPT | Performed by: NURSE PRACTITIONER

## 2024-12-03 PROCEDURE — 1159F MED LIST DOCD IN RCRD: CPT | Performed by: NURSE PRACTITIONER

## 2024-12-03 PROCEDURE — 99214 OFFICE O/P EST MOD 30 MIN: CPT | Performed by: NURSE PRACTITIONER

## 2024-12-03 NOTE — PROGRESS NOTES
Lawrence Faulkner DO  Ivis Vela  1955 12/03/2024    Patient Active Problem List   Diagnosis    Hypertension    Asthma    Osteoporosis    Left arm pain    Closed nondisplaced fracture of head of radius with routine healing    Palpitations    Dizziness and giddiness    Unstable angina    Paroxysmal atrial fibrillation    PVC's (premature ventricular contractions)    Premature atrial contractions    VT (ventricular tachycardia)    Anemia    Long term current use of antiarrhythmic medical therapy    Abnormal nuclear stress test    Status post coronary angiogram       Dear Lawrence Faulkner DO:    Subjective     Chief Complaint   Patient presents with    Follow-up     Cath Follow up   4 Week follow up       Med Management     Verbal, no medication changes,            History of Present Illness:    Ivis Vela is a 69 y.o. female with a past medical history of paroxysmal atrial fibrillation on Eliquis, PVCs, PACs, and iron deficiency anemia.  She presents today for cardiology follow-up.  Previously, was having anginal symptoms with abnormal nuclear stress test.  She underwent cardiac cath by Dr. Carrillo 11/12/2024 which revealed no significant disease.  She reports overall, she has been feeling well.  She has had 1 episode of back pain which radiated into her epigastric chest.  However, she checked her blood pressure and systolic pressure was in the 90s.          No Known Allergies:      Current Outpatient Medications:     apixaban (ELIQUIS) 5 MG tablet tablet, Take 1 tablet by mouth 2 (Two) Times a Day., Disp: 60 tablet, Rfl: 5    atorvastatin (LIPITOR) 80 MG tablet, TAKE 1 TABLET DAILY, Disp: 90 tablet, Rfl: 3    flecainide (TAMBOCOR) 100 MG tablet, Take 1 tablet by mouth 2 (Two) Times a Day., Disp: 60 tablet, Rfl: 5    isosorbide mononitrate (IMDUR) 60 MG 24 hr tablet, Take 1 tablet by mouth Daily., Disp: 30 tablet, Rfl: 5    losartan-hydrochlorothiazide (HYZAAR) 100-12.5 MG per tablet, Take 0.5 tablets by  "mouth Daily., Disp: , Rfl:     meloxicam (MOBIC) 15 MG tablet, Take 1 tablet by mouth Daily., Disp: , Rfl:     metoprolol tartrate (LOPRESSOR) 25 MG tablet, Take 1 tablet by mouth 2 (Two) Times a Day., Disp: , Rfl:     nitroglycerin (NITROSTAT) 0.4 MG SL tablet, 1 under the tongue as needed for angina, may repeat q5mins for up three doses, Disp: 25 tablet, Rfl: 2    pantoprazole (PROTONIX) 40 MG EC tablet, Take 1 tablet by mouth Daily., Disp: , Rfl:       The following portions of the patient's history were reviewed and updated as appropriate: allergies, current medications, past family history, past medical history, past social history, past surgical history and problem list.    Social History     Tobacco Use    Smoking status: Former     Current packs/day: 0.00     Types: Electronic Cigarette, Cigarettes     Quit date: 2014     Years since quitting: 10.9     Passive exposure: Past    Smokeless tobacco: Never   Vaping Use    Vaping status: Every Day    Substances: Nicotine   Substance Use Topics    Alcohol use: No    Drug use: No       Review of Systems   Constitutional: Negative for decreased appetite and malaise/fatigue.   Cardiovascular:  Positive for chest pain. Negative for dyspnea on exertion and palpitations.   Respiratory:  Negative for cough and shortness of breath.        Objective   Vitals:    12/03/24 1012   BP: 123/69   BP Location: Left arm   Patient Position: Sitting   Cuff Size: Adult   Pulse: 72   SpO2: 98%   Weight: 76 kg (167 lb 9.6 oz)   Height: 167.6 cm (65.98\")     Body mass index is 27.06 kg/m².        Vitals reviewed.   Constitutional:       Appearance: Healthy appearance. Well-developed and not in distress.   HENT:      Head: Normocephalic and atraumatic.   Neck:      Vascular: No carotid bruit or JVD.   Pulmonary:      Effort: Pulmonary effort is normal.      Breath sounds: Normal breath sounds. No wheezing. No rales.   Cardiovascular:      Normal rate. Regular rhythm.      Murmurs: There " is no murmur.      . No S3 and S4 gallop.   Edema:     Peripheral edema absent.   Abdominal:      General: Bowel sounds are normal.      Palpations: Abdomen is soft.   Skin:     General: Skin is warm and dry.   Neurological:      Mental Status: Alert, oriented to person, place, and time and oriented to person, place and time.   Psychiatric:         Mood and Affect: Mood normal.         Behavior: Behavior normal.         Lab Results   Component Value Date     11/12/2024    K 3.1 (L) 11/12/2024     11/12/2024    CO2 27.4 11/12/2024    BUN 9 11/12/2024    CREATININE 0.84 11/12/2024    GLUCOSE 148 (H) 11/12/2024    CALCIUM 8.9 11/12/2024    AST 14 03/06/2024    ALT 15 03/06/2024    ALKPHOS 78 03/06/2024     Lab Results   Component Value Date    WBC 5.23 11/12/2024    HGB 12.1 11/12/2024    HCT 38.8 11/12/2024     11/12/2024     Lab Results   Component Value Date    TSH 1.390 03/21/2024    TRIG 96 10/18/2022    HDL 55 10/18/2022    LDL 85 10/18/2022          Results for orders placed during the hospital encounter of 10/18/22    Adult Transthoracic Echo Complete w/ Color, Spectral and Contrast if necessary per protocol    Interpretation Summary    Left ventricular ejection fraction appears to be 51 - 55%.    Left ventricular wall thickness is consistent with mild to moderate septal asymmetric hypertrophy.    Left ventricular diastolic function is consistent with (grade I) impaired relaxation.    Estimated right ventricular systolic pressure from tricuspid regurgitation is normal (<35 mmHg).     Results for orders placed during the hospital encounter of 10/30/24    Stress Test With Myocardial Perfusion One Day    Interpretation Summary  Images from the original result were not included.      A pharmacological stress test was performed using regadenoson without low-level exercise.    Findings consistent with an indeterminate ECG stress test.    Myocardial perfusion imaging indicates a moderate-sized,  moderate degree of ischemia located in the anterior wall and lateral wall.    Normal LV cavity size. Normal LV wall motion noted.    Left ventricular ejection fraction is normal (Calculated EF = 69%).    Impressions are consistent with an intermediate to high risk study.       Results for orders placed during the hospital encounter of 11/12/24    Cardiac Catheterization/Vascular Study    Conclusion  Conclusion:  1.  No significant angiographic evidence of coronary artery disease in a codominant circulation.        Recommendation:  1.  Optimize medical therapy.  2.  Routine post-cath care.        Procedures    Assessment & Plan    Diagnosis Plan   1. Paroxysmal atrial fibrillation with a [id number] score of 3.  Patient is on Eliquis for stroke prevention no major bleeding issues.        2. Essential hypertension        3. Dyslipidemia                 Recommendations:  Paroxysmal atrial fibrillation-maintaining sinus rhythm.  Continue metoprolol, flecainide, and Eliquis  Essential hypertension-BP well-controlled.  She will hold metoprolol if systolic blood pressure is less than 110 mm/hg.  Dyslipidemia - continue atorvastatin. LDL goal <100.  Chest pain - recent cath showing no significant coronary artery disease.   Follow up in 6 months or sooner if needed.      Return in about 6 months (around 6/3/2025) for Recheck.    As always, I appreciate very much the opportunity to participate in the cardiovascular care of your patients.      With Best Regards,    GAYLE Haskins

## 2025-02-04 ENCOUNTER — TRANSCRIBE ORDERS (OUTPATIENT)
Dept: ADMINISTRATIVE | Facility: HOSPITAL | Age: 70
End: 2025-02-04
Payer: MEDICARE

## 2025-02-04 DIAGNOSIS — Z78.0 ASYMPTOMATIC POSTMENOPAUSAL STATUS: Primary | ICD-10-CM

## 2025-02-21 ENCOUNTER — HOSPITAL ENCOUNTER (OUTPATIENT)
Facility: HOSPITAL | Age: 70
Discharge: HOME | End: 2025-02-21
Payer: MEDICARE

## 2025-02-21 DIAGNOSIS — Z78.0 ASYMPTOMATIC POSTMENOPAUSAL STATUS: ICD-10-CM

## 2025-02-21 PROCEDURE — 77080 DXA BONE DENSITY AXIAL: CPT

## 2025-02-21 PROCEDURE — 77080 DXA BONE DENSITY AXIAL: CPT | Performed by: RADIOLOGY

## 2025-03-21 ENCOUNTER — OFFICE VISIT (OUTPATIENT)
Dept: CARDIOLOGY | Facility: CLINIC | Age: 70
End: 2025-03-21
Payer: MEDICARE

## 2025-03-21 VITALS
HEART RATE: 75 BPM | BODY MASS INDEX: 27.13 KG/M2 | WEIGHT: 168.8 LBS | HEIGHT: 66 IN | DIASTOLIC BLOOD PRESSURE: 70 MMHG | OXYGEN SATURATION: 96 % | SYSTOLIC BLOOD PRESSURE: 132 MMHG

## 2025-03-21 DIAGNOSIS — I49.3 PVC'S (PREMATURE VENTRICULAR CONTRACTIONS): Chronic | ICD-10-CM

## 2025-03-21 DIAGNOSIS — Z79.899 LONG TERM CURRENT USE OF ANTIARRHYTHMIC MEDICAL THERAPY: ICD-10-CM

## 2025-03-21 DIAGNOSIS — I10 PRIMARY HYPERTENSION: Chronic | ICD-10-CM

## 2025-03-21 DIAGNOSIS — I48.0 PAROXYSMAL ATRIAL FIBRILLATION: Primary | Chronic | ICD-10-CM

## 2025-03-21 PROCEDURE — 1160F RVW MEDS BY RX/DR IN RCRD: CPT

## 2025-03-21 PROCEDURE — 3078F DIAST BP <80 MM HG: CPT

## 2025-03-21 PROCEDURE — 1159F MED LIST DOCD IN RCRD: CPT

## 2025-03-21 PROCEDURE — 3075F SYST BP GE 130 - 139MM HG: CPT

## 2025-03-21 PROCEDURE — 99214 OFFICE O/P EST MOD 30 MIN: CPT

## 2025-03-21 NOTE — PROGRESS NOTES
Cardiac Electrophysiology Outpatient Note  [facility] at [facility]    Office Visit     Ivis Vlea  [id number]  03/21/2025    Primary Care Physician: Lawrence Faulkner DO    Referred By: No ref. provider found    Subjective     Chief Complaint   Patient presents with    Paroxysmal atrial fibrillation     Problem List:  Paroxsymal atrial fibrillation  CHADS2Vasc=3, on eliquis  30 day event monitor 09/2022: Predominantly sinus heart rate ranging from  bpm, frequent PACs and PVCs with short runs of A. fib, ventricular bigeminy.  5 beat and 25 beat run of VT, rate around 200 bpm.  Patient reported symptoms of heart fluttering, dizziness, chest pain correlated with normal sinus rhythm. 3% PVCs, 7% PACs  Ventricular tachycardia  30 day event monitor 09/2022:  5 beat and 25 beat run of VT, rate around 200 bpm.   Echo 10/19/2022: EF 51-55%, mild to moderate septal asymmetric hypertrophy, grade I diastolic dysfunction  CAD, Chest pain  Stress test 08/26/2022: no evidence of ischemia, low risk study  [facility] 10/19/2022: moderate CAD, no stents placed, aggressive medical management and risk factor stratification   ED visit [facility] for CP 11/01/2022, negative troponin, negative work up  [facility] 11/2024 Dr. Carrillo: No significant angiographic evidence of CAD in a codominant circulation  Hypertension  Asthma  Former tobacco abuse    History of Present Illness:   Ivis Vela is a 69 y.o. female who presents to my electrophysiology clinic for follow up of paroxysmal atrial fibrillation on flecainide and Eliquis, PVCs, PACs, anemia and hypertension.  At our last visit in September, the patient was complaining of palpitations.  We placed an ambulatory monitor that showed no arrhythmia.  All triggered events were associate with normal sinus rhythm.  Since then, she continues to have some mild palpitations daily lasting about 1 to 3 minutes.  She reports they are tolerable.  Reported events during  above-mentioned amatory monitor coincided with normal sinus rhythm.  She has also had a negative heart catheterization.    Past Medical History:   Diagnosis Date    Arm fracture     Asthma     Atrial fibrillation     GERD (gastroesophageal reflux disease)     Hyperlipidemia     Hypertension     Osteoporosis     Status post coronary angiogram 2024       Past Surgical History:   Procedure Laterality Date    BREAST BIOPSY      CARDIAC CATHETERIZATION N/A 10/19/2022    Procedure: Left Heart Cath;  Surgeon: Piotr Edwards DO;  Location: [facility] CATH INVASIVE LOCATION;  Service: Cardiology;  Laterality: N/A;    CARDIAC CATHETERIZATION N/A 2024    Procedure: Left Heart Cath;  Surgeon: Carola Carrillo MD;  Location: [facility] CATH INVASIVE LOCATION;  Service: Cardiovascular;  Laterality: N/A;    CARPAL TUNNEL RELEASE Bilateral     CATARACT EXTRACTION      CHOLECYSTECTOMY      COLONOSCOPY N/A 3/15/2024    Procedure: COLONOSCOPY;  Surgeon: Rufino Cohen MD;  Location: [facility] OR;  Service: Gastroenterology;  Laterality: N/A;    ENDOSCOPY N/A 3/8/2024    Procedure: ESOPHAGOGASTRODUODENOSCOPY WITH ANESTHESIA;  Surgeon: Rufino Cohen MD;  Location: [facility] OR;  Service: Gastroenterology;  Laterality: N/A;    ESOPHAGOSCOPY W/ DILATION      HYSTERECTOMY         Family History   Problem Relation Age of Onset    Hypertension Mother     Heart disease Father     Hypertension Father     Hypertension Sister     Heart attack Sister     Aneurysm Sister         brain    Drug abuse Sister     Heart attack Brother 48    Heart disease Brother     Hypertension Brother     Diabetes Brother     Breast cancer Paternal Cousin     Breast cancer Maternal Cousin        Social History     Socioeconomic History    Marital status:    Tobacco Use    Smoking status: Former     Current packs/day: 0.00     Types: Electronic Cigarette, Cigarettes     Quit date:      Years since quittin.2     Passive exposure: Past     "Smokeless tobacco: Never   Vaping Use    Vaping status: Every Day    Substances: Nicotine   Substance and Sexual Activity    Alcohol use: No    Drug use: No    Sexual activity: Defer         Current Outpatient Medications:     amitriptyline (ELAVIL) 25 MG tablet, TAKE 1 TO 2 TABLETS BY MOUTH EVERY DAY AT BEDTIME AS NEEDED, Disp: , Rfl:     apixaban (ELIQUIS) 5 MG tablet tablet, Take 1 tablet by mouth 2 (Two) Times a Day., Disp: 60 tablet, Rfl: 5    atorvastatin (LIPITOR) 80 MG tablet, TAKE 1 TABLET DAILY, Disp: 90 tablet, Rfl: 3    flecainide (TAMBOCOR) 100 MG tablet, Take 1 tablet by mouth 2 (Two) Times a Day., Disp: 60 tablet, Rfl: 5    isosorbide mononitrate (IMDUR) 60 MG 24 hr tablet, Take 1 tablet by mouth Daily., Disp: 30 tablet, Rfl: 5    losartan-hydrochlorothiazide (HYZAAR) 100-12.5 MG per tablet, Take 0.5 tablets by mouth Daily., Disp: , Rfl:     meloxicam (MOBIC) 15 MG tablet, Take 1 tablet by mouth Daily., Disp: , Rfl:     metoprolol tartrate (LOPRESSOR) 25 MG tablet, Take 1 tablet by mouth 2 (Two) Times a Day., Disp: , Rfl:     nitroglycerin (NITROSTAT) 0.4 MG SL tablet, 1 under the tongue as needed for angina, may repeat q5mins for up three doses, Disp: 25 tablet, Rfl: 2    pantoprazole (PROTONIX) 40 MG EC tablet, Take 1 tablet by mouth Daily., Disp: , Rfl:     Allergies:   No Known Allergies    Objective   Vital Signs: Blood pressure 132/70, pulse 75, height 167.6 cm (66\"), weight 76.6 kg (168 lb 12.8 oz), SpO2 96%.    PHYSICAL EXAM  General appearance: Awake, alert, cooperative  Head: Normocephalic, without obvious abnormality, atraumatic  Lungs: Clear to ascultation bilaterally  Heart: Regular rate and rhythm, no murmurs, no lower extremity swelling  Skin: Skin color, turgor normal, no rashes or lesions  Neurologic: Grossly normal     Lab Results   Component Value Date    GLUCOSE 148 (H) 11/12/2024    CALCIUM 8.9 11/12/2024     11/12/2024    K 3.1 (L) 11/12/2024    CO2 27.4 11/12/2024    CL " 104 11/12/2024    BUN 9 11/12/2024    CREATININE 0.84 11/12/2024    EGFRIFNONA 75 07/24/2020    BCR 10.7 11/12/2024    ANIONGAP 9.6 11/12/2024     Lab Results   Component Value Date    WBC 5.23 11/12/2024    HGB 12.1 11/12/2024    HCT 38.8 11/12/2024    MCV 83.4 11/12/2024     11/12/2024     Lab Results   Component Value Date    INR 0.94 04/24/2023    INR 1.04 11/01/2022    INR 1.05 10/19/2022    PROTIME 13.1 04/24/2023    PROTIME 13.9 11/01/2022    PROTIME 13.9 10/19/2022     Lab Results   Component Value Date    TSH 1.390 03/21/2024          Results for orders placed during the hospital encounter of 10/18/22    Adult Transthoracic Echo Complete w/ Color, Spectral and Contrast if necessary per protocol    Interpretation Summary    Left ventricular ejection fraction appears to be 51 - 55%.    Left ventricular wall thickness is consistent with mild to moderate septal asymmetric hypertrophy.    Left ventricular diastolic function is consistent with (grade I) impaired relaxation.    Estimated right ventricular systolic pressure from tricuspid regurgitation is normal (<35 mmHg).     Results for orders placed during the hospital encounter of 11/12/24    Cardiac Catheterization/Vascular Study    Conclusion  Conclusion:  1.  No significant angiographic evidence of coronary artery disease in a codominant circulation.        Recommendation:  1.  Optimize medical therapy.  2.  Routine post-cath care.      I personally viewed and interpreted the patient's EKG/Telemetry/lab data      ECG 12 Lead    Date/Time: 3/30/2025 1:58 PM  Performed by: Damion Day PA-C    Authorized by: Damion Day PA-C  Comparison: compared with previous ECG from 9/10/2024  Similar to previous ECG  Rhythm: sinus rhythm  BPM: 74  Conduction: non-specific intraventricular conduction delay  Other findings: T wave abnormality    Clinical impression: abnormal EKG          Ivis Vela  reports that she quit smoking about 11 years ago. Her  smoking use included electronic cigarette and cigarettes. She has been exposed to tobacco smoke. She has never used smokeless tobacco.    Advance Care Planning   Advance Care Planning: ACP discussion was declined by the patient. Patient does not have an advance directive, information provided.     Assessment & Plan    1. Paroxysmal atrial fibrillation  No documented recurrence of atrial fibrillation on flecainide 100 mg twice daily.  The patient continues to have tolerable, short tachypalpitations 1 to 3 minutes a day.  Ambulatory monitor since showed no arrhythmia with reported symptoms coinciding with normal sinus rhythm.  Continue Eliquis for stroke prophylaxis.    2. PVC's (premature ventricular contractions)  On flecainide with no PVCs on recent ambulatory monitor    3. Long term current use of antiarrhythmic medical therapy  QRS stable.  Continue flecainide    4. Primary hypertension  BP controlled in office today.  Continue current antihypertensive regimen.       Follow Up:  Return in about 41 weeks (around 1/2/2026).      Thank you for allowing me to participate in the care of your patient. Please do not hesitate to contact me with additional questions or concerns.      Damion Day PA-C  Cardiac Electrophysiology  [facility] / [facility]

## 2025-03-30 PROCEDURE — 93000 ELECTROCARDIOGRAM COMPLETE: CPT

## 2025-04-01 ENCOUNTER — TRANSCRIBE ORDERS (OUTPATIENT)
Dept: ADMINISTRATIVE | Facility: HOSPITAL | Age: 70
End: 2025-04-01
Payer: MEDICARE

## 2025-04-01 DIAGNOSIS — M25.512 LEFT SHOULDER PAIN, UNSPECIFIED CHRONICITY: Primary | ICD-10-CM

## 2025-04-08 ENCOUNTER — HOSPITAL ENCOUNTER (OUTPATIENT)
Dept: CT IMAGING | Facility: HOSPITAL | Age: 70
Discharge: HOME | End: 2025-04-08
Payer: MEDICARE

## 2025-04-08 DIAGNOSIS — M25.512 LEFT SHOULDER PAIN, UNSPECIFIED CHRONICITY: ICD-10-CM

## 2025-04-08 PROCEDURE — 73200 CT UPPER EXTREMITY W/O DYE: CPT | Performed by: RADIOLOGY

## 2025-04-08 PROCEDURE — 73200 CT UPPER EXTREMITY W/O DYE: CPT

## 2025-04-14 DIAGNOSIS — I48.0 PAROXYSMAL ATRIAL FIBRILLATION: ICD-10-CM

## 2025-04-14 RX ORDER — FLECAINIDE ACETATE 100 MG/1
100 TABLET ORAL 2 TIMES DAILY
Qty: 60 TABLET | Refills: 5 | Status: SHIPPED | OUTPATIENT
Start: 2025-04-14

## 2025-04-14 NOTE — TELEPHONE ENCOUNTER
Caller: Ivis Vela Ga    Relationship: Self    Best call back number: [phone]     Requested Prescriptions:   Requested Prescriptions     Pending Prescriptions Disp Refills    apixaban (ELIQUIS) 5 MG tablet tablet 60 tablet 5     Sig: Take 1 tablet by mouth 2 (Two) Times a Day.    flecainide (TAMBOCOR) 100 MG tablet 60 tablet 5     Sig: Take 1 tablet by mouth 2 (Two) Times a Day.        Pharmacy where request should be sent: [address] [phone] [facility] [phone]      Last office visit with prescribing clinician: 12/3/2024   Last telemedicine visit with prescribing clinician: Visit date not found   Next office visit with prescribing clinician: 6/3/2025     Additional details provided by patient:   1 DAY LEFT ON ELIQUIS, 0 ON OTHER       Does the patient have less than a 3 day supply:  [x] Yes  [] No    Would you like a call back once the refill request has been completed: [] Yes [] No    If the office needs to give you a call back, can they leave a voicemail: [] Yes [] No    Clifford Fuentes Rep   04/14/25 08:28 EDT

## 2025-05-02 ENCOUNTER — APPOINTMENT (OUTPATIENT)
Facility: HOSPITAL | Age: 70
End: 2025-05-02
Payer: MEDICARE

## 2025-05-02 ENCOUNTER — HOSPITAL ENCOUNTER (OUTPATIENT)
Facility: HOSPITAL | Age: 70
Discharge: HOME | End: 2025-05-02
Payer: MEDICARE

## 2025-05-02 ENCOUNTER — TRANSCRIBE ORDERS (OUTPATIENT)
Dept: ADMINISTRATIVE | Facility: HOSPITAL | Age: 70
End: 2025-05-02
Payer: MEDICARE

## 2025-05-02 DIAGNOSIS — R31.9 HEMATURIA, UNSPECIFIED TYPE: ICD-10-CM

## 2025-05-02 DIAGNOSIS — R31.9 HEMATURIA, UNSPECIFIED TYPE: Primary | ICD-10-CM

## 2025-05-02 PROCEDURE — 76775 US EXAM ABDO BACK WALL LIM: CPT | Performed by: RADIOLOGY

## 2025-05-02 PROCEDURE — 76775 US EXAM ABDO BACK WALL LIM: CPT

## 2025-05-06 ENCOUNTER — TRANSCRIBE ORDERS (OUTPATIENT)
Dept: ADMINISTRATIVE | Facility: HOSPITAL | Age: 70
End: 2025-05-06
Payer: MEDICARE

## 2025-05-06 DIAGNOSIS — R93.41 ABNORMAL ULTRASOUND OF BLADDER: Primary | ICD-10-CM

## 2025-05-13 ENCOUNTER — HOSPITAL ENCOUNTER (OUTPATIENT)
Dept: CT IMAGING | Facility: HOSPITAL | Age: 70
Discharge: HOME | End: 2025-05-13
Admitting: FAMILY MEDICINE
Payer: MEDICARE

## 2025-05-13 DIAGNOSIS — R93.41 ABNORMAL ULTRASOUND OF BLADDER: ICD-10-CM

## 2025-05-13 PROCEDURE — 74177 CT ABD & PELVIS W/CONTRAST: CPT

## 2025-05-13 PROCEDURE — 82565 ASSAY OF CREATININE: CPT

## 2025-05-13 PROCEDURE — 25510000001 IOPAMIDOL 61 % SOLUTION: Performed by: FAMILY MEDICINE

## 2025-05-13 PROCEDURE — 74177 CT ABD & PELVIS W/CONTRAST: CPT | Performed by: RADIOLOGY

## 2025-05-13 RX ORDER — IOPAMIDOL 612 MG/ML
100 INJECTION, SOLUTION INTRAVASCULAR
Status: COMPLETED | OUTPATIENT
Start: 2025-05-13 | End: 2025-05-13

## 2025-05-13 RX ADMIN — IOPAMIDOL 100 ML: 612 INJECTION, SOLUTION INTRAVENOUS at 11:05

## 2025-05-14 LAB — CREAT BLDA-MCNC: 0.9 MG/DL (ref 0.6–1.3)

## 2025-05-19 ENCOUNTER — OFFICE VISIT (OUTPATIENT)
Dept: UROLOGY | Facility: CLINIC | Age: 70
End: 2025-05-19
Payer: MEDICARE

## 2025-05-19 VITALS
HEIGHT: 66 IN | WEIGHT: 169 LBS | BODY MASS INDEX: 27.16 KG/M2 | DIASTOLIC BLOOD PRESSURE: 63 MMHG | HEART RATE: 80 BPM | SYSTOLIC BLOOD PRESSURE: 117 MMHG

## 2025-05-19 DIAGNOSIS — N20.1 LEFT URETERAL STONE: Primary | ICD-10-CM

## 2025-05-19 RX ORDER — TAMSULOSIN HYDROCHLORIDE 0.4 MG/1
1 CAPSULE ORAL DAILY
Qty: 30 CAPSULE | Refills: 0 | Status: SHIPPED | OUTPATIENT
Start: 2025-05-19

## 2025-05-19 NOTE — H&P (VIEW-ONLY)
"Chief Complaint:    Chief Complaint   Patient presents with    Hydroureter        Vital Signs:   /63 (BP Location: Right arm, Patient Position: Sitting)   Pulse 80   Ht 167.6 cm (65.98\")   Wt 76.7 kg (169 lb)   BMI 27.29 kg/m²   Body mass index is 27.29 kg/m².      HPI:  Ivis Vela is a 69 y.o. female who presents today for follow up    History of Present Illness  Ms. Vela presents to the clinic for an actual evaluation of abnormal CT scan.  Patient reports that over the past month she has noticed some frequency, urgency, and pelvic pressure symptoms.  She had noticed gross blood in her urine and then completed a renal ultrasound on 5/2/2024 that showed concerns of a right-sided hypoechoic lesion and recommended renal mass CT/MRI for further workup.  There was no concerns of any hydronephrosis or stone.  They proceeded forward with a CT scan abdomen pelvis on 5/13/2025 which revealed mild right-sided hydronephrosis with no abnormalities of the bladder or the left kidney.  There was no mention of any stones on report.  Urine culture at the beginning of this month showed no growth.  She denies any current pain but does endorse frequency and urgency.  She has some mild dysuria.  I did review the patient's CT scan and she had a distal left UVJ calculus causing no obstructive uropathy.  This was roughly 3 to 4 mm in size.      Past Medical History:  Past Medical History:   Diagnosis Date    Arm fracture     Asthma     Atrial fibrillation     GERD (gastroesophageal reflux disease)     Hyperlipidemia     Hypertension     Osteoporosis     Status post coronary angiogram 11/12/2024       Current Meds:  Current Outpatient Medications   Medication Sig Dispense Refill    amitriptyline (ELAVIL) 25 MG tablet TAKE 1 TO 2 TABLETS BY MOUTH EVERY DAY AT BEDTIME AS NEEDED      apixaban (ELIQUIS) 5 MG tablet tablet Take 1 tablet by mouth 2 (Two) Times a Day. 60 tablet 5    atorvastatin (LIPITOR) 80 MG tablet TAKE 1 " TABLET DAILY 90 tablet 3    flecainide (TAMBOCOR) 100 MG tablet Take 1 tablet by mouth 2 (Two) Times a Day. 60 tablet 5    isosorbide mononitrate (IMDUR) 60 MG 24 hr tablet Take 1 tablet by mouth Daily. 30 tablet 5    losartan-hydrochlorothiazide (HYZAAR) 100-12.5 MG per tablet Take 0.5 tablets by mouth Daily.      meloxicam (MOBIC) 15 MG tablet Take 1 tablet by mouth Daily.      metoprolol tartrate (LOPRESSOR) 25 MG tablet Take 1 tablet by mouth 2 (Two) Times a Day.      nitroglycerin (NITROSTAT) 0.4 MG SL tablet 1 under the tongue as needed for angina, may repeat q5mins for up three doses 25 tablet 2    pantoprazole (PROTONIX) 40 MG EC tablet Take 1 tablet by mouth Daily.      tamsulosin (FLOMAX) 0.4 MG capsule 24 hr capsule Take 1 capsule by mouth Daily. 30 capsule 0     No current facility-administered medications for this visit.        Allergies:   No Known Allergies     Past Surgical History:  Past Surgical History:   Procedure Laterality Date    BREAST BIOPSY      CARDIAC CATHETERIZATION N/A 10/19/2022    Procedure: Left Heart Cath;  Surgeon: Piotr Edwards DO;  Location: [facility] CATH INVASIVE LOCATION;  Service: Cardiology;  Laterality: N/A;    CARDIAC CATHETERIZATION N/A 11/12/2024    Procedure: Left Heart Cath;  Surgeon: Carola Carrillo MD;  Location: [facility] CATH INVASIVE LOCATION;  Service: Cardiovascular;  Laterality: N/A;    CARPAL TUNNEL RELEASE Bilateral     CATARACT EXTRACTION      CHOLECYSTECTOMY      COLONOSCOPY N/A 3/15/2024    Procedure: COLONOSCOPY;  Surgeon: Rufino Cohen MD;  Location: [facility] OR;  Service: Gastroenterology;  Laterality: N/A;    ENDOSCOPY N/A 3/8/2024    Procedure: ESOPHAGOGASTRODUODENOSCOPY WITH ANESTHESIA;  Surgeon: Rufino Cohen MD;  Location: [facility] OR;  Service: Gastroenterology;  Laterality: N/A;    ESOPHAGOSCOPY W/ DILATION      HYSTERECTOMY         Social History:  Social History     Socioeconomic History    Marital status:    Tobacco Use    Smoking  status: Former     Current packs/day: 0.00     Types: Electronic Cigarette, Cigarettes     Quit date:      Years since quittin.3     Passive exposure: Past    Smokeless tobacco: Never   Vaping Use    Vaping status: Every Day    Substances: Nicotine   Substance and Sexual Activity    Alcohol use: No    Drug use: No    Sexual activity: Defer       Family History:  Family History   Problem Relation Age of Onset    Hypertension Mother     Heart disease Father     Hypertension Father     Hypertension Sister     Heart attack Sister     Aneurysm Sister         brain    Drug abuse Sister     Heart attack Brother 48    Heart disease Brother     Hypertension Brother     Diabetes Brother     Breast cancer Paternal Cousin     Breast cancer Maternal Cousin        Review of Systems:  Review of Systems   Constitutional:  Negative for chills, fatigue and fever.   Respiratory:  Negative for cough, shortness of breath and wheezing.    Cardiovascular:  Negative for leg swelling.   Gastrointestinal:  Negative for abdominal pain, nausea and vomiting.   Genitourinary:  Positive for frequency, hematuria and urgency. Negative for difficulty urinating and dysuria.   Musculoskeletal:  Positive for back pain. Negative for joint swelling.   Neurological:  Negative for dizziness and headaches.   Psychiatric/Behavioral:  Negative for confusion.        Physical Exam:  Physical Exam  Constitutional:       General: She is not in acute distress.     Appearance: Normal appearance.   HENT:      Head: Normocephalic and atraumatic.      Nose: Nose normal.      Mouth/Throat:      Mouth: Mucous membranes are moist.   Eyes:      Conjunctiva/sclera: Conjunctivae normal.   Cardiovascular:      Rate and Rhythm: Normal rate.      Pulses: Normal pulses.   Pulmonary:      Effort: Pulmonary effort is normal.   Abdominal:      Palpations: Abdomen is soft.   Musculoskeletal:         General: Normal range of motion.      Cervical back: Normal range of  motion.   Skin:     General: Skin is warm.   Neurological:      General: No focal deficit present.      Mental Status: She is alert and oriented to person, place, and time.   Psychiatric:         Mood and Affect: Mood normal.         Behavior: Behavior normal.         Thought Content: Thought content normal.         Judgment: Judgment normal.           Recent Image (CT and/or KUB):   CT Abdomen and Pelvis: No results found for this or any previous visit.     CT Stone Protocol: No results found for this or any previous visit.     KUB: No results found for this or any previous visit.       Labs:  Brief Urine Lab Results       None          Hospital Outpatient Visit on 05/13/2025   Component Date Value Ref Range Status    Creatinine 05/13/2025 0.90  0.60 - 1.30 mg/dL Final    Serial Number: [id number]:  [id number]        Procedure: None  Procedures     I have reviewed and agree with the above PMH, PSH, FMH, social history, medications, allergies, and labs.     Assessment/Plan:   Problem List Items Addressed This Visit       Left ureteral stone - Primary    Relevant Medications    tamsulosin (FLOMAX) 0.4 MG capsule 24 hr capsule       Health Maintenance:   Health Maintenance Due   Topic Date Due    Pneumococcal Vaccine 50+ (1 of 2 - PCV) Never done    TDAP/TD VACCINES (1 - Tdap) Never done    HEPATITIS C SCREENING  Never done    ANNUAL WELLNESS VISIT  Never done    ZOSTER VACCINE (3 of 3) 03/06/2023    COVID-19 Vaccine (4 - 2024-25 season) 09/01/2024        Smoking Counseling: Former smoker.  Never used smokeless tobacco.    Urine Incontinence: Patient reports that she is not currently experiencing any symptoms of urinary incontinence.    Patient was given instructions and counseling regarding her condition or for health maintenance advice. Please see specific information pulled into the AVS if appropriate.    Patient Education:   Left ureteral calculus -it was discussed with the patient the presence of a distal left  UVJ calculus causing obstructive uropathy.  No other kidney stones were noted.  Patient's hematuria is most likely secondary to stone.  We discussed the various therapeutic options available including percutaneous nephrostolithotomy, ureteroscopy and extracorporeal shockwave  lithotripsy.  We discussed the risks of lithotripsy including the passage of stones leading to a 3% chance of Steinstrasse or a large string of stones in the distal ureter. In this incidence the patient was informed that a ureteroscopy is indicated for obstructing fragments.  Patient was informed of an extremely rare incidence of renal hematoma and the significance of this.  Patient was educated on percutaneous nephrostolithotomy and its use as well as the risks and benefits such as the need for postoperative hospitalization, and the risk of damage to the kidney and the remote risk of a nephrectomy.  We also discussed the use of ureteroscopy in the upper tracts and its decreased success rate to completely remove the stones likely causing stent placement leading to an additional procedure for removal.  We discussed the absolute relative indicators for intervention including the presence of sepsis and uncontrollable pain leading to need for urgent intervention.  We discussed placement of a stent if indicated and the management of the stent as well.  Given patient's symptoms are well-controlled at this time proceed forward with Flomax 0.4 mg once daily and possible trial of spontaneous passage using medications.  Did advise her symptoms worsen or go to the nearest ER or call and we will schedule her for surgical intervention including a left uteroscopy with holmium laser lithotripsy.  Did discuss today risk of this procedure above.  Otherwise we will place her back in roughly 1 month.  She verbalized understanding.    Visit Diagnoses:    ICD-10-CM ICD-9-CM   1. Left ureteral stone  N20.1 592.1     A total of 30 minutes were spent coordinating this  patient’s care in clinic today; 15 minutes of which were face-to-face with the patient, reviewing medical history and counseling on the current treatment and followup plan.  All questions were answered to patient's satisfaction.    Meds Ordered During Visit:  New Medications Ordered This Visit   Medications    tamsulosin (FLOMAX) 0.4 MG capsule 24 hr capsule     Sig: Take 1 capsule by mouth Daily.     Dispense:  30 capsule     Refill:  0       Follow Up Appointment: 1 month  No follow-ups on file.      This document has been electronically signed by Mau Vergara PA-C   May 19, 2025 17:04 EDT    Part of this note may be an electronic transcription/translation of spoken language to printed text using the Dragon Dictation System.

## 2025-05-19 NOTE — PROGRESS NOTES
"Chief Complaint:    Chief Complaint   Patient presents with    Hydroureter        Vital Signs:   /63 (BP Location: Right arm, Patient Position: Sitting)   Pulse 80   Ht 167.6 cm (65.98\")   Wt 76.7 kg (169 lb)   BMI 27.29 kg/m²   Body mass index is 27.29 kg/m².      HPI:  Ivis Vela is a 69 y.o. female who presents today for follow up    History of Present Illness  Ms. Vela presents to the clinic for an actual evaluation of abnormal CT scan.  Patient reports that over the past month she has noticed some frequency, urgency, and pelvic pressure symptoms.  She had noticed gross blood in her urine and then completed a renal ultrasound on 5/2/2024 that showed concerns of a right-sided hypoechoic lesion and recommended renal mass CT/MRI for further workup.  There was no concerns of any hydronephrosis or stone.  They proceeded forward with a CT scan abdomen pelvis on 5/13/2025 which revealed mild right-sided hydronephrosis with no abnormalities of the bladder or the left kidney.  There was no mention of any stones on report.  Urine culture at the beginning of this month showed no growth.  She denies any current pain but does endorse frequency and urgency.  She has some mild dysuria.  I did review the patient's CT scan and she had a distal left UVJ calculus causing no obstructive uropathy.  This was roughly 3 to 4 mm in size.      Past Medical History:  Past Medical History:   Diagnosis Date    Arm fracture     Asthma     Atrial fibrillation     GERD (gastroesophageal reflux disease)     Hyperlipidemia     Hypertension     Osteoporosis     Status post coronary angiogram 11/12/2024       Current Meds:  Current Outpatient Medications   Medication Sig Dispense Refill    amitriptyline (ELAVIL) 25 MG tablet TAKE 1 TO 2 TABLETS BY MOUTH EVERY DAY AT BEDTIME AS NEEDED      apixaban (ELIQUIS) 5 MG tablet tablet Take 1 tablet by mouth 2 (Two) Times a Day. 60 tablet 5    atorvastatin (LIPITOR) 80 MG tablet TAKE 1 " TABLET DAILY 90 tablet 3    flecainide (TAMBOCOR) 100 MG tablet Take 1 tablet by mouth 2 (Two) Times a Day. 60 tablet 5    isosorbide mononitrate (IMDUR) 60 MG 24 hr tablet Take 1 tablet by mouth Daily. 30 tablet 5    losartan-hydrochlorothiazide (HYZAAR) 100-12.5 MG per tablet Take 0.5 tablets by mouth Daily.      meloxicam (MOBIC) 15 MG tablet Take 1 tablet by mouth Daily.      metoprolol tartrate (LOPRESSOR) 25 MG tablet Take 1 tablet by mouth 2 (Two) Times a Day.      nitroglycerin (NITROSTAT) 0.4 MG SL tablet 1 under the tongue as needed for angina, may repeat q5mins for up three doses 25 tablet 2    pantoprazole (PROTONIX) 40 MG EC tablet Take 1 tablet by mouth Daily.      tamsulosin (FLOMAX) 0.4 MG capsule 24 hr capsule Take 1 capsule by mouth Daily. 30 capsule 0     No current facility-administered medications for this visit.        Allergies:   No Known Allergies     Past Surgical History:  Past Surgical History:   Procedure Laterality Date    BREAST BIOPSY      CARDIAC CATHETERIZATION N/A 10/19/2022    Procedure: Left Heart Cath;  Surgeon: Piotr Edwards DO;  Location: [facility] CATH INVASIVE LOCATION;  Service: Cardiology;  Laterality: N/A;    CARDIAC CATHETERIZATION N/A 11/12/2024    Procedure: Left Heart Cath;  Surgeon: Carola Carrillo MD;  Location: [facility] CATH INVASIVE LOCATION;  Service: Cardiovascular;  Laterality: N/A;    CARPAL TUNNEL RELEASE Bilateral     CATARACT EXTRACTION      CHOLECYSTECTOMY      COLONOSCOPY N/A 3/15/2024    Procedure: COLONOSCOPY;  Surgeon: Rufino Cohen MD;  Location: [facility] OR;  Service: Gastroenterology;  Laterality: N/A;    ENDOSCOPY N/A 3/8/2024    Procedure: ESOPHAGOGASTRODUODENOSCOPY WITH ANESTHESIA;  Surgeon: Rufino Cohen MD;  Location: [facility] OR;  Service: Gastroenterology;  Laterality: N/A;    ESOPHAGOSCOPY W/ DILATION      HYSTERECTOMY         Social History:  Social History     Socioeconomic History    Marital status:    Tobacco Use    Smoking  status: Former     Current packs/day: 0.00     Types: Electronic Cigarette, Cigarettes     Quit date:      Years since quittin.3     Passive exposure: Past    Smokeless tobacco: Never   Vaping Use    Vaping status: Every Day    Substances: Nicotine   Substance and Sexual Activity    Alcohol use: No    Drug use: No    Sexual activity: Defer       Family History:  Family History   Problem Relation Age of Onset    Hypertension Mother     Heart disease Father     Hypertension Father     Hypertension Sister     Heart attack Sister     Aneurysm Sister         brain    Drug abuse Sister     Heart attack Brother 48    Heart disease Brother     Hypertension Brother     Diabetes Brother     Breast cancer Paternal Cousin     Breast cancer Maternal Cousin        Review of Systems:  Review of Systems   Constitutional:  Negative for chills, fatigue and fever.   Respiratory:  Negative for cough, shortness of breath and wheezing.    Cardiovascular:  Negative for leg swelling.   Gastrointestinal:  Negative for abdominal pain, nausea and vomiting.   Genitourinary:  Positive for frequency, hematuria and urgency. Negative for difficulty urinating and dysuria.   Musculoskeletal:  Positive for back pain. Negative for joint swelling.   Neurological:  Negative for dizziness and headaches.   Psychiatric/Behavioral:  Negative for confusion.        Physical Exam:  Physical Exam  Constitutional:       General: She is not in acute distress.     Appearance: Normal appearance.   HENT:      Head: Normocephalic and atraumatic.      Nose: Nose normal.      Mouth/Throat:      Mouth: Mucous membranes are moist.   Eyes:      Conjunctiva/sclera: Conjunctivae normal.   Cardiovascular:      Rate and Rhythm: Normal rate.      Pulses: Normal pulses.   Pulmonary:      Effort: Pulmonary effort is normal.   Abdominal:      Palpations: Abdomen is soft.   Musculoskeletal:         General: Normal range of motion.      Cervical back: Normal range of  motion.   Skin:     General: Skin is warm.   Neurological:      General: No focal deficit present.      Mental Status: She is alert and oriented to person, place, and time.   Psychiatric:         Mood and Affect: Mood normal.         Behavior: Behavior normal.         Thought Content: Thought content normal.         Judgment: Judgment normal.           Recent Image (CT and/or KUB):   CT Abdomen and Pelvis: No results found for this or any previous visit.     CT Stone Protocol: No results found for this or any previous visit.     KUB: No results found for this or any previous visit.       Labs:  Brief Urine Lab Results       None          Hospital Outpatient Visit on 05/13/2025   Component Date Value Ref Range Status    Creatinine 05/13/2025 0.90  0.60 - 1.30 mg/dL Final    Serial Number: [id number]:  [id number]        Procedure: None  Procedures     I have reviewed and agree with the above PMH, PSH, FMH, social history, medications, allergies, and labs.     Assessment/Plan:   Problem List Items Addressed This Visit       Left ureteral stone - Primary    Relevant Medications    tamsulosin (FLOMAX) 0.4 MG capsule 24 hr capsule       Health Maintenance:   Health Maintenance Due   Topic Date Due    Pneumococcal Vaccine 50+ (1 of 2 - PCV) Never done    TDAP/TD VACCINES (1 - Tdap) Never done    HEPATITIS C SCREENING  Never done    ANNUAL WELLNESS VISIT  Never done    ZOSTER VACCINE (3 of 3) 03/06/2023    COVID-19 Vaccine (4 - 2024-25 season) 09/01/2024        Smoking Counseling: Former smoker.  Never used smokeless tobacco.    Urine Incontinence: Patient reports that she is not currently experiencing any symptoms of urinary incontinence.    Patient was given instructions and counseling regarding her condition or for health maintenance advice. Please see specific information pulled into the AVS if appropriate.    Patient Education:   Left ureteral calculus -it was discussed with the patient the presence of a distal left  UVJ calculus causing obstructive uropathy.  No other kidney stones were noted.  Patient's hematuria is most likely secondary to stone.  We discussed the various therapeutic options available including percutaneous nephrostolithotomy, ureteroscopy and extracorporeal shockwave  lithotripsy.  We discussed the risks of lithotripsy including the passage of stones leading to a 3% chance of Steinstrasse or a large string of stones in the distal ureter. In this incidence the patient was informed that a ureteroscopy is indicated for obstructing fragments.  Patient was informed of an extremely rare incidence of renal hematoma and the significance of this.  Patient was educated on percutaneous nephrostolithotomy and its use as well as the risks and benefits such as the need for postoperative hospitalization, and the risk of damage to the kidney and the remote risk of a nephrectomy.  We also discussed the use of ureteroscopy in the upper tracts and its decreased success rate to completely remove the stones likely causing stent placement leading to an additional procedure for removal.  We discussed the absolute relative indicators for intervention including the presence of sepsis and uncontrollable pain leading to need for urgent intervention.  We discussed placement of a stent if indicated and the management of the stent as well.  Given patient's symptoms are well-controlled at this time proceed forward with Flomax 0.4 mg once daily and possible trial of spontaneous passage using medications.  Did advise her symptoms worsen or go to the nearest ER or call and we will schedule her for surgical intervention including a left uteroscopy with holmium laser lithotripsy.  Did discuss today risk of this procedure above.  Otherwise we will place her back in roughly 1 month.  She verbalized understanding.    Visit Diagnoses:    ICD-10-CM ICD-9-CM   1. Left ureteral stone  N20.1 592.1     A total of 30 minutes were spent coordinating this  patient’s care in clinic today; 15 minutes of which were face-to-face with the patient, reviewing medical history and counseling on the current treatment and followup plan.  All questions were answered to patient's satisfaction.    Meds Ordered During Visit:  New Medications Ordered This Visit   Medications    tamsulosin (FLOMAX) 0.4 MG capsule 24 hr capsule     Sig: Take 1 capsule by mouth Daily.     Dispense:  30 capsule     Refill:  0       Follow Up Appointment: 1 month  No follow-ups on file.      This document has been electronically signed by Mau Vergara PA-C   May 19, 2025 17:04 EDT    Part of this note may be an electronic transcription/translation of spoken language to printed text using the Dragon Dictation System.

## 2025-05-21 ENCOUNTER — PATIENT ROUNDING (BHMG ONLY) (OUTPATIENT)
Dept: UROLOGY | Facility: CLINIC | Age: 70
End: 2025-05-21
Payer: MEDICARE

## 2025-05-25 ENCOUNTER — HOSPITAL ENCOUNTER (EMERGENCY)
Facility: HOSPITAL | Age: 70
Discharge: HOME | End: 2025-05-25
Payer: MEDICARE

## 2025-05-25 VITALS
WEIGHT: 162 LBS | HEIGHT: 65 IN | DIASTOLIC BLOOD PRESSURE: 89 MMHG | HEART RATE: 76 BPM | RESPIRATION RATE: 17 BRPM | BODY MASS INDEX: 26.99 KG/M2 | OXYGEN SATURATION: 98 % | TEMPERATURE: 98.6 F | SYSTOLIC BLOOD PRESSURE: 171 MMHG

## 2025-05-25 DIAGNOSIS — R10.9 ACUTE LEFT FLANK PAIN: ICD-10-CM

## 2025-05-25 DIAGNOSIS — N20.1 CALCULUS OF URETER: Primary | ICD-10-CM

## 2025-05-25 DIAGNOSIS — E87.6 HYPOKALEMIA: ICD-10-CM

## 2025-05-25 LAB
ALBUMIN SERPL-MCNC: 3.9 G/DL (ref 3.5–5.2)
ALBUMIN/GLOB SERPL: 1.5 G/DL
ALP SERPL-CCNC: 112 U/L (ref 39–117)
ALT SERPL W P-5'-P-CCNC: 14 U/L (ref 1–33)
ANION GAP SERPL CALCULATED.3IONS-SCNC: 9.9 MMOL/L (ref 5–15)
AST SERPL-CCNC: 21 U/L (ref 1–32)
BACTERIA UR QL AUTO: ABNORMAL /HPF
BASOPHILS # BLD AUTO: 0.05 10*3/MM3 (ref 0–0.2)
BASOPHILS NFR BLD AUTO: 0.8 % (ref 0–1.5)
BILIRUB SERPL-MCNC: 0.2 MG/DL (ref 0–1.2)
BILIRUB UR QL STRIP: NEGATIVE
BUN SERPL-MCNC: 12 MG/DL (ref 8–23)
BUN/CREAT SERPL: 14 (ref 7–25)
CALCIUM SPEC-SCNC: 9 MG/DL (ref 8.6–10.5)
CHLORIDE SERPL-SCNC: 107 MMOL/L (ref 98–107)
CLARITY UR: CLEAR
CO2 SERPL-SCNC: 26.1 MMOL/L (ref 22–29)
COLOR UR: YELLOW
CREAT SERPL-MCNC: 0.86 MG/DL (ref 0.57–1)
D-LACTATE SERPL-SCNC: 0.8 MMOL/L (ref 0.5–2)
DEPRECATED RDW RBC AUTO: 57.7 FL (ref 37–54)
EGFRCR SERPLBLD CKD-EPI 2021: 73.7 ML/MIN/1.73
EOSINOPHIL # BLD AUTO: 0.11 10*3/MM3 (ref 0–0.4)
EOSINOPHIL NFR BLD AUTO: 1.9 % (ref 0.3–6.2)
ERYTHROCYTE [DISTWIDTH] IN BLOOD BY AUTOMATED COUNT: 19.3 % (ref 12.3–15.4)
GLOBULIN UR ELPH-MCNC: 2.6 GM/DL
GLUCOSE SERPL-MCNC: 120 MG/DL (ref 65–99)
GLUCOSE UR STRIP-MCNC: NEGATIVE MG/DL
HCT VFR BLD AUTO: 34.9 % (ref 34–46.6)
HGB BLD-MCNC: 10 G/DL (ref 12–15.9)
HGB UR QL STRIP.AUTO: ABNORMAL
HOLD SPECIMEN: NORMAL
HOLD SPECIMEN: NORMAL
HYALINE CASTS UR QL AUTO: ABNORMAL /LPF
IMM GRANULOCYTES # BLD AUTO: 0.01 10*3/MM3 (ref 0–0.05)
IMM GRANULOCYTES NFR BLD AUTO: 0.2 % (ref 0–0.5)
KETONES UR QL STRIP: NEGATIVE
LEUKOCYTE ESTERASE UR QL STRIP.AUTO: ABNORMAL
LIPASE SERPL-CCNC: 37 U/L (ref 13–60)
LYMPHOCYTES # BLD AUTO: 1.44 10*3/MM3 (ref 0.7–3.1)
LYMPHOCYTES NFR BLD AUTO: 24.2 % (ref 19.6–45.3)
MCH RBC QN AUTO: 24.1 PG (ref 26.6–33)
MCHC RBC AUTO-ENTMCNC: 28.7 G/DL (ref 31.5–35.7)
MCV RBC AUTO: 84.1 FL (ref 79–97)
MONOCYTES # BLD AUTO: 0.46 10*3/MM3 (ref 0.1–0.9)
MONOCYTES NFR BLD AUTO: 7.7 % (ref 5–12)
NEUTROPHILS NFR BLD AUTO: 3.87 10*3/MM3 (ref 1.7–7)
NEUTROPHILS NFR BLD AUTO: 65.2 % (ref 42.7–76)
NITRITE UR QL STRIP: NEGATIVE
NRBC BLD AUTO-RTO: 0 /100 WBC (ref 0–0.2)
PH UR STRIP.AUTO: 7 [PH] (ref 5–8)
PLATELET # BLD AUTO: 355 10*3/MM3 (ref 140–450)
PMV BLD AUTO: 9.9 FL (ref 6–12)
POTASSIUM SERPL-SCNC: 3.4 MMOL/L (ref 3.5–5.2)
PROT SERPL-MCNC: 6.5 G/DL (ref 6–8.5)
PROT UR QL STRIP: ABNORMAL
RBC # BLD AUTO: 4.15 10*6/MM3 (ref 3.77–5.28)
RBC # UR STRIP: ABNORMAL /HPF
REF LAB TEST METHOD: ABNORMAL
SODIUM SERPL-SCNC: 143 MMOL/L (ref 136–145)
SP GR UR STRIP: <=1.005 (ref 1–1.03)
SQUAMOUS #/AREA URNS HPF: ABNORMAL /HPF
UROBILINOGEN UR QL STRIP: ABNORMAL
WBC # UR STRIP: ABNORMAL /HPF
WBC NRBC COR # BLD AUTO: 5.94 10*3/MM3 (ref 3.4–10.8)
WHOLE BLOOD HOLD COAG: NORMAL
WHOLE BLOOD HOLD SPECIMEN: NORMAL

## 2025-05-25 PROCEDURE — 83690 ASSAY OF LIPASE: CPT

## 2025-05-25 PROCEDURE — 83605 ASSAY OF LACTIC ACID: CPT

## 2025-05-25 PROCEDURE — 81001 URINALYSIS AUTO W/SCOPE: CPT

## 2025-05-25 PROCEDURE — 80053 COMPREHEN METABOLIC PANEL: CPT

## 2025-05-25 PROCEDURE — 36415 COLL VENOUS BLD VENIPUNCTURE: CPT

## 2025-05-25 PROCEDURE — 99283 EMERGENCY DEPT VISIT LOW MDM: CPT

## 2025-05-25 PROCEDURE — 85025 COMPLETE CBC W/AUTO DIFF WBC: CPT

## 2025-05-25 RX ORDER — POTASSIUM CHLORIDE 750 MG/1
10 TABLET, EXTENDED RELEASE ORAL DAILY
Qty: 5 TABLET | Refills: 0 | Status: SHIPPED | OUTPATIENT
Start: 2025-05-25

## 2025-05-25 RX ORDER — HYDROCODONE BITARTRATE AND ACETAMINOPHEN 5; 325 MG/1; MG/1
1 TABLET ORAL EVERY 8 HOURS PRN
Qty: 14 TABLET | Refills: 0 | Status: SHIPPED | OUTPATIENT
Start: 2025-05-25

## 2025-05-25 RX ORDER — ONDANSETRON 4 MG/1
4 TABLET, FILM COATED ORAL EVERY 8 HOURS PRN
Qty: 20 TABLET | Refills: 0 | Status: SHIPPED | OUTPATIENT
Start: 2025-05-25

## 2025-05-25 RX ORDER — SODIUM CHLORIDE 0.9 % (FLUSH) 0.9 %
10 SYRINGE (ML) INJECTION AS NEEDED
Status: DISCONTINUED | OUTPATIENT
Start: 2025-05-25 | End: 2025-05-25 | Stop reason: HOSPADM

## 2025-05-25 RX ORDER — HYDROCODONE BITARTRATE AND ACETAMINOPHEN 5; 325 MG/1; MG/1
1 TABLET ORAL EVERY 8 HOURS PRN
Refills: 0 | Status: CANCELLED | OUTPATIENT
Start: 2025-05-25

## 2025-05-25 RX ORDER — SULFAMETHOXAZOLE AND TRIMETHOPRIM 800; 160 MG/1; MG/1
1 TABLET ORAL 2 TIMES DAILY
Qty: 20 TABLET | Refills: 0 | Status: SHIPPED | OUTPATIENT
Start: 2025-05-25

## 2025-05-25 NOTE — DISCHARGE INSTRUCTIONS
Be sure to continue the Flomax that you are given by the urologist to help facilitate passage of stone.  I will prescribe some medication for nausea and vomiting.  Additionally, you will be discharged home with some stronger pain medication should your symptoms return.  If you begin to have fever, worsening pain, or pain is not controlled with pain medication return to the emergency department for further evaluation and treatment.

## 2025-05-25 NOTE — ED PROVIDER NOTES
Subjective   History of Present Illness  The patient is a 69-year-old female presents the emergency department [facility] for evaluation of left flank pain is radiating to her back.  She is followed by Dr. Faulkner who did an ultrasound of her kidneys about 3 weeks ago.  She states at that time she was told that she had some swelling of the right kidney and a probable mass.  Therefore she states that her primary care ordered a CT scan of the abdomen and pelvis with contrast.  She states that the CT scan did note some swelling in the right renal pelvis and ureter but there was no stones seen.  She states that she was subsequently referred to the urologist here in [place] for evaluation of the right renal pelvis enlargement.  She states at that appointment they reviewed her CT scan that had previously been ordered by Dr. Faulkner.  They are reading states that there is a 4 to 5 mm stone in the left UVJ junction that was not previously noted.  The patient states that 3 to 4 hours prior to arrival her pain became much worse and she felt was best to come to the emergency department for further treatment.     History provided by:  Patient and relative      Review of Systems   Constitutional: Negative.  Negative for fever.   HENT: Negative.     Respiratory: Negative.     Cardiovascular: Negative.  Negative for chest pain.   Gastrointestinal:  Positive for abdominal pain.   Endocrine: Negative.    Genitourinary:  Positive for flank pain, frequency and urgency. Negative for dysuria.   Skin: Negative.    Neurological: Negative.    Psychiatric/Behavioral: Negative.     All other systems reviewed and are negative.      Past Medical History:   Diagnosis Date    Arm fracture     Asthma     Atrial fibrillation     GERD (gastroesophageal reflux disease)     Hyperlipidemia     Hypertension     Osteoporosis     Status post coronary angiogram 11/12/2024       No Known Allergies    Past Surgical History:   Procedure Laterality Date    BREAST  BIOPSY      CARDIAC CATHETERIZATION N/A 10/19/2022    Procedure: Left Heart Cath;  Surgeon: Piotr Edwards DO;  Location:  [facility];  Service: Cardiology;  Laterality: N/A;    CARDIAC CATHETERIZATION N/A 2024    Procedure: Left Heart Cath;  Surgeon: Carola Carrillo MD;  Location:  [facility];  Service: Cardiovascular;  Laterality: N/A;    CARPAL TUNNEL RELEASE Bilateral     CATARACT EXTRACTION      CHOLECYSTECTOMY      COLONOSCOPY N/A 3/15/2024    Procedure: COLONOSCOPY;  Surgeon: Rufino Cohen MD;  Location: [facility] OR;  Service: Gastroenterology;  Laterality: N/A;    ENDOSCOPY N/A 3/8/2024    Procedure: ESOPHAGOGASTRODUODENOSCOPY WITH ANESTHESIA;  Surgeon: Rufino Cohen MD;  Location: [facility] OR;  Service: Gastroenterology;  Laterality: N/A;    ESOPHAGOSCOPY W/ DILATION      HYSTERECTOMY         Family History   Problem Relation Age of Onset    Hypertension Mother     Heart disease Father     Hypertension Father     Hypertension Sister     Heart attack Sister     Aneurysm Sister         brain    Drug abuse Sister     Heart attack Brother 48    Heart disease Brother     Hypertension Brother     Diabetes Brother     Breast cancer Paternal Cousin     Breast cancer Maternal Cousin        Social History     Socioeconomic History    Marital status:    Tobacco Use    Smoking status: Former     Current packs/day: 0.00     Types: Electronic Cigarette, Cigarettes     Quit date:      Years since quittin.4     Passive exposure: Past    Smokeless tobacco: Never   Vaping Use    Vaping status: Every Day    Substances: Nicotine   Substance and Sexual Activity    Alcohol use: No    Drug use: No    Sexual activity: Defer           Objective   Physical Exam  Vitals and nursing note reviewed.   Constitutional:       General: She is not in acute distress.     Appearance: She is well-developed. She is not diaphoretic.   HENT:      Head: Normocephalic and atraumatic.       Right Ear: External ear normal.      Left Ear: External ear normal.      Nose: Nose normal.   Eyes:      Conjunctiva/sclera: Conjunctivae normal.      Pupils: Pupils are equal, round, and reactive to light.   Neck:      Vascular: No JVD.      Trachea: No tracheal deviation.   Cardiovascular:      Rate and Rhythm: Normal rate and regular rhythm.      Heart sounds: Normal heart sounds. No murmur heard.  Pulmonary:      Effort: Pulmonary effort is normal. No respiratory distress.      Breath sounds: Normal breath sounds. No wheezing.   Abdominal:      General: Bowel sounds are normal.      Palpations: Abdomen is soft.      Tenderness: There is no abdominal tenderness.   Musculoskeletal:         General: No deformity. Normal range of motion.      Cervical back: Normal range of motion and neck supple.   Skin:     General: Skin is warm and dry.      Coloration: Skin is not pale.      Findings: No erythema or rash.   Neurological:      Mental Status: She is alert and oriented to person, place, and time.      Cranial Nerves: No cranial nerve deficit.   Psychiatric:         Behavior: Behavior normal.         Thought Content: Thought content normal.         Procedures           ED Course                       Results for orders placed or performed during the hospital encounter of 05/25/25   Urinalysis With Culture If Indicated - Urine, Clean Catch    Collection Time: 05/25/25 12:46 AM    Specimen: Urine, Clean Catch   Result Value Ref Range    Color, UA Yellow Yellow, Straw    Appearance, UA Clear Clear    pH, UA 7.0 5.0 - 8.0    Specific Gravity, UA <=1.005 1.005 - 1.030    Glucose, UA Negative Negative    Ketones, UA Negative Negative    Bilirubin, UA Negative Negative    Blood, UA Large (3+) (A) Negative    Protein, UA Trace (A) Negative    Leuk Esterase, UA Trace (A) Negative    Nitrite, UA Negative Negative    Urobilinogen, UA 0.2 E.U./dL 0.2 - 1.0 E.U./dL   Urinalysis, Microscopic Only - Urine, Clean Catch     Collection Time: 05/25/25 12:46 AM    Specimen: Urine, Clean Catch   Result Value Ref Range    RBC, UA 11-20 (A) None Seen, 0-2 /HPF    WBC, UA 0-2 None Seen, 0-2 /HPF    Bacteria, UA None Seen None Seen /HPF    Squamous Epithelial Cells, UA 0-2 None Seen, 0-2 /HPF    Hyaline Casts, UA None Seen None Seen /LPF    Methodology Automated Microscopy    Comprehensive Metabolic Panel    Collection Time: 05/25/25  1:45 AM    Specimen: Arm, Right; Blood   Result Value Ref Range    Glucose 120 (H) 65 - 99 mg/dL    BUN 12 8 - 23 mg/dL    Creatinine 0.86 0.57 - 1.00 mg/dL    Sodium 143 136 - 145 mmol/L    Potassium 3.4 (L) 3.5 - 5.2 mmol/L    Chloride 107 98 - 107 mmol/L    CO2 26.1 22.0 - 29.0 mmol/L    Calcium 9.0 8.6 - 10.5 mg/dL    Total Protein 6.5 6.0 - 8.5 g/dL    Albumin 3.9 3.5 - 5.2 g/dL    ALT (SGPT) 14 1 - 33 U/L    AST (SGOT) 21 1 - 32 U/L    Alkaline Phosphatase 112 39 - 117 U/L    Total Bilirubin 0.2 0.0 - 1.2 mg/dL    Globulin 2.6 gm/dL    A/G Ratio 1.5 g/dL    BUN/Creatinine Ratio 14.0 7.0 - 25.0    Anion Gap 9.9 5.0 - 15.0 mmol/L    eGFR 73.7 >60.0 mL/min/1.73   Lipase    Collection Time: 05/25/25  1:45 AM    Specimen: Arm, Right; Blood   Result Value Ref Range    Lipase 37 13 - 60 U/L   Lactic Acid, Plasma    Collection Time: 05/25/25  1:45 AM    Specimen: Arm, Right; Blood   Result Value Ref Range    Lactate 0.8 0.5 - 2.0 mmol/L   CBC Auto Differential    Collection Time: 05/25/25  1:45 AM    Specimen: Arm, Right; Blood   Result Value Ref Range    WBC 5.94 3.40 - 10.80 10*3/mm3    RBC 4.15 3.77 - 5.28 10*6/mm3    Hemoglobin 10.0 (L) 12.0 - 15.9 g/dL    Hematocrit 34.9 34.0 - 46.6 %    MCV 84.1 79.0 - 97.0 fL    MCH 24.1 (L) 26.6 - 33.0 pg    MCHC 28.7 (L) 31.5 - 35.7 g/dL    RDW 19.3 (H) 12.3 - 15.4 %    RDW-SD 57.7 (H) 37.0 - 54.0 fl    MPV 9.9 6.0 - 12.0 fL    Platelets 355 140 - 450 10*3/mm3    Neutrophil % 65.2 42.7 - 76.0 %    Lymphocyte % 24.2 19.6 - 45.3 %    Monocyte % 7.7 5.0 - 12.0 %    Eosinophil  % 1.9 0.3 - 6.2 %    Basophil % 0.8 0.0 - 1.5 %    Immature Grans % 0.2 0.0 - 0.5 %    Neutrophils, Absolute 3.87 1.70 - 7.00 10*3/mm3    Lymphocytes, Absolute 1.44 0.70 - 3.10 10*3/mm3    Monocytes, Absolute 0.46 0.10 - 0.90 10*3/mm3    Eosinophils, Absolute 0.11 0.00 - 0.40 10*3/mm3    Basophils, Absolute 0.05 0.00 - 0.20 10*3/mm3    Immature Grans, Absolute 0.01 0.00 - 0.05 10*3/mm3    nRBC 0.0 0.0 - 0.2 /100 WBC   Green Top (Gel)    Collection Time: 05/25/25  1:45 AM   Result Value Ref Range    Extra Tube Hold for add-ons.    Lavender Top    Collection Time: 05/25/25  1:45 AM   Result Value Ref Range    Extra Tube hold for add-on    Gold Top - SST    Collection Time: 05/25/25  1:45 AM   Result Value Ref Range    Extra Tube Hold for add-ons.    Light Blue Top    Collection Time: 05/25/25  1:45 AM   Result Value Ref Range    Extra Tube Hold for add-ons.      No radiology results for the last day                                    Medical Decision Making    MDM:    Escalation of care including admission/observation considered    - Discussions of management with other providers: None.    - Discussed/reviewed with Radiology regarding test interpretation    - Independent interpretation: Labs    - Additional patient history obtained from: None    - Review of external non-ED record (if available):  Prior Inpt record, Office record, Outpt record, Prior Outpt labs, PCP record, Outside ED record, Other    - Chronic conditions affecting care: See HPI and medical Hx.    - Social Determinants of health significantly affecting care: None        Medical Decision Making Discussion:    History of Present Illness  The patient is a 69-year-old female presents the emergency department [facility] for evaluation of left flank pain is radiating to her back.  She is followed by Dr. Faulkner who did an ultrasound of her kidneys about 3 weeks ago.  She states at that time she was told that she had some swelling of the right kidney and a  probable mass.  Therefore she states that her primary care ordered a CT scan of the abdomen and pelvis with contrast.  She states that the CT scan did note some swelling in the right renal pelvis and ureter but there was no stones seen.  She states that she was subsequently referred to the urologist here in [place] for evaluation of the right renal pelvis enlargement.  She states at that appointment they reviewed her CT scan that had previously been ordered by Dr. Faulkner.  They are reading states that there is a 4 to 5 mm stone in the left UVJ junction that was not previously noted.  The patient states that 3 to 4 hours prior to arrival her pain became much worse and she felt was best to come to the emergency department for further treatment.     History provided by:  Patient and relative      The patient has been given very strict return precautions to return to the emergency department should there be any acute change or worsening of their condition.  I have explained my findings and the patient has expressed understanding to me.  I explained that the work-up performed in the ED has been based on the specific complaint and concern, as the nature of emergency medicine is complaint driven and they understand that new symptoms may arise.  I have told them that, should there be any new symptoms, worsening or changing symptoms, a new work-up may be indicated that they are encouraged to return to the emergency department or promptly contact their primary care physician. We have employed a shared decision-making process as the discussion of their disposition.  The patient has been educated as to the nature of the visit, the tests and work-up performed and the findings from today's visit. At this time, there does not appear to be any acute emergent process that necessitates admission to the hospital, however, the patient understands that this can change unexpectedly. At this time, the patient is stable for discharge home  and agrees to follow-up with her primary care physician in the next 24 to 48 hours or earlier should they be able to obtain an appointment.    The patient was counseled regarding diagnostic results and treatment plan and patient has indicated understanding of these instructions.     Problems Addressed:  Acute left flank pain: complicated acute illness or injury  Calculus of ureter: complicated acute illness or injury  Hypokalemia: complicated acute illness or injury    Amount and/or Complexity of Data Reviewed  External Data Reviewed: labs.    Risk  Prescription drug management.        Final diagnoses:   None       ED Disposition  ED Disposition       None            No follow-up provider specified.       Medication List      No changes were made to your prescriptions during this visit.            Juvencio Shea PA-C  05/25/25 0706

## 2025-05-27 ENCOUNTER — OFFICE VISIT (OUTPATIENT)
Dept: UROLOGY | Facility: CLINIC | Age: 70
End: 2025-05-27
Payer: MEDICARE

## 2025-05-27 VITALS
BODY MASS INDEX: 28.29 KG/M2 | SYSTOLIC BLOOD PRESSURE: 171 MMHG | WEIGHT: 169.8 LBS | DIASTOLIC BLOOD PRESSURE: 83 MMHG | HEART RATE: 70 BPM | HEIGHT: 65 IN

## 2025-05-27 DIAGNOSIS — N20.1 LEFT URETERAL STONE: Primary | ICD-10-CM

## 2025-05-27 NOTE — PROGRESS NOTES
Chief Complaint:      Chief Complaint   Patient presents with    Kidney Stone       HPI:   69 year old white female with a left distal ureteral calculus causing significant pain is desirous of surgical intervention.  Ureteral calculus-patient has been diagnosed with a ureteral calculus.  We have discussed the various parameters regarding spontaneous passage including the notion that a 2 mm stone has a high likelihood of spontaneous passage versus a larger stone being caught up in the upper areas of the urinary tract.  We also discussed the medical management of stone disease and the use of medical expulsive therapy in the form of Flomax.  This is used in an off label setting.  We discussed the indicators for intervention including  absolute indicators such as sepsis and uncontrollable severe pain, as well as the relative indicators of moderate pain that is well-controlled with various analgesia.  I also talked about nonoperative management including ambulation and increasing fluids and hot tub as being an effective adjuncts in the treatment of a ureteral stone.    Past Medical History:     Past Medical History:   Diagnosis Date    Arm fracture     Asthma     Atrial fibrillation     GERD (gastroesophageal reflux disease)     Hyperlipidemia     Hypertension     Osteoporosis     Status post coronary angiogram 11/12/2024       Current Meds:     Current Outpatient Medications   Medication Sig Dispense Refill    amitriptyline (ELAVIL) 25 MG tablet TAKE 1 TO 2 TABLETS BY MOUTH EVERY DAY AT BEDTIME AS NEEDED      apixaban (ELIQUIS) 5 MG tablet tablet Take 1 tablet by mouth 2 (Two) Times a Day. 60 tablet 5    atorvastatin (LIPITOR) 80 MG tablet TAKE 1 TABLET DAILY 90 tablet 3    flecainide (TAMBOCOR) 100 MG tablet Take 1 tablet by mouth 2 (Two) Times a Day. 60 tablet 5    HYDROcodone-acetaminophen (NORCO) 5-325 MG per tablet Take 1 tablet by mouth Every 8 (Eight) Hours As Needed for Severe Pain. 14 tablet 0    isosorbide  mononitrate (IMDUR) 60 MG 24 hr tablet Take 1 tablet by mouth Daily. 30 tablet 5    losartan-hydrochlorothiazide (HYZAAR) 100-12.5 MG per tablet Take 0.5 tablets by mouth Daily.      meloxicam (MOBIC) 15 MG tablet Take 1 tablet by mouth Daily.      metoprolol tartrate (LOPRESSOR) 25 MG tablet Take 1 tablet by mouth 2 (Two) Times a Day.      nitroglycerin (NITROSTAT) 0.4 MG SL tablet 1 under the tongue as needed for angina, may repeat q5mins for up three doses 25 tablet 2    ondansetron (ZOFRAN) 4 MG tablet Take 1 tablet by mouth Every 8 (Eight) Hours As Needed for Nausea or Vomiting. 20 tablet 0    pantoprazole (PROTONIX) 40 MG EC tablet Take 1 tablet by mouth Daily.      potassium chloride 10 MEQ CR tablet Take 1 tablet by mouth Daily. 5 tablet 0    sulfamethoxazole-trimethoprim (BACTRIM DS,SEPTRA DS) 800-160 MG per tablet Take 1 tablet by mouth 2 (Two) Times a Day. 20 tablet 0    tamsulosin (FLOMAX) 0.4 MG capsule 24 hr capsule Take 1 capsule by mouth Daily. 30 capsule 0     No current facility-administered medications for this visit.        Allergies:      No Known Allergies     Past Surgical History:     Past Surgical History:   Procedure Laterality Date    BREAST BIOPSY      CARDIAC CATHETERIZATION N/A 10/19/2022    Procedure: Left Heart Cath;  Surgeon: Piotr Edwards DO;  Location: [facility] INVASIVE LOCATION;  Service: Cardiology;  Laterality: N/A;    CARDIAC CATHETERIZATION N/A 11/12/2024    Procedure: Left Heart Cath;  Surgeon: Carola Carrillo MD;  Location: [facility] INVASIVE LOCATION;  Service: Cardiovascular;  Laterality: N/A;    CARPAL TUNNEL RELEASE Bilateral     CATARACT EXTRACTION      CHOLECYSTECTOMY      COLONOSCOPY N/A 3/15/2024    Procedure: COLONOSCOPY;  Surgeon: Rufino Cohen MD;  Location: [facility] OR;  Service: Gastroenterology;  Laterality: N/A;    ENDOSCOPY N/A 3/8/2024    Procedure: ESOPHAGOGASTRODUODENOSCOPY WITH ANESTHESIA;  Surgeon: Rufino Cohen MD;  Location: [facility];   Service: Gastroenterology;  Laterality: N/A;    ESOPHAGOSCOPY W/ DILATION      HYSTERECTOMY         Social History:     Social History     Socioeconomic History    Marital status:    Tobacco Use    Smoking status: Former     Current packs/day: 0.00     Types: Electronic Cigarette, Cigarettes     Quit date:      Years since quittin.4     Passive exposure: Past    Smokeless tobacco: Never   Vaping Use    Vaping status: Every Day    Substances: Nicotine   Substance and Sexual Activity    Alcohol use: No    Drug use: No    Sexual activity: Defer       Family History:     Family History   Problem Relation Age of Onset    Hypertension Mother     Heart disease Father     Hypertension Father     Hypertension Sister     Heart attack Sister     Aneurysm Sister         brain    Drug abuse Sister     Heart attack Brother 48    Heart disease Brother     Hypertension Brother     Diabetes Brother     Breast cancer Paternal Cousin     Breast cancer Maternal Cousin        Review of Systems:     Review of Systems   Constitutional: Negative.  Negative for activity change, appetite change, chills, diaphoresis, fatigue and unexpected weight change.   HENT:  Negative for congestion, dental problem, drooling, ear discharge, ear pain, facial swelling, hearing loss, mouth sores, nosebleeds, postnasal drip, rhinorrhea, sinus pressure, sneezing, sore throat, tinnitus, trouble swallowing and voice change.    Eyes: Negative.  Negative for photophobia, pain, discharge, redness, itching and visual disturbance.   Respiratory: Negative.  Negative for apnea, cough, choking, chest tightness, shortness of breath, wheezing and stridor.    Cardiovascular: Negative.  Negative for chest pain, palpitations and leg swelling.   Gastrointestinal: Negative.  Negative for abdominal distention, abdominal pain, anal bleeding, blood in stool, constipation, diarrhea, nausea, rectal pain and vomiting.   Endocrine: Negative.  Negative for cold  intolerance, heat intolerance, polydipsia, polyphagia and polyuria.   Musculoskeletal: Negative.  Negative for arthralgias, back pain, gait problem, joint swelling, myalgias, neck pain and neck stiffness.   Skin: Negative.  Negative for color change, pallor, rash and wound.   Allergic/Immunologic: Negative.  Negative for environmental allergies, food allergies and immunocompromised state.   Neurological: Negative.  Negative for dizziness, tremors, seizures, syncope, facial asymmetry, speech difficulty, weakness, light-headedness, numbness and headaches.   Hematological: Negative.  Negative for adenopathy. Does not bruise/bleed easily.   Psychiatric/Behavioral:  Negative for agitation, behavioral problems, confusion, decreased concentration, dysphoric mood, hallucinations, self-injury, sleep disturbance and suicidal ideas. The patient is not nervous/anxious and is not hyperactive.    All other systems reviewed and are negative.      Physical Exam:     Physical Exam  Constitutional:       Appearance: She is well-developed.   HENT:      Head: Normocephalic and atraumatic.      Right Ear: External ear normal.      Left Ear: External ear normal.   Eyes:      Conjunctiva/sclera: Conjunctivae normal.      Pupils: Pupils are equal, round, and reactive to light.   Cardiovascular:      Rate and Rhythm: Normal rate and regular rhythm.      Heart sounds: Normal heart sounds.   Pulmonary:      Effort: Pulmonary effort is normal.      Breath sounds: Normal breath sounds.   Abdominal:      General: Bowel sounds are normal. There is no distension.      Palpations: Abdomen is soft. There is no mass.      Tenderness: There is no abdominal tenderness. There is no guarding or rebound.   Genitourinary:     Vagina: No vaginal discharge.   Musculoskeletal:         General: Normal range of motion.   Skin:     General: Skin is warm and dry.   Neurological:      Mental Status: She is alert.      Deep Tendon Reflexes: Reflexes are normal and  symmetric.   Psychiatric:         Behavior: Behavior normal.         Thought Content: Thought content normal.         Judgment: Judgment normal.         I have reviewed the following portions of the patient's history: Allergies, current medications, past family history, past medical history, past social history, past surgical history, problem list, and ROS and confirm it is accurate.    Recent Image (CT and/or KUB):      CT Abdomen and Pelvis: No results found for this or any previous visit.       CT Stone Protocol: No results found for this or any previous visit.       KUB: No results found for this or any previous visit.       Labs (past 3 months):      Admission on 05/25/2025, Discharged on 05/25/2025   Component Date Value Ref Range Status    Glucose 05/25/2025 120 (H)  65 - 99 mg/dL Final    BUN 05/25/2025 12  8 - 23 mg/dL Final    Creatinine 05/25/2025 0.86  0.57 - 1.00 mg/dL Final    Sodium 05/25/2025 143  136 - 145 mmol/L Final    Potassium 05/25/2025 3.4 (L)  3.5 - 5.2 mmol/L Final    Chloride 05/25/2025 107  98 - 107 mmol/L Final    CO2 05/25/2025 26.1  22.0 - 29.0 mmol/L Final    Calcium 05/25/2025 9.0  8.6 - 10.5 mg/dL Final    Total Protein 05/25/2025 6.5  6.0 - 8.5 g/dL Final    Albumin 05/25/2025 3.9  3.5 - 5.2 g/dL Final    ALT (SGPT) 05/25/2025 14  1 - 33 U/L Final    AST (SGOT) 05/25/2025 21  1 - 32 U/L Final    Alkaline Phosphatase 05/25/2025 112  39 - 117 U/L Final    Total Bilirubin 05/25/2025 0.2  0.0 - 1.2 mg/dL Final    Globulin 05/25/2025 2.6  gm/dL Final    A/G Ratio 05/25/2025 1.5  g/dL Final    BUN/Creatinine Ratio 05/25/2025 14.0  7.0 - 25.0 Final    Anion Gap 05/25/2025 9.9  5.0 - 15.0 mmol/L Final    eGFR 05/25/2025 73.7  >60.0 mL/min/1.73 Final    Lipase 05/25/2025 37  13 - 60 U/L Final    Lactate 05/25/2025 0.8  0.5 - 2.0 mmol/L Final    Color, UA 05/25/2025 Yellow  Yellow, Straw Final    Appearance, UA 05/25/2025 Clear  Clear Final    pH, UA 05/25/2025 7.0  5.0 - 8.0 Final     Specific Gravity, UA 05/25/2025 <=1.005  1.005 - 1.030 Final    Glucose, UA 05/25/2025 Negative  Negative Final    Ketones, UA 05/25/2025 Negative  Negative Final    Bilirubin, UA 05/25/2025 Negative  Negative Final    Blood, UA 05/25/2025 Large (3+) (A)  Negative Final    Protein, UA 05/25/2025 Trace (A)  Negative Final    Leuk Esterase, UA 05/25/2025 Trace (A)  Negative Final    Nitrite, UA 05/25/2025 Negative  Negative Final    Urobilinogen, UA 05/25/2025 0.2 E.U./dL  0.2 - 1.0 E.U./dL Final    Extra Tube 05/25/2025 Hold for add-ons.   Final    Auto resulted.    Extra Tube 05/25/2025 hold for add-on   Final    Auto resulted    Extra Tube 05/25/2025 Hold for add-ons.   Final    Auto resulted.    Extra Tube 05/25/2025 Hold for add-ons.   Final    Auto resulted    WBC 05/25/2025 5.94  3.40 - 10.80 10*3/mm3 Final    RBC 05/25/2025 4.15  3.77 - 5.28 10*6/mm3 Final    Hemoglobin 05/25/2025 10.0 (L)  12.0 - 15.9 g/dL Final    Hematocrit 05/25/2025 34.9  34.0 - 46.6 % Final    MCV 05/25/2025 84.1  79.0 - 97.0 fL Final    MCH 05/25/2025 24.1 (L)  26.6 - 33.0 pg Final    MCHC 05/25/2025 28.7 (L)  31.5 - 35.7 g/dL Final    RDW 05/25/2025 19.3 (H)  12.3 - 15.4 % Final    RDW-SD 05/25/2025 57.7 (H)  37.0 - 54.0 fl Final    MPV 05/25/2025 9.9  6.0 - 12.0 fL Final    Platelets 05/25/2025 355  140 - 450 10*3/mm3 Final    Neutrophil % 05/25/2025 65.2  42.7 - 76.0 % Final    Lymphocyte % 05/25/2025 24.2  19.6 - 45.3 % Final    Monocyte % 05/25/2025 7.7  5.0 - 12.0 % Final    Eosinophil % 05/25/2025 1.9  0.3 - 6.2 % Final    Basophil % 05/25/2025 0.8  0.0 - 1.5 % Final    Immature Grans % 05/25/2025 0.2  0.0 - 0.5 % Final    Neutrophils, Absolute 05/25/2025 3.87  1.70 - 7.00 10*3/mm3 Final    Lymphocytes, Absolute 05/25/2025 1.44  0.70 - 3.10 10*3/mm3 Final    Monocytes, Absolute 05/25/2025 0.46  0.10 - 0.90 10*3/mm3 Final    Eosinophils, Absolute 05/25/2025 0.11  0.00 - 0.40 10*3/mm3 Final    Basophils, Absolute 05/25/2025 0.05   0.00 - 0.20 10*3/mm3 Final    Immature Grans, Absolute 05/25/2025 0.01  0.00 - 0.05 10*3/mm3 Final    nRBC 05/25/2025 0.0  0.0 - 0.2 /100 WBC Final    RBC, UA 05/25/2025 11-20 (A)  None Seen, 0-2 /HPF Final    WBC, UA 05/25/2025 0-2  None Seen, 0-2 /HPF Final    Urine culture not indicated.    Bacteria, UA 05/25/2025 None Seen  None Seen /HPF Final    Squamous Epithelial Cells, UA 05/25/2025 0-2  None Seen, 0-2 /HPF Final    Hyaline Casts, UA 05/25/2025 None Seen  None Seen /LPF Final    Methodology 05/25/2025 Automated Microscopy   Final   Hospital Outpatient Visit on 05/13/2025   Component Date Value Ref Range Status    Creatinine 05/13/2025 0.90  0.60 - 1.30 mg/dL Final    Serial Number: [id number]:  [id number]        Procedure:       Assessment/Plan:   Ureteral calculus-patient has been diagnosed with a ureteral calculus.  We have discussed the various parameters regarding spontaneous passage including the notion that a 2 mm stone has a high likelihood of spontaneous passage versus a larger stone being caught up in the upper areas of the urinary tract.  We also discussed the medical management of stone disease and the use of medical expulsive therapy in the form of Flomax.  This is used in an off label setting.  We discussed the indicators for intervention including  absolute indicators such as sepsis and uncontrollable severe pain, as well as the relative indicators of moderate pain that is well-controlled with various analgesia.  I also talked about nonoperative management including ambulation and increasing fluids and hot tub as being an effective adjuncts in the treatment of a ureteral stone.          This document has been electronically signed by ESTEE ROBBINS MD May 27, 2025 10:14 EDT    Dictated Utilizing Dragon Dictation: Part of this note may be an electronic transcription/translation of spoken language to printed text using the Dragon Dictation System.

## 2025-05-28 ENCOUNTER — APPOINTMENT (OUTPATIENT)
Dept: GENERAL RADIOLOGY | Facility: HOSPITAL | Age: 70
End: 2025-05-28
Payer: MEDICARE

## 2025-05-28 ENCOUNTER — ANESTHESIA EVENT (OUTPATIENT)
Dept: PERIOP | Facility: HOSPITAL | Age: 70
End: 2025-05-28
Payer: MEDICARE

## 2025-05-28 ENCOUNTER — HOSPITAL ENCOUNTER (OUTPATIENT)
Facility: HOSPITAL | Age: 70
Discharge: HOME | End: 2025-05-28
Attending: UROLOGY | Admitting: UROLOGY
Payer: MEDICARE

## 2025-05-28 ENCOUNTER — ANESTHESIA (OUTPATIENT)
Dept: PERIOP | Facility: HOSPITAL | Age: 70
End: 2025-05-28
Payer: MEDICARE

## 2025-05-28 VITALS
BODY MASS INDEX: 28.16 KG/M2 | DIASTOLIC BLOOD PRESSURE: 81 MMHG | HEART RATE: 78 BPM | HEIGHT: 65 IN | OXYGEN SATURATION: 95 % | RESPIRATION RATE: 18 BRPM | WEIGHT: 169 LBS | TEMPERATURE: 97.3 F | SYSTOLIC BLOOD PRESSURE: 150 MMHG

## 2025-05-28 DIAGNOSIS — N20.1 LEFT URETERAL STONE: ICD-10-CM

## 2025-05-28 PROCEDURE — 82365 CALCULUS SPECTROSCOPY: CPT | Performed by: UROLOGY

## 2025-05-28 PROCEDURE — 25010000002 FAMOTIDINE (PF) 20 MG/2ML SOLUTION: Performed by: NURSE ANESTHETIST, CERTIFIED REGISTERED

## 2025-05-28 PROCEDURE — 25010000002 PROPOFOL 200 MG/20ML EMULSION: Performed by: NURSE ANESTHETIST, CERTIFIED REGISTERED

## 2025-05-28 PROCEDURE — 74420 UROGRAPHY RTRGR +-KUB: CPT | Performed by: UROLOGY

## 2025-05-28 PROCEDURE — 52353 CYSTOURETERO W/LITHOTRIPSY: CPT | Performed by: UROLOGY

## 2025-05-28 PROCEDURE — 25010000002 ONDANSETRON PER 1 MG: Performed by: NURSE ANESTHETIST, CERTIFIED REGISTERED

## 2025-05-28 PROCEDURE — 25810000003 LACTATED RINGERS PER 1000 ML: Performed by: ANESTHESIOLOGY

## 2025-05-28 PROCEDURE — 25010000002 GENTAMICIN PER 80 MG: Performed by: UROLOGY

## 2025-05-28 PROCEDURE — 76000 FLUOROSCOPY <1 HR PHYS/QHP: CPT

## 2025-05-28 PROCEDURE — 25510000001 IOPAMIDOL 61 % SOLUTION: Performed by: UROLOGY

## 2025-05-28 PROCEDURE — 25010000002 LIDOCAINE PF 2% 2 % SOLUTION: Performed by: NURSE ANESTHETIST, CERTIFIED REGISTERED

## 2025-05-28 PROCEDURE — 25010000002 FENTANYL CITRATE (PF) 50 MCG/ML SOLUTION: Performed by: NURSE ANESTHETIST, CERTIFIED REGISTERED

## 2025-05-28 PROCEDURE — 25010000002 GLYCOPYRROLATE 0.4 MG/2ML SOLUTION: Performed by: NURSE ANESTHETIST, CERTIFIED REGISTERED

## 2025-05-28 RX ORDER — FAMOTIDINE 10 MG/ML
INJECTION, SOLUTION INTRAVENOUS AS NEEDED
Status: DISCONTINUED | OUTPATIENT
Start: 2025-05-28 | End: 2025-05-28 | Stop reason: SURG

## 2025-05-28 RX ORDER — SODIUM CHLORIDE 9 MG/ML
40 INJECTION, SOLUTION INTRAVENOUS AS NEEDED
Status: DISCONTINUED | OUTPATIENT
Start: 2025-05-28 | End: 2025-05-28 | Stop reason: HOSPADM

## 2025-05-28 RX ORDER — SODIUM CHLORIDE, SODIUM LACTATE, POTASSIUM CHLORIDE, CALCIUM CHLORIDE 600; 310; 30; 20 MG/100ML; MG/100ML; MG/100ML; MG/100ML
125 INJECTION, SOLUTION INTRAVENOUS ONCE
Status: COMPLETED | OUTPATIENT
Start: 2025-05-28 | End: 2025-05-28

## 2025-05-28 RX ORDER — ONDANSETRON 2 MG/ML
INJECTION INTRAMUSCULAR; INTRAVENOUS AS NEEDED
Status: DISCONTINUED | OUTPATIENT
Start: 2025-05-28 | End: 2025-05-28 | Stop reason: SURG

## 2025-05-28 RX ORDER — HYDROCODONE BITARTRATE AND ACETAMINOPHEN 10; 325 MG/1; MG/1
1 TABLET ORAL EVERY 6 HOURS PRN
Qty: 16 TABLET | Refills: 0 | Status: SHIPPED | OUTPATIENT
Start: 2025-05-28

## 2025-05-28 RX ORDER — MEPERIDINE HYDROCHLORIDE 25 MG/ML
12.5 INJECTION INTRAMUSCULAR; INTRAVENOUS; SUBCUTANEOUS
Status: DISCONTINUED | OUTPATIENT
Start: 2025-05-28 | End: 2025-05-28 | Stop reason: HOSPADM

## 2025-05-28 RX ORDER — SODIUM CHLORIDE 0.9 % (FLUSH) 0.9 %
10 SYRINGE (ML) INJECTION EVERY 12 HOURS SCHEDULED
Status: DISCONTINUED | OUTPATIENT
Start: 2025-05-28 | End: 2025-05-28 | Stop reason: HOSPADM

## 2025-05-28 RX ORDER — PROPOFOL 10 MG/ML
INJECTION, EMULSION INTRAVENOUS AS NEEDED
Status: DISCONTINUED | OUTPATIENT
Start: 2025-05-28 | End: 2025-05-28 | Stop reason: SURG

## 2025-05-28 RX ORDER — LIDOCAINE HYDROCHLORIDE 20 MG/ML
JELLY TOPICAL AS NEEDED
Status: DISCONTINUED | OUTPATIENT
Start: 2025-05-28 | End: 2025-05-28 | Stop reason: HOSPADM

## 2025-05-28 RX ORDER — SODIUM CHLORIDE 0.9 % (FLUSH) 0.9 %
10 SYRINGE (ML) INJECTION AS NEEDED
Status: DISCONTINUED | OUTPATIENT
Start: 2025-05-28 | End: 2025-05-28 | Stop reason: HOSPADM

## 2025-05-28 RX ORDER — MAGNESIUM HYDROXIDE 1200 MG/15ML
LIQUID ORAL AS NEEDED
Status: DISCONTINUED | OUTPATIENT
Start: 2025-05-28 | End: 2025-05-28 | Stop reason: HOSPADM

## 2025-05-28 RX ORDER — FENTANYL CITRATE 50 UG/ML
INJECTION, SOLUTION INTRAMUSCULAR; INTRAVENOUS AS NEEDED
Status: DISCONTINUED | OUTPATIENT
Start: 2025-05-28 | End: 2025-05-28 | Stop reason: SURG

## 2025-05-28 RX ORDER — SODIUM CHLORIDE, SODIUM LACTATE, POTASSIUM CHLORIDE, CALCIUM CHLORIDE 600; 310; 30; 20 MG/100ML; MG/100ML; MG/100ML; MG/100ML
100 INJECTION, SOLUTION INTRAVENOUS ONCE AS NEEDED
Status: DISCONTINUED | OUTPATIENT
Start: 2025-05-28 | End: 2025-05-28 | Stop reason: HOSPADM

## 2025-05-28 RX ORDER — IOPAMIDOL 612 MG/ML
INJECTION, SOLUTION INTRAVASCULAR AS NEEDED
Status: DISCONTINUED | OUTPATIENT
Start: 2025-05-28 | End: 2025-05-28 | Stop reason: HOSPADM

## 2025-05-28 RX ORDER — GENTAMICIN SULFATE 80 MG/50ML
80 INJECTION, SOLUTION INTRAVENOUS ONCE
Status: COMPLETED | OUTPATIENT
Start: 2025-05-28 | End: 2025-05-28

## 2025-05-28 RX ORDER — PHENYLEPHRINE HCL IN 0.9% NACL 1 MG/10 ML
SYRINGE (ML) INTRAVENOUS AS NEEDED
Status: DISCONTINUED | OUTPATIENT
Start: 2025-05-28 | End: 2025-05-28 | Stop reason: SURG

## 2025-05-28 RX ORDER — LIDOCAINE HYDROCHLORIDE 20 MG/ML
INJECTION, SOLUTION EPIDURAL; INFILTRATION; INTRACAUDAL; PERINEURAL AS NEEDED
Status: DISCONTINUED | OUTPATIENT
Start: 2025-05-28 | End: 2025-05-28 | Stop reason: SURG

## 2025-05-28 RX ORDER — MIDAZOLAM HYDROCHLORIDE 1 MG/ML
0.5 INJECTION, SOLUTION INTRAMUSCULAR; INTRAVENOUS
Status: DISCONTINUED | OUTPATIENT
Start: 2025-05-28 | End: 2025-05-28 | Stop reason: HOSPADM

## 2025-05-28 RX ORDER — GLYCOPYRROLATE 0.2 MG/ML
INJECTION INTRAMUSCULAR; INTRAVENOUS AS NEEDED
Status: DISCONTINUED | OUTPATIENT
Start: 2025-05-28 | End: 2025-05-28 | Stop reason: SURG

## 2025-05-28 RX ORDER — IPRATROPIUM BROMIDE AND ALBUTEROL SULFATE 2.5; .5 MG/3ML; MG/3ML
3 SOLUTION RESPIRATORY (INHALATION) ONCE AS NEEDED
Status: DISCONTINUED | OUTPATIENT
Start: 2025-05-28 | End: 2025-05-28 | Stop reason: HOSPADM

## 2025-05-28 RX ORDER — OXYCODONE AND ACETAMINOPHEN 5; 325 MG/1; MG/1
1 TABLET ORAL ONCE AS NEEDED
Status: DISCONTINUED | OUTPATIENT
Start: 2025-05-28 | End: 2025-05-28 | Stop reason: HOSPADM

## 2025-05-28 RX ORDER — ONDANSETRON 2 MG/ML
4 INJECTION INTRAMUSCULAR; INTRAVENOUS AS NEEDED
Status: DISCONTINUED | OUTPATIENT
Start: 2025-05-28 | End: 2025-05-28 | Stop reason: HOSPADM

## 2025-05-28 RX ORDER — FENTANYL CITRATE 50 UG/ML
50 INJECTION, SOLUTION INTRAMUSCULAR; INTRAVENOUS
Status: DISCONTINUED | OUTPATIENT
Start: 2025-05-28 | End: 2025-05-28 | Stop reason: HOSPADM

## 2025-05-28 RX ADMIN — FENTANYL CITRATE 100 MCG: 50 INJECTION INTRAMUSCULAR; INTRAVENOUS at 13:32

## 2025-05-28 RX ADMIN — GENTAMICIN SULFATE 80 MG: 80 INJECTION, SOLUTION INTRAVENOUS at 13:28

## 2025-05-28 RX ADMIN — GLYCOPYRROLATE 0.2 MG: 0.2 INJECTION INTRAMUSCULAR; INTRAVENOUS at 13:40

## 2025-05-28 RX ADMIN — LIDOCAINE HYDROCHLORIDE 60 MG: 20 INJECTION, SOLUTION EPIDURAL; INFILTRATION; INTRACAUDAL; PERINEURAL at 13:32

## 2025-05-28 RX ADMIN — Medication 100 MCG: at 13:40

## 2025-05-28 RX ADMIN — FAMOTIDINE 20 MG: 10 INJECTION, SOLUTION INTRAVENOUS at 13:28

## 2025-05-28 RX ADMIN — Medication 100 MCG: at 13:44

## 2025-05-28 RX ADMIN — PROPOFOL 100 MG: 10 INJECTION, EMULSION INTRAVENOUS at 13:32

## 2025-05-28 RX ADMIN — ONDANSETRON 4 MG: 2 INJECTION INTRAMUSCULAR; INTRAVENOUS at 13:35

## 2025-05-28 RX ADMIN — SODIUM CHLORIDE, POTASSIUM CHLORIDE, SODIUM LACTATE AND CALCIUM CHLORIDE: 600; 310; 30; 20 INJECTION, SOLUTION INTRAVENOUS at 13:28

## 2025-05-28 RX ADMIN — Medication 100 MCG: at 13:42

## 2025-05-28 NOTE — ANESTHESIA PROCEDURE NOTES
Airway  Reason: elective    Date/Time: 5/28/2025 1:32 PM  Airway not difficult    General Information and Staff    Patient location during procedure: OR  CRNA/CAA: Monica Gallo CRNA    Indications and Patient Condition  Indications for airway management: airway protection    Preoxygenated: yes    Mask difficulty assessment: 0 - not attempted    Final Airway Details    Final airway type: supraglottic airway      Successful airway: classic  Size: 4   Number of attempts at approach: 1  Assessment: lips, teeth, and gum same as pre-op    Additional Comments  LMA placed with no trauma noted. Patient tolerated well. Good seal. Secured.

## 2025-05-28 NOTE — ANESTHESIA POSTPROCEDURE EVALUATION
Patient: Ivis Vela    Procedure Summary       Date: 05/28/25 Room / Location:  [facility] OR 06 /  [facility]    Anesthesia Start: 1328 Anesthesia Stop: 1353    Procedure: URETEROSCOPY LASER LITHOTRIPSY WITH BASKET STONE RETRIEVAL WITH LEFT RETROGRADE PYELOGRAM (Left) Diagnosis:       Left ureteral stone      (Left ureteral stone [N20.1])    Surgeons: Sherwin Webb MD Provider: Kiko Haddad MD    Anesthesia Type: general ASA Status: 3            Anesthesia Type: general    Vitals  Vitals Value Taken Time   /73 05/28/25 14:20   Temp 97.5 °F (36.4 °C) 05/28/25 13:55   Pulse 79 05/28/25 14:23   Resp 14 05/28/25 14:15   SpO2 93 % 05/28/25 14:23   Vitals shown include unfiled device data.        Post Anesthesia Care and Evaluation    Patient location during evaluation: PACU  Patient participation: complete - patient participated  Level of consciousness: awake  Pain score: 0  Pain management: satisfactory to patient    Airway patency: patent  Anesthetic complications: No anesthetic complications  PONV Status: none  Cardiovascular status: hemodynamically stable  Respiratory status: nasal cannula  Hydration status: acceptable

## 2025-05-28 NOTE — ANESTHESIA PREPROCEDURE EVALUATION
Anesthesia Evaluation     Patient summary reviewed and Nursing notes reviewed   no history of anesthetic complications:   NPO Solid Status: > 8 hours  NPO Liquid Status: > 8 hours           Airway   Mallampati: II  TM distance: >3 FB  Neck ROM: full  No difficulty expected  Dental - normal exam     Pulmonary - normal exam    breath sounds clear to auscultation  (+) a smoker Former, asthma,  Cardiovascular - normal exam  Exercise tolerance: good (4-7 METS)    ECG reviewed  PT is on anticoagulation therapy  Rhythm: regular  Rate: normal    (+) hypertension, valvular problems/murmurs (mild TR), dysrhythmias Atrial Fib, angina, hyperlipidemia      Neuro/Psych  (+) dizziness/light headedness  GI/Hepatic/Renal/Endo    (+) obesity, GERD    Musculoskeletal (-) negative ROS    Abdominal   (+) obese    Abdomen: soft.   Substance History - negative use     OB/GYN negative ob/gyn ROS         Other   blood dyscrasia anemia,     ROS/Med Hx Other: Echo 10/19/2022:  ·  Left ventricular ejection fraction appears to be 51 - 55%.  ·  Left ventricular wall thickness is consistent with mild to moderate septal asymmetric hypertrophy.  ·  Left ventricular diastolic function is consistent with (grade I) impaired relaxation.  ·  Estimated right ventricular systolic pressure from tricuspid regurgitation is normal (<35 mmHg).                  Anesthesia Plan    ASA 3     general     intravenous induction     Anesthetic plan, risks, benefits, and alternatives have been provided, discussed and informed consent has been obtained with: patient.  Pre-procedure education provided  Use of blood products discussed with  Consented to blood products.    Plan discussed with CRNA.      CODE STATUS:

## 2025-05-28 NOTE — OP NOTE
Ivis Soria Dane  5/28/2025    Pre-op Diagnosis:   Left ureteral stone [N20.1]    Post-op Diagnosis:     Post-Op Diagnosis Codes:     * Left ureteral stone [N20.1]    C9-year-old white female with a left distal ureteral calculus now for ureteroscopy following an informed consent including the risk of anesthesia, bleeding, infection brought the procedure suite prepped and draped in the low dorsolithotomy I anesthetized the urethra with 10 cc of 2% viscous Xylocaine jelly I used the Rios 4.5 Sami ureteroscope with an obviously normal-appearing distal left ureter advanced into the identified a calcium oxalate monohydrate and dihydrate stone which I have used the laser to break up in multiple pieces and extracted several pieces for pathologic confirmation the rest were heavily fragmented retrograde showed no extravasation no problems were encountered she was awake and alert return to recovery  Procedure(s):  URETEROSCOPY LASER LITHOTRIPSY WITH BASKET STONE RETRIEVAL WITH LEFT RETROGRADE PYELOGRAM    Surgeon(s):  Sherwin Webb MD    Anesthesia: see anesthesia record    Staff:   Circulator: Stacy Gardner RN  Scrub Person: Maricruz Llanos; Jazzy Cerda    Estimated Blood Loss: none  Urine Voided: * No values recorded between 5/28/2025  1:29 PM and 5/28/2025  1:53 PM *    Specimens:                ID Type Source Tests Collected by Time   1 :  Calculus Ureter, Left STONE ANALYSIS Sherwin Webb MD 5/28/2025 1344         Drains: None    Findings: Calcium oxalate stone    Blood: N/A    Complications: None    Grafts and Implants: None    Sherwin Webb MD     Date: 5/28/2025  Time: 13:53 EDT

## 2025-06-04 ENCOUNTER — TRANSCRIBE ORDERS (OUTPATIENT)
Dept: ADMINISTRATIVE | Facility: HOSPITAL | Age: 70
End: 2025-06-04
Payer: MEDICARE

## 2025-06-04 DIAGNOSIS — Z12.31 VISIT FOR SCREENING MAMMOGRAM: Primary | ICD-10-CM

## 2025-06-04 LAB
CALCIUM OXALATE DIHYDRATE MFR STONE IR: 20 %
COLOR STONE: NORMAL
COM MFR STONE: 75 %
HYDROXYAPATITE: 5 %
SIZE STONE: NORMAL MM
SPEC SOURCE SUBJ: NORMAL
WT STONE: 14 MG

## 2025-06-24 ENCOUNTER — OFFICE VISIT (OUTPATIENT)
Dept: CARDIOLOGY | Facility: CLINIC | Age: 70
End: 2025-06-24
Payer: MEDICARE

## 2025-06-24 VITALS
SYSTOLIC BLOOD PRESSURE: 119 MMHG | DIASTOLIC BLOOD PRESSURE: 70 MMHG | WEIGHT: 169.4 LBS | OXYGEN SATURATION: 95 % | HEIGHT: 65 IN | HEART RATE: 83 BPM | BODY MASS INDEX: 28.22 KG/M2 | RESPIRATION RATE: 16 BRPM

## 2025-06-24 DIAGNOSIS — I25.10 ASCVD (ARTERIOSCLEROTIC CARDIOVASCULAR DISEASE): ICD-10-CM

## 2025-06-24 DIAGNOSIS — I10 ESSENTIAL HYPERTENSION: ICD-10-CM

## 2025-06-24 DIAGNOSIS — I48.0 PAROXYSMAL ATRIAL FIBRILLATION: Primary | ICD-10-CM

## 2025-06-24 DIAGNOSIS — E78.5 DYSLIPIDEMIA: ICD-10-CM

## 2025-06-24 PROCEDURE — 3074F SYST BP LT 130 MM HG: CPT | Performed by: NURSE PRACTITIONER

## 2025-06-24 PROCEDURE — 99214 OFFICE O/P EST MOD 30 MIN: CPT | Performed by: NURSE PRACTITIONER

## 2025-06-24 PROCEDURE — 1160F RVW MEDS BY RX/DR IN RCRD: CPT | Performed by: NURSE PRACTITIONER

## 2025-06-24 PROCEDURE — 3078F DIAST BP <80 MM HG: CPT | Performed by: NURSE PRACTITIONER

## 2025-06-24 PROCEDURE — 1159F MED LIST DOCD IN RCRD: CPT | Performed by: NURSE PRACTITIONER

## 2025-06-24 NOTE — PROGRESS NOTES
Lawrence Faulkner DO  Ivis Vela  1955 06/24/2025    Patient Active Problem List   Diagnosis    Hypertension    Asthma    Osteoporosis    Left arm pain    Closed nondisplaced fracture of head of radius with routine healing    Palpitations    Dizziness and giddiness    Unstable angina    Paroxysmal atrial fibrillation    PVC's (premature ventricular contractions)    Premature atrial contractions    VT (ventricular tachycardia)    Anemia    Long term current use of antiarrhythmic medical therapy    Abnormal nuclear stress test    Status post coronary angiogram    Left ureteral stone    Ureteral calculus       Dear Lawrence Faulkner DO:    Subjective     Chief Complaint   Patient presents with    Follow-up     6 MONTH FOLLOW UP       Med Management     VERBAL.   NO CHANGES.         History of Present Illness:    Ivis Vela is a 69 y.o. female with a past medical history of paroxysmal atrial fibrillation on Eliquis, PVCs, PACs, and iron deficiency anemia. She presents today for cardiology follow-up.  She reports she has been doing very well from a cardiac standpoint.  Denies any chest pain, shortness of breath, or palpitations.  She is tolerating her medications well including Eliquis with no bleeding issues.  Her blood pressure has been well-controlled.          No Known Allergies:      Current Outpatient Medications:     amitriptyline (ELAVIL) 25 MG tablet, TAKE 1 TO 2 TABLETS BY MOUTH EVERY DAY AT BEDTIME AS NEEDED, Disp: , Rfl:     apixaban (ELIQUIS) 5 MG tablet tablet, Take 1 tablet by mouth 2 (Two) Times a Day., Disp: 60 tablet, Rfl: 5    atorvastatin (LIPITOR) 80 MG tablet, TAKE 1 TABLET DAILY, Disp: 90 tablet, Rfl: 3    flecainide (TAMBOCOR) 100 MG tablet, Take 1 tablet by mouth 2 (Two) Times a Day., Disp: 60 tablet, Rfl: 5    HYDROcodone-acetaminophen (NORCO)  MG per tablet, Take 1 tablet by mouth Every 6 (Six) Hours As Needed for Moderate Pain (Pain)., Disp: 16 tablet, Rfl: 0     HYDROcodone-acetaminophen (NORCO) 5-325 MG per tablet, Take 1 tablet by mouth Every 8 (Eight) Hours As Needed for Severe Pain., Disp: 14 tablet, Rfl: 0    isosorbide mononitrate (IMDUR) 60 MG 24 hr tablet, Take 1 tablet by mouth Daily., Disp: 30 tablet, Rfl: 5    losartan-hydrochlorothiazide (HYZAAR) 100-12.5 MG per tablet, Take 0.5 tablets by mouth Daily., Disp: , Rfl:     meloxicam (MOBIC) 15 MG tablet, Take 1 tablet by mouth Daily., Disp: , Rfl:     metoprolol tartrate (LOPRESSOR) 25 MG tablet, Take 1 tablet by mouth 2 (Two) Times a Day., Disp: , Rfl:     nitroglycerin (NITROSTAT) 0.4 MG SL tablet, 1 under the tongue as needed for angina, may repeat q5mins for up three doses, Disp: 25 tablet, Rfl: 2    ondansetron (ZOFRAN) 4 MG tablet, Take 1 tablet by mouth Every 8 (Eight) Hours As Needed for Nausea or Vomiting., Disp: 20 tablet, Rfl: 0    pantoprazole (PROTONIX) 40 MG EC tablet, Take 1 tablet by mouth Daily., Disp: , Rfl:     potassium chloride 10 MEQ CR tablet, Take 1 tablet by mouth Daily., Disp: 5 tablet, Rfl: 0    sulfamethoxazole-trimethoprim (BACTRIM DS,SEPTRA DS) 800-160 MG per tablet, Take 1 tablet by mouth 2 (Two) Times a Day., Disp: 20 tablet, Rfl: 0    tamsulosin (FLOMAX) 0.4 MG capsule 24 hr capsule, Take 1 capsule by mouth Daily., Disp: 30 capsule, Rfl: 0      The following portions of the patient's history were reviewed and updated as appropriate: allergies, current medications, past family history, past medical history, past social history, past surgical history and problem list.    Social History     Tobacco Use    Smoking status: Former     Current packs/day: 0.00     Types: Electronic Cigarette, Cigarettes     Quit date:      Years since quittin.4     Passive exposure: Past    Smokeless tobacco: Never   Vaping Use    Vaping status: Every Day    Substances: Nicotine   Substance Use Topics    Alcohol use: No    Drug use: No       Review of Systems   Constitutional: Negative for  "decreased appetite and malaise/fatigue.   Cardiovascular:  Negative for chest pain, dyspnea on exertion and palpitations.   Respiratory:  Negative for cough and shortness of breath.        Objective   Vitals:    06/24/25 1257   BP: 119/70   BP Location: Left arm   Patient Position: Sitting   Cuff Size: Adult   Pulse: 83   Resp: 16   SpO2: 95%   Weight: 76.8 kg (169 lb 6.4 oz)   Height: 165.1 cm (65\")     Body mass index is 28.19 kg/m².        Vitals reviewed.   Constitutional:       Appearance: Healthy appearance. Well-developed and not in distress.   HENT:      Head: Normocephalic and atraumatic.   Neck:      Vascular: No carotid bruit or JVD.   Pulmonary:      Effort: Pulmonary effort is normal.      Breath sounds: Normal breath sounds. No wheezing. No rales.   Cardiovascular:      Normal rate. Regular rhythm.      Murmurs: There is no murmur.      . No S3 and S4 gallop.   Edema:     Peripheral edema absent.   Abdominal:      General: Bowel sounds are normal.      Palpations: Abdomen is soft.   Skin:     General: Skin is warm and dry.   Neurological:      Mental Status: Alert, oriented to person, place, and time and oriented to person, place and time.   Psychiatric:         Mood and Affect: Mood normal.         Behavior: Behavior normal.         Lab Results   Component Value Date     05/25/2025    K 3.4 (L) 05/25/2025     05/25/2025    CO2 26.1 05/25/2025    BUN 12 05/25/2025    CREATININE 0.86 05/25/2025    GLUCOSE 120 (H) 05/25/2025    CALCIUM 9.0 05/25/2025    AST 21 05/25/2025    ALT 14 05/25/2025    ALKPHOS 112 05/25/2025     Lab Results   Component Value Date    WBC 5.94 05/25/2025    HGB 10.0 (L) 05/25/2025    HCT 34.9 05/25/2025     05/25/2025     Lab Results   Component Value Date    TSH 1.390 03/21/2024    TRIG 96 10/18/2022    HDL 55 10/18/2022    LDL 85 10/18/2022          Results for orders placed during the hospital encounter of 10/18/22    Adult Transthoracic Echo Complete w/ " Color, Spectral and Contrast if necessary per protocol    Interpretation Summary    Left ventricular ejection fraction appears to be 51 - 55%.    Left ventricular wall thickness is consistent with mild to moderate septal asymmetric hypertrophy.    Left ventricular diastolic function is consistent with (grade I) impaired relaxation.    Estimated right ventricular systolic pressure from tricuspid regurgitation is normal (<35 mmHg).     Results for orders placed during the hospital encounter of 10/30/24    Stress Test With Myocardial Perfusion One Day    Interpretation Summary  Images from the original result were not included.      A pharmacological stress test was performed using regadenoson without low-level exercise.    Findings consistent with an indeterminate ECG stress test.    Myocardial perfusion imaging indicates a moderate-sized, moderate degree of ischemia located in the anterior wall and lateral wall.    Normal LV cavity size. Normal LV wall motion noted.    Left ventricular ejection fraction is normal (Calculated EF = 69%).    Impressions are consistent with an intermediate to high risk study.       Results for orders placed during the hospital encounter of 11/12/24    Cardiac Catheterization/Vascular Study    Conclusion  Conclusion:  1.  No significant angiographic evidence of coronary artery disease in a codominant circulation.        Recommendation:  1.  Optimize medical therapy.  2.  Routine post-cath care.        Procedures    Assessment & Plan    Diagnosis Plan   1. Paroxysmal atrial fibrillation        2. Essential hypertension        3. ASCVD (arteriosclerotic cardiovascular disease)        4. Dyslipidemia                 Recommendations:  Paroxysmal atrial fibrillation-maintaining sinus rhythm.  Continue Eliquis, flecainide, and metoprolol.  Essential hypertension-well-controlled.  Continue losartan/HCTZ.  ASCVD - nonobstructive. Continue atorvastatin, metoprolol, and losartan. No recent anginal  symptoms.  Dyslipidemia - continue statin therapy.  Follow up in 6 months or sooner if needed.      Return in about 6 months (around 12/24/2025) for Recheck.    As always, I appreciate very much the opportunity to participate in the cardiovascular care of your patients.      With Best Regards,    GAYLE Haskins

## 2025-06-25 DIAGNOSIS — N20.1 LEFT URETERAL STONE: Primary | ICD-10-CM

## 2025-08-06 ENCOUNTER — HOSPITAL ENCOUNTER (OUTPATIENT)
Facility: HOSPITAL | Age: 70
Discharge: HOME | End: 2025-08-06
Admitting: FAMILY MEDICINE
Payer: MEDICARE

## 2025-08-06 DIAGNOSIS — Z12.31 VISIT FOR SCREENING MAMMOGRAM: ICD-10-CM

## 2025-08-06 PROCEDURE — 77067 SCR MAMMO BI INCL CAD: CPT

## 2025-08-06 PROCEDURE — 77067 SCR MAMMO BI INCL CAD: CPT | Performed by: RADIOLOGY

## 2025-08-06 PROCEDURE — 77063 BREAST TOMOSYNTHESIS BI: CPT | Performed by: RADIOLOGY

## 2025-08-06 PROCEDURE — 77063 BREAST TOMOSYNTHESIS BI: CPT

## (undated) DEVICE — CATH F5 INF JR 4 100CM: Brand: INFINITI

## (undated) DEVICE — MODEL AT P65, P/N 701554-001KIT CONTENTS: HAND CONTROLLER, 3-WAY HIGH-PRESSURE STOPCOCK WITH ROTATING END AND PREMIUM HIGH-PRESSURE TUBING: Brand: ANGIOTOUCH® KIT

## (undated) DEVICE — ST EXT IV SMRTSTE 2VLV FIX M LL 6ML 41

## (undated) DEVICE — Device: Brand: DEFENDO AIR/WATER/SUCTION AND BIOPSY VALVE

## (undated) DEVICE — GW INQW FIX/CORE PTFE J/3MM .035 260CM

## (undated) DEVICE — FRCP BX RADJAW4 NDL 2.8 240CM LG OG BX40

## (undated) DEVICE — RADIAL RUNWAY TOP PADS: Brand: RADIAL RUNWAY TOP PADS

## (undated) DEVICE — LN FLTR ORL/NASL MICROSTREAM NONINTUB A/LNG

## (undated) DEVICE — GLIDESHEATH SLENDER STAINLESS STEEL KIT: Brand: GLIDESHEATH SLENDER

## (undated) DEVICE — FIBR LASR HOLMIUM SLIMLINE SIS EZ 365U

## (undated) DEVICE — RUNWAY RADL W/TOP PAD

## (undated) DEVICE — RADIFOCUS OPTITORQUE ANGIOGRAPHIC CATHETER: Brand: OPTITORQUE

## (undated) DEVICE — ST INF PRI SMRTSTE 20DRP 2VLV 24ML 117

## (undated) DEVICE — Device

## (undated) DEVICE — TR BAND RADIAL ARTERY COMPRESSION DEVICE: Brand: TR BAND

## (undated) DEVICE — DRAPE, RADIAL, STERILE: Brand: MEDLINE

## (undated) DEVICE — COR CYSTO: Brand: MEDLINE INDUSTRIES, INC.

## (undated) DEVICE — PAD, DEFIB, ADULT, RADIOTRANS, ZOLL: Brand: MEDLINE

## (undated) DEVICE — DRSNG SURESITE WNDW 4X4.5

## (undated) DEVICE — CATH F5 INF JL 3.5 100CM: Brand: INFINITI

## (undated) DEVICE — SNAR POLYP CAPTIFLX MICRO OVL 13MM 240CM

## (undated) DEVICE — THE BITE BLOCK MAXI, LATEX FREE STRAP IS USED TO PROTECT THE ENDOSCOPE INSERTION TUBE FROM BEING BITTEN BY THE PATIENT.

## (undated) DEVICE — A2000 MULTI-USE SYRINGE KIT, P/N 701277-003KIT CONTENTS: 100ML CONTRAST RESERVOIR AND TUBING WITH CONTRAST SPIKE AND CLAMP: Brand: A2000 MULTI-USE SYRINGE KIT

## (undated) DEVICE — CONN Y IRR DISP 1P/U

## (undated) DEVICE — NITINOL STONE RETRIEVAL BASKET: Brand: ZERO TIP

## (undated) DEVICE — ENDOGATOR AUXILIARY WATER JET CONNECTOR: Brand: ENDOGATOR

## (undated) DEVICE — PK CATH CARD 70

## (undated) DEVICE — ADULT DISPOSABLE SINGLE-PATIENT USE PULSE OXIMETER SENSOR: Brand: NONIN